# Patient Record
Sex: FEMALE | Race: WHITE | Employment: OTHER | URBAN - METROPOLITAN AREA
[De-identification: names, ages, dates, MRNs, and addresses within clinical notes are randomized per-mention and may not be internally consistent; named-entity substitution may affect disease eponyms.]

---

## 2017-11-01 ENCOUNTER — HOSPITAL ENCOUNTER (OUTPATIENT)
Age: 72
Discharge: HOME | End: 2017-11-01
Payer: MEDICARE

## 2017-11-01 ENCOUNTER — HOSPITAL ENCOUNTER (OUTPATIENT)
Dept: GENERAL RADIOLOGY | Age: 72
Discharge: HOME | End: 2017-11-01
Payer: MEDICARE
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
## 2017-11-01 DIAGNOSIS — R52 PAIN: ICD-10-CM

## 2017-11-01 PROCEDURE — 71020 XR CHEST STANDARD TWO VW: CPT

## 2018-01-15 ENCOUNTER — OFFICE VISIT (OUTPATIENT)
Dept: BEHAVIORAL/MENTAL HEALTH CLINIC | Age: 73
End: 2018-01-15
Payer: COMMERCIAL

## 2018-01-15 VITALS
HEIGHT: 61 IN | DIASTOLIC BLOOD PRESSURE: 86 MMHG | WEIGHT: 139 LBS | BODY MASS INDEX: 26.24 KG/M2 | HEART RATE: 102 BPM | SYSTOLIC BLOOD PRESSURE: 140 MMHG | RESPIRATION RATE: 16 BRPM

## 2018-01-15 DIAGNOSIS — F10.10 ALCOHOL USE DISORDER, MILD, ABUSE: ICD-10-CM

## 2018-01-15 DIAGNOSIS — F34.1 PERSISTENT DEPRESSIVE DISORDER WITH ANXIOUS DISTRESS, CURRENTLY SEVERE: Primary | ICD-10-CM

## 2018-01-15 DIAGNOSIS — R41.9 COGNITIVE COMPLAINTS: ICD-10-CM

## 2018-01-15 PROCEDURE — 1036F TOBACCO NON-USER: CPT | Performed by: PSYCHIATRY & NEUROLOGY

## 2018-01-15 PROCEDURE — 90792 PSYCH DIAG EVAL W/MED SRVCS: CPT | Performed by: PSYCHIATRY & NEUROLOGY

## 2018-01-15 RX ORDER — ALPRAZOLAM 0.5 MG/1
0.5 TABLET ORAL PRN
COMMUNITY
Start: 2013-01-21

## 2018-01-15 RX ORDER — AMLODIPINE BESYLATE 5 MG/1
5 TABLET ORAL DAILY
COMMUNITY
Start: 2015-06-01 | End: 2019-04-29 | Stop reason: SDUPTHER

## 2018-01-15 ASSESSMENT — ENCOUNTER SYMPTOMS
GASTROINTESTINAL NEGATIVE: 1
EYES NEGATIVE: 1
BACK PAIN: 1
RESPIRATORY NEGATIVE: 1
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
## 2018-05-07 ENCOUNTER — HOSPITAL (OUTPATIENT)
Dept: OTHER | Age: 73
End: 2018-05-07
Attending: EMERGENCY MEDICINE

## 2018-07-12 ENCOUNTER — HOSPITAL ENCOUNTER (OUTPATIENT)
Dept: WOMENS IMAGING | Age: 73
Discharge: HOME | End: 2018-07-14
Payer: MEDICARE

## 2018-07-12 ENCOUNTER — HOSPITAL ENCOUNTER (OUTPATIENT)
Dept: WOMENS IMAGING | Age: 73
End: 2018-07-12
Payer: MEDICARE

## 2018-07-12 DIAGNOSIS — M81.0 OSTEOPOROSIS, POST-MENOPAUSAL: ICD-10-CM

## 2018-07-12 DIAGNOSIS — Z12.39 SCREENING BREAST EXAMINATION: ICD-10-CM

## 2018-07-12 PROCEDURE — 77080 DXA BONE DENSITY AXIAL: CPT

## 2018-07-12 PROCEDURE — 77063 BREAST TOMOSYNTHESIS BI: CPT

## 2018-08-03 ENCOUNTER — APPOINTMENT (OUTPATIENT)
Dept: CT IMAGING | Age: 73
End: 2018-08-03
Payer: MEDICARE

## 2018-08-03 ENCOUNTER — HOSPITAL ENCOUNTER (EMERGENCY)
Age: 73
Discharge: HOME | End: 2018-08-03
Attending: EMERGENCY MEDICINE
Payer: MEDICARE

## 2018-08-03 VITALS
BODY MASS INDEX: 26.11 KG/M2 | WEIGHT: 133 LBS | DIASTOLIC BLOOD PRESSURE: 77 MMHG | HEART RATE: 87 BPM | HEIGHT: 60 IN | SYSTOLIC BLOOD PRESSURE: 161 MMHG | TEMPERATURE: 98.8 F | RESPIRATION RATE: 18 BRPM | OXYGEN SATURATION: 97 %

## 2018-08-03 DIAGNOSIS — N13.2 HYDRONEPHROSIS WITH URINARY OBSTRUCTION DUE TO URETERAL CALCULUS: Primary | ICD-10-CM

## 2018-08-03 DIAGNOSIS — N30.01 ACUTE CYSTITIS WITH HEMATURIA: ICD-10-CM

## 2018-08-03 LAB
-: ABNORMAL
ABSOLUTE EOS #: 0.1 K/UL (ref 0–0.4)
ABSOLUTE IMMATURE GRANULOCYTE: ABNORMAL K/UL (ref 0–0.3)
ABSOLUTE LYMPH #: 1.5 K/UL (ref 1–4.8)
ABSOLUTE MONO #: 0.9 K/UL (ref 0.1–1.2)
ALBUMIN SERPL-MCNC: 4.2 G/DL (ref 3.5–5.2)
ALBUMIN/GLOBULIN RATIO: 1.4 (ref 1–2.5)
ALP BLD-CCNC: 124 U/L (ref 35–104)
ALT SERPL-CCNC: 14 U/L (ref 5–33)
AMORPHOUS: ABNORMAL
ANION GAP SERPL CALCULATED.3IONS-SCNC: 13 MMOL/L (ref 9–17)
AST SERPL-CCNC: 15 U/L
BACTERIA: ABNORMAL
BASOPHILS # BLD: 1 % (ref 0–2)
BASOPHILS ABSOLUTE: 0 K/UL (ref 0–0.2)
BILIRUB SERPL-MCNC: 0.31 MG/DL (ref 0.3–1.2)
BILIRUBIN DIRECT: 0.09 MG/DL
BILIRUBIN URINE: NEGATIVE
BILIRUBIN, INDIRECT: 0.22 MG/DL (ref 0–1)
BUN BLDV-MCNC: 20 MG/DL (ref 8–23)
BUN/CREAT BLD: ABNORMAL (ref 9–20)
CALCIUM SERPL-MCNC: 10.2 MG/DL (ref 8.6–10.4)
CASTS UA: ABNORMAL /LPF
CHLORIDE BLD-SCNC: 99 MMOL/L (ref 98–107)
CO2: 25 MMOL/L (ref 20–31)
COLOR: YELLOW
COMMENT UA: ABNORMAL
CREAT SERPL-MCNC: 1.49 MG/DL (ref 0.5–0.9)
CRYSTALS, UA: ABNORMAL /HPF
DIFFERENTIAL TYPE: ABNORMAL
EOSINOPHILS RELATIVE PERCENT: 2 % (ref 1–4)
EPITHELIAL CELLS UA: ABNORMAL /HPF (ref 0–5)
GFR AFRICAN AMERICAN: 42 ML/MIN
GFR NON-AFRICAN AMERICAN: 34 ML/MIN
GFR SERPL CREATININE-BSD FRML MDRD: ABNORMAL ML/MIN/{1.73_M2}
GFR SERPL CREATININE-BSD FRML MDRD: ABNORMAL ML/MIN/{1.73_M2}
GLOBULIN: ABNORMAL G/DL (ref 1.5–3.8)
GLUCOSE BLD-MCNC: 108 MG/DL (ref 70–99)
GLUCOSE URINE: NEGATIVE
HCT VFR BLD CALC: 39.8 % (ref 36–46)
HEMOGLOBIN: 13.4 G/DL (ref 12–16)
IMMATURE GRANULOCYTES: ABNORMAL %
KETONES, URINE: ABNORMAL
LACTIC ACID: 1.1 MMOL/L (ref 0.5–2.2)
LEUKOCYTE ESTERASE, URINE: ABNORMAL
LIPASE: 36 U/L (ref 13–60)
LYMPHOCYTES # BLD: 22 % (ref 24–44)
MCH RBC QN AUTO: 31 PG (ref 26–34)
MCHC RBC AUTO-ENTMCNC: 33.7 G/DL (ref 31–37)
MCV RBC AUTO: 91.9 FL (ref 80–100)
MONOCYTES # BLD: 13 % (ref 2–11)
MUCUS: ABNORMAL
NITRITE, URINE: NEGATIVE
NRBC AUTOMATED: ABNORMAL PER 100 WBC
OTHER OBSERVATIONS UA: ABNORMAL
PDW BLD-RTO: 15.2 % (ref 12.5–15.4)
PH UA: 5.5 (ref 5–8)
PLATELET # BLD: 241 K/UL (ref 140–450)
PLATELET ESTIMATE: ABNORMAL
PMV BLD AUTO: 8 FL (ref 6–12)
POTASSIUM SERPL-SCNC: 3.6 MMOL/L (ref 3.7–5.3)
PROTEIN UA: NEGATIVE
RBC # BLD: 4.33 M/UL (ref 4–5.2)
RBC # BLD: ABNORMAL 10*6/UL
RBC UA: ABNORMAL /HPF (ref 0–2)
RENAL EPITHELIAL, UA: ABNORMAL /HPF
SEG NEUTROPHILS: 62 % (ref 36–66)
SEGMENTED NEUTROPHILS ABSOLUTE COUNT: 4.1 K/UL (ref 1.8–7.7)
SODIUM BLD-SCNC: 137 MMOL/L (ref 135–144)
SPECIFIC GRAVITY UA: 1.02 (ref 1–1.03)
TOTAL PROTEIN: 7.1 G/DL (ref 6.4–8.3)
TRICHOMONAS: ABNORMAL
TURBIDITY: ABNORMAL
URINE HGB: ABNORMAL
UROBILINOGEN, URINE: NORMAL
WBC # BLD: 6.6 K/UL (ref 3.5–11)
WBC # BLD: ABNORMAL 10*3/UL
WBC UA: ABNORMAL /HPF (ref 0–5)
YEAST: ABNORMAL

## 2018-08-03 PROCEDURE — 2580000003 HC RX 258: Performed by: EMERGENCY MEDICINE

## 2018-08-03 PROCEDURE — 36415 COLL VENOUS BLD VENIPUNCTURE: CPT

## 2018-08-03 PROCEDURE — 80048 BASIC METABOLIC PNL TOTAL CA: CPT

## 2018-08-03 PROCEDURE — 99284 EMERGENCY DEPT VISIT MOD MDM: CPT

## 2018-08-03 PROCEDURE — 85025 COMPLETE CBC W/AUTO DIFF WBC: CPT

## 2018-08-03 PROCEDURE — 83605 ASSAY OF LACTIC ACID: CPT

## 2018-08-03 PROCEDURE — 87086 URINE CULTURE/COLONY COUNT: CPT

## 2018-08-03 PROCEDURE — 83690 ASSAY OF LIPASE: CPT

## 2018-08-03 PROCEDURE — 6360000002 HC RX W HCPCS: Performed by: EMERGENCY MEDICINE

## 2018-08-03 PROCEDURE — 81001 URINALYSIS AUTO W/SCOPE: CPT

## 2018-08-03 PROCEDURE — 74176 CT ABD & PELVIS W/O CONTRAST: CPT

## 2018-08-03 PROCEDURE — 80076 HEPATIC FUNCTION PANEL: CPT

## 2018-08-03 RX ORDER — TAMSULOSIN HYDROCHLORIDE 0.4 MG/1
0.4 CAPSULE ORAL DAILY
Qty: 30 CAPSULE | Refills: 0 | Status: SHIPPED | OUTPATIENT
Start: 2018-08-03 | End: 2018-11-27

## 2018-08-03 RX ORDER — ONDANSETRON 4 MG/1
4 TABLET, FILM COATED ORAL ONCE
Status: COMPLETED | OUTPATIENT
Start: 2018-08-03 | End: 2018-08-03

## 2018-08-03 RX ORDER — 0.9 % SODIUM CHLORIDE 0.9 %
80 INTRAVENOUS SOLUTION INTRAVENOUS ONCE
Status: COMPLETED | OUTPATIENT
Start: 2018-08-03 | End: 2018-08-03

## 2018-08-03 RX ORDER — ONDANSETRON 4 MG/1
4 TABLET, ORALLY DISINTEGRATING ORAL EVERY 8 HOURS PRN
Qty: 12 TABLET | Refills: 0 | Status: SHIPPED | OUTPATIENT
Start: 2018-08-03 | End: 2019-08-20 | Stop reason: SDUPTHER

## 2018-08-03 RX ORDER — CEPHALEXIN 500 MG/1
500 CAPSULE ORAL 3 TIMES DAILY
Qty: 30 CAPSULE | Refills: 0 | Status: SHIPPED | OUTPATIENT
Start: 2018-08-03 | End: 2018-08-13

## 2018-08-03 RX ORDER — 0.9 % SODIUM CHLORIDE 0.9 %
80 INTRAVENOUS SOLUTION INTRAVENOUS ONCE
Status: DISCONTINUED | OUTPATIENT
Start: 2018-08-03 | End: 2018-08-04 | Stop reason: HOSPADM

## 2018-08-03 RX ORDER — SODIUM CHLORIDE 0.9 % (FLUSH) 0.9 %
10 SYRINGE (ML) INJECTION PRN
Status: DISCONTINUED | OUTPATIENT
Start: 2018-08-03 | End: 2018-08-04 | Stop reason: HOSPADM

## 2018-08-03 RX ORDER — HYDROCODONE BITARTRATE AND ACETAMINOPHEN 5; 325 MG/1; MG/1
1 TABLET ORAL EVERY 6 HOURS PRN
Qty: 12 TABLET | Refills: 0 | Status: SHIPPED | OUTPATIENT
Start: 2018-08-03 | End: 2018-08-06

## 2018-08-03 RX ADMIN — SODIUM CHLORIDE 80 ML: 9 INJECTION, SOLUTION INTRAVENOUS at 21:11

## 2018-08-03 RX ADMIN — ONDANSETRON HYDROCHLORIDE 4 MG: 4 TABLET, FILM COATED ORAL at 21:06

## 2018-08-03 ASSESSMENT — PAIN SCALES - GENERAL: PAINLEVEL_OUTOF10: 5

## 2018-08-03 ASSESSMENT — ENCOUNTER SYMPTOMS
ABDOMINAL PAIN: 1
EYE REDNESS: 0
VOMITING: 0
BACK PAIN: 0
COUGH: 0
NAUSEA: 1
SHORTNESS OF BREATH: 0
EYE DISCHARGE: 0
SORE THROAT: 0
DIARRHEA: 0

## 2018-08-03 ASSESSMENT — PAIN DESCRIPTION - LOCATION: LOCATION: ABDOMEN

## 2018-08-04 NOTE — ED PROVIDER NOTES
[facility]  [address] [facility] [address]  Phone: [phone]  Fax: [phone]      Pt Name: Mamadou Owens  MRN: [id number]  Silvano 1945  Date of evaluation: 8/3/2018      CHIEF COMPLAINT       Chief Complaint   Patient presents with    Abdominal Pain     onset a couple days ago and went to dr and was put on bactrim for uti. pain continues    Nausea       HISTORY OF PRESENT ILLNESS   (Location, Quality, Severity, Duration, Timing, Context, Modifying Factors, Associated Signs and Symptoms)     Mamadou Owens is a 68 y.o. female who presents to the ER for evaluation of abdominal pain. Patient states over the past 2-3 days she has had left lower quadrant abdominal pain. She states that at times the pain is sharp and spasm-like. A few times and felt as if the pain radiated to her back. Patient was seen and evaluated by her primary care physician and diagnosed with urinary tract infection. Patient was placed on Bactrim DS. She has been taking the medication over the past 2 days with no relief of her symptoms. She states that she continues to have pain. She states her pain was severe last night preventing her from sleeping. She has had associated nausea, but no vomiting. She denies diarrhea. No blood or mucus in the stool. She has had no fevers or chills. She does report urinary frequency. Patient does not recall a history of diverticulitis. She has not been taking any medication for discomfort. Her primary care physician did phone in a new antibiotic (Macrobid), but the patient did not  the medicine. She did solicit the advise of her pharmacist concerning her symptoms and it was recommended that she come to the ER for evaluation. Patient rates her acute pain at 5/10. Pain is worse with lying down. No change in pain associated with eating or drinking. Nursing Notes were reviewed.     REVIEW OF SYSTEMS     (2-9 systems for level 4, 10 or more for level 5)    Review of Systems   Constitutional: Negative for chills and fever. HENT: Negative for congestion, ear pain and sore throat. Eyes: Negative for discharge and redness. Respiratory: Negative for cough and shortness of breath. Cardiovascular: Negative for chest pain. Gastrointestinal: Positive for abdominal pain and nausea. Negative for diarrhea and vomiting. Genitourinary: Positive for dysuria and frequency. Musculoskeletal: Negative for back pain and neck pain. Skin: Negative for rash. Neurological: Negative for seizures and syncope. All other systems reviewed and are negative. PAST MEDICAL HISTORY    has a past medical history of Anxiety; Depression; Hyperlipidemia; Hypertension; and MS (multiple sclerosis) ([facility] [address] SURGICAL HISTORY      has a past surgical history that includes eye surgery; knee surgery; shoulder surgery; and hernia repair (05/2017). CURRENT MEDICATIONS       Discharge Medication List as of 8/3/2018 10:50 PM      CONTINUE these medications which have NOT CHANGED    Details   SIMVASTATIN PO Take by mouthHistorical Med      vitamin D (CHOLECALCIFEROL) 1000 UNIT TABS tablet Take 1,000 Units by mouth dailyHistorical Med      Multiple Vitamins-Minerals (OCUVITE ADULT FORMULA PO) Take by mouthHistorical Med      ALPRAZolam (XANAX) 0.5 MG tablet Take 0.5 mg by mouth 3 times daily prn. Historical Med      amLODIPine (NORVASC) 5 MG tablet Take 5 mg by mouth dailyHistorical Med      Multiple Vitamin (MULTI-DAY PO) Take by mouth dailyHistorical Med      CALCIUM PO one tab dailyHistorical Med      sertraline (ZOLOFT) 50 MG tablet Take 50 mg by mouth dailyHistorical Med           ALLERGIES     is allergic to ciprofloxacin; codeine; and lisinopril. FAMILY HISTORY     is adopted. family history is not on file. She was adopted. SOCIAL HISTORY      reports that she has never smoked. She has never used smokeless tobacco. She reports that she drinks alcohol. She reports that she does not use drugs. PHYSICAL EXAM     (7 for level 4, 8 or more for level 5)    ED Triage Vitals [08/03/18 2026]   BP Temp Temp Source Pulse Resp SpO2 Height Weight   (!) 161/77 98.8 °F (37.1 °C) Oral 87 -- 97 % 5' (1.524 m) 133 lb (60.3 kg)     Physical Exam   Constitutional: She appears well-developed and well-nourished. No distress. Nontoxic. HENT:   Head: Normocephalic and atraumatic. Eyes: No scleral icterus. Cardiovascular: Normal rate, regular rhythm and normal heart sounds. Pulmonary/Chest: Effort normal and breath sounds normal. No respiratory distress. Abdominal: Soft. Bowel sounds are normal. There is tenderness in the left lower quadrant. There is no rebound, no guarding and no CVA tenderness. Musculoskeletal:   Normal ambulation. Neurological: She is alert. Grossly intact. Skin: Skin is warm and dry. No rash noted. Psychiatric: She has a normal mood and affect. Her behavior is normal.   Vitals reviewed. DIAGNOSTIC RESULTS     RADIOLOGY:   Radiology images were visualized by myself. The Radiologist interpretations were reviewed and are as follows:     CT ABDOMEN PELVIS WO CONTRAST Additional Contrast? None (Final result)   Result time 08/03/18 22:33:48   Procedure changed from CT ABDOMEN PELVIS W IV CONTRAST Additional Contrast? None   Final result by Cecelia Sanchez MD (08/03/18 22:33:48)                Impression:    1. There is a 3 mm obstructing calculus at the left uterovesical junction  causing some thickening felt to be edema of the bladder base around the UVJ  as well as mild left hydronephrosis and hydroureter. 2. Bilateral nonobstructing sub 5 mm calculi left greater than right.   3. A 4.8 cm right renal cyst.            Narrative:    EXAMINATION:  CT OF THE ABDOMEN AND PELVIS WITHOUT CONTRAST 8/3/2018 10:19 pm    TECHNIQUE:  CT of the abdomen and pelvis was performed without the administration of  intravenous contrast. Multiplanar reformatted images are provided for review. Dose modulation, iterative reconstruction, and/or weight based adjustment of  the mA/kV was utilized to reduce the radiation dose to as low as reasonably  achievable. COMPARISON:  None. HISTORY:  ORDERING SYSTEM PROVIDED HISTORY: LLQ pain x 3 days  TECHNOLOGIST PROVIDED HISTORY:  Additional Contrast?->None  Ordering Physician Provided Reason for Exam: lower abdominal pain with  nausesa x 4 days. Acuity: Acute  Type of Exam: Subsequent/Follow-up  Additional signs and symptoms: States recent dx of UTI but reports bactrim is  not helping and now complains of bowel pain and gas. Relevant Medical/Surgical History: Hx of hiatal hernia, denies other  abdominal surgeries. FINDINGS:  Lower Chest: The visualized heart and lungs show no acute abnormalities. Organs: The liver, spleen, pancreas and adrenal glands show no significant  abnormality.  Gallbladder is also grossly normal showing no evidence for  gallstones. There is 4.8 cm simple cyst lower pole right kidney.  Punctate calculi right  kidney.  No right ureteric calculus. A few nonobstructing intrarenal left renal calculi are noted the largest 3 mm  in size.  There is mild hydronephrosis and hydroureter. Jeneal Chandu is a 3 mm  calculus at the left uterovesical junction. GI/Bowel: There is limited evaluation due to absence of oral contrast.    The stomach shows no focal lesions.  Small bowel loops normal in caliber  showing no focal abnormalities. Normal appendix. Evaluation of the colon shows no acute process. Pelvis: Uterus grossly normal.  Some thickening of the bladder base around  the left uterovesical junction where there is an obstructing calculus felt to  be due to edema from the obstruction.  .    Peritoneum/Retroperitoneum: No free intraperitoneal fluid or significant  lymphadenopathy.  Minor atherosclerotic disease.     Bones/Soft Tissues: No acute abnormality of the bones.  The superficial Granulocyte NOT REPORTED 0.00 - 0.30 k/uL    WBC Morphology NOT REPORTED     RBC Morphology NOT REPORTED     Platelet Estimate NOT REPORTED    Lipase   Result Value Ref Range    Lipase 36 13 - 60 U/L   Hepatic Function Panel   Result Value Ref Range    Alb 4.2 3.5 - 5.2 g/dL    Alkaline Phosphatase 124 (H) 35 - 104 U/L    ALT 14 5 - 33 U/L    AST 15 <32 U/L    Total Bilirubin 0.31 0.3 - 1.2 mg/dL    Bilirubin, Direct 0.09 <0.31 mg/dL    Bilirubin, Indirect 0.22 0.00 - 1.00 mg/dL    Total Protein 7.1 6.4 - 8.3 g/dL    Globulin NOT REPORTED 1.5 - 3.8 g/dL    Albumin/Globulin Ratio 1.4 1.0 - 2.5   Lactic Acid   Result Value Ref Range    Lactic Acid 1.1 0.5 - 2.2 mmol/L   Microscopic Urinalysis   Result Value Ref Range    -          WBC, UA 2 TO 5 0 - 5 /HPF    RBC, UA 2 TO 5 0 - 2 /HPF    Casts UA NOT REPORTED /LPF    Crystals UA FEW (A) NONE /HPF    Epithelial Cells UA 2 TO 5 0 - 5 /HPF    Renal Epithelial, Urine NOT REPORTED 0 /HPF    Bacteria, UA NOT REPORTED NONE    Mucus, UA NOT REPORTED NONE    Trichomonas, UA NOT REPORTED NONE    Amorphous, UA NOT REPORTED NONE    Other Observations UA Culture ordered based on defined criteria. (A) NREQ    Yeast, UA NOT REPORTED NONE     MDM:   Patient presents to the ER for evaluation of left lower quadrant abdominal pain. Patient states that she has had pain over the past 2-3 days. She states the pain was severe last night preventing her from sleeping. She states that it varies from a sharp to spasm-like pain. Patient was evaluated by the primary care physician and diagnosed with urinary tract infection. She was placed on Bactrim DS and has been taking the medication with no relief. A new prescription for Macrobid was called to the pharmacy, but the patient states that she did not pick it up. After speaking with her pharmacist she felt it would be best that she come to the ER for repeat evaluation. On exam, the patient appears well-hydrated.   She is not tachycardic or

## 2018-08-04 NOTE — ED PROVIDER NOTES
[facility]  [address] [address]  Phone: [phone]  Fax: [phone]      Attending Physician Attestation    I performed a history and physical examination of the patient and discussed management with the mid level provider. I reviewed the mid level provider's note and agree with the documented findings and plan of care. Any areas of disagreement are noted on the chart. I was personally present for the key portions of any procedures. I have documented in the chart those procedures where I was not present during the key portions. I have reviewed the emergency nurses triage note. I agree with the chief complaint, past medical history, past surgical history, allergies, medications, social and family history as documented unless otherwise noted below. Documentation of the HPI, Physical Exam and Medical Decision Making performed by mid level providers is based on my personal performance of the HPI, PE and MDM. For Physician Assistant/ Nurse Practitioner cases/documentation I have personally evaluated this patient and have completed at least one if not all key elements of the E/M (history, physical exam, and MDM). Additional findings are as noted. CHIEF COMPLAINT       Chief Complaint   Patient presents with    Abdominal Pain     onset a couple days ago and went to dr and was put on bactrim for uti. pain continues    Nausea         HISTORY OF PRESENT ILLNESS    Noe Cortés is a 68 y.o. female who presents Complaining of lower abdominal pain. She was recently diagnosed with urinary tract infection and despite being on antibiotics for 2 days, she still has severe pain. Pain on skills are tends 5 does not radiate anywhere. No nausea vomiting. No fevers or chills. She's never had pain or problems like this in the past.       PAST MEDICAL HISTORY    has a past medical history of Anxiety; Depression; Hyperlipidemia;  Hypertension; and MS (multiple sclerosis) Hematocrit 39.8 36 - 46 %    MCV 91.9 80 - 100 fL    MCH 31.0 26 - 34 pg    MCHC 33.7 31 - 37 g/dL    RDW 15.2 12.5 - 15.4 %    Platelets 981 425 - 936 k/uL    MPV 8.0 6.0 - 12.0 fL    NRBC Automated NOT REPORTED per 100 WBC    Differential Type NOT REPORTED     Seg Neutrophils 62 36 - 66 %    Lymphocytes 22 (L) 24 - 44 %    Monocytes 13 (H) 2 - 11 %    Eosinophils % 2 1 - 4 %    Basophils 1 0 - 2 %    Immature Granulocytes NOT REPORTED 0 %    Segs Absolute 4.10 1.8 - 7.7 k/uL    Absolute Lymph # 1.50 1.0 - 4.8 k/uL    Absolute Mono # 0.90 0.1 - 1.2 k/uL    Absolute Eos # 0.10 0.0 - 0.4 k/uL    Basophils # 0.00 0.0 - 0.2 k/uL    Absolute Immature Granulocyte NOT REPORTED 0.00 - 0.30 k/uL    WBC Morphology NOT REPORTED     RBC Morphology NOT REPORTED     Platelet Estimate NOT REPORTED    Lipase   Result Value Ref Range    Lipase 36 13 - 60 U/L   Hepatic Function Panel   Result Value Ref Range    Alb 4.2 3.5 - 5.2 g/dL    Alkaline Phosphatase 124 (H) 35 - 104 U/L    ALT 14 5 - 33 U/L    AST 15 <32 U/L    Total Bilirubin 0.31 0.3 - 1.2 mg/dL    Bilirubin, Direct 0.09 <0.31 mg/dL    Bilirubin, Indirect 0.22 0.00 - 1.00 mg/dL    Total Protein 7.1 6.4 - 8.3 g/dL    Globulin NOT REPORTED 1.5 - 3.8 g/dL    Albumin/Globulin Ratio 1.4 1.0 - 2.5   Lactic Acid   Result Value Ref Range    Lactic Acid 1.1 0.5 - 2.2 mmol/L   Microscopic Urinalysis   Result Value Ref Range    -          WBC, UA 2 TO 5 0 - 5 /HPF    RBC, UA 2 TO 5 0 - 2 /HPF    Casts UA NOT REPORTED /LPF    Crystals UA FEW (A) NONE /HPF    Epithelial Cells UA 2 TO 5 0 - 5 /HPF    Renal Epithelial, Urine NOT REPORTED 0 /HPF    Bacteria, UA NOT REPORTED NONE    Mucus, UA NOT REPORTED NONE    Trichomonas, UA NOT REPORTED NONE    Amorphous, UA NOT REPORTED NONE    Other Observations UA Culture ordered based on defined criteria.  (A) NREQ    Yeast, UA NOT REPORTED NONE           EMERGENCY DEPARTMENT COURSE:   Vitals:    Vitals:    08/03/18 2026   BP: (!) 161/77   Pulse: 87   Temp: 98.8 °F (37.1 °C)   TempSrc: Oral   SpO2: 97%   Weight: 60.3 kg (133 lb)   Height: 5' (1.524 m)     -------------------------  BP: (!) 161/77, Temp: 98.8 °F (37.1 °C), Pulse: 87,        PERTINENT ATTENDING PHYSICIAN COMMENTS:    Workup demonstrates ureterolithiasis with, urinary tract infection. She reports that she's not tolerating her current antibiotic therefore we'll change her to Keflex. I will write for [employer] as well as [employer] because [address] does make her nauseated. She has been given a narcotic warning. I will refer her on to urology. Impression:  1. Hydronephrosis with urinary obstruction due to ureteral calculus    2. Acute cystitis with hematuria       Condition: Good      (Please note that portions of this note were completed with a voice recognition program.  Efforts were made to edit the dictations but occasionally words are mis-transcribed.)    Goff MD, F.A.C.E.P.   Attending Emergency Medicine Physician        Renee Mcgowan MD  08/03/18 6085

## 2018-08-05 LAB
CULTURE: NORMAL
Lab: NORMAL
SPECIMEN DESCRIPTION: NORMAL
STATUS: NORMAL

## 2018-08-17 ENCOUNTER — HOSPITAL ENCOUNTER (OUTPATIENT)
Age: 73
Discharge: HOME | End: 2018-08-17
Payer: MEDICARE

## 2018-08-17 ENCOUNTER — HOSPITAL ENCOUNTER (OUTPATIENT)
Age: 73
Discharge: HOME | End: 2018-08-19
Payer: MEDICARE

## 2018-08-17 ENCOUNTER — HOSPITAL ENCOUNTER (OUTPATIENT)
Dept: GENERAL RADIOLOGY | Age: 73
Discharge: HOME | End: 2018-08-19
Payer: MEDICARE

## 2018-08-17 DIAGNOSIS — N20.2 CALCULUS OF KIDNEY AND URETER: ICD-10-CM

## 2018-08-17 LAB
-: ABNORMAL
AMORPHOUS: ABNORMAL
ANION GAP SERPL CALCULATED.3IONS-SCNC: 12 MMOL/L (ref 9–17)
BACTERIA: ABNORMAL
BILIRUBIN URINE: NEGATIVE
BUN BLDV-MCNC: 17 MG/DL (ref 8–23)
BUN/CREAT BLD: ABNORMAL (ref 9–20)
CALCIUM SERPL-MCNC: 9.5 MG/DL (ref 8.6–10.4)
CASTS UA: ABNORMAL /LPF (ref 0–2)
CHLORIDE BLD-SCNC: 103 MMOL/L (ref 98–107)
CO2: 25 MMOL/L (ref 20–31)
COLOR: YELLOW
COMMENT UA: ABNORMAL
CREAT SERPL-MCNC: 0.75 MG/DL (ref 0.5–0.9)
CRYSTALS, UA: ABNORMAL /HPF
EPITHELIAL CELLS UA: ABNORMAL /HPF (ref 0–5)
GFR AFRICAN AMERICAN: >60 ML/MIN
GFR NON-AFRICAN AMERICAN: >60 ML/MIN
GFR SERPL CREATININE-BSD FRML MDRD: ABNORMAL ML/MIN/{1.73_M2}
GFR SERPL CREATININE-BSD FRML MDRD: ABNORMAL ML/MIN/{1.73_M2}
GLUCOSE BLD-MCNC: 101 MG/DL (ref 70–99)
GLUCOSE URINE: NEGATIVE
KETONES, URINE: ABNORMAL
LEUKOCYTE ESTERASE, URINE: ABNORMAL
MUCUS: ABNORMAL
NITRITE, URINE: NEGATIVE
OTHER OBSERVATIONS UA: ABNORMAL
PH UA: 6 (ref 5–8)
POTASSIUM SERPL-SCNC: 3.5 MMOL/L (ref 3.7–5.3)
PROTEIN UA: NEGATIVE
RBC UA: ABNORMAL /HPF (ref 0–2)
RENAL EPITHELIAL, UA: ABNORMAL /HPF
SODIUM BLD-SCNC: 140 MMOL/L (ref 135–144)
SPECIFIC GRAVITY UA: 1.02 (ref 1–1.03)
TRICHOMONAS: ABNORMAL
TURBIDITY: ABNORMAL
URINE HGB: NEGATIVE
UROBILINOGEN, URINE: NORMAL
WBC UA: ABNORMAL /HPF (ref 0–5)
YEAST: ABNORMAL

## 2018-08-17 PROCEDURE — 74018 RADEX ABDOMEN 1 VIEW: CPT

## 2018-08-17 PROCEDURE — 81001 URINALYSIS AUTO W/SCOPE: CPT

## 2018-08-17 PROCEDURE — 87086 URINE CULTURE/COLONY COUNT: CPT

## 2018-08-17 PROCEDURE — 36415 COLL VENOUS BLD VENIPUNCTURE: CPT

## 2018-08-17 PROCEDURE — 82365 CALCULUS SPECTROSCOPY: CPT

## 2018-08-17 PROCEDURE — 80048 BASIC METABOLIC PNL TOTAL CA: CPT

## 2018-08-17 PROCEDURE — 86403 PARTICLE AGGLUT ANTBDY SCRN: CPT

## 2018-08-18 LAB
CULTURE: ABNORMAL
Lab: ABNORMAL
SPECIMEN DESCRIPTION: ABNORMAL
STATUS: ABNORMAL

## 2018-08-22 LAB
STONE COMPOSITION: NORMAL
STONE COMPOSITION: NORMAL
STONE DESCRIPTION: NORMAL
STONE DESCRIPTION: NORMAL
STONE MASS: 5 MG
STONE MASS: NORMAL
STONE NUMBER: 1
STONE NUMBER: 1
STONE SIZE: NORMAL
STONE SIZE: NORMAL MM

## 2018-08-23 ENCOUNTER — HOSPITAL ENCOUNTER (OUTPATIENT)
Dept: ULTRASOUND IMAGING | Age: 73
Discharge: HOME | End: 2018-08-25
Payer: MEDICARE

## 2018-08-23 DIAGNOSIS — N20.2 CALCULUS OF KIDNEY WITH CALCULUS OF URETER: ICD-10-CM

## 2018-08-23 PROCEDURE — 76770 US EXAM ABDO BACK WALL COMP: CPT

## 2018-09-20 ENCOUNTER — HOSPITAL ENCOUNTER (OUTPATIENT)
Dept: GENERAL RADIOLOGY | Age: 73
Discharge: HOME | End: 2018-09-22
Payer: MEDICARE

## 2018-09-20 ENCOUNTER — HOSPITAL ENCOUNTER (OUTPATIENT)
Age: 73
Discharge: HOME | End: 2018-09-22
Payer: MEDICARE

## 2018-09-20 DIAGNOSIS — M19.90 ARTHRITIS: ICD-10-CM

## 2018-09-20 PROCEDURE — 73110 X-RAY EXAM OF WRIST: CPT

## 2018-11-27 ENCOUNTER — HOSPITAL ENCOUNTER (EMERGENCY)
Age: 73
Discharge: HOME | End: 2018-11-27
Attending: EMERGENCY MEDICINE
Payer: MEDICARE

## 2018-11-27 ENCOUNTER — APPOINTMENT (OUTPATIENT)
Dept: GENERAL RADIOLOGY | Age: 73
End: 2018-11-27
Payer: MEDICARE

## 2018-11-27 VITALS
SYSTOLIC BLOOD PRESSURE: 150 MMHG | WEIGHT: 135 LBS | TEMPERATURE: 98.1 F | DIASTOLIC BLOOD PRESSURE: 75 MMHG | RESPIRATION RATE: 14 BRPM | BODY MASS INDEX: 27.21 KG/M2 | HEART RATE: 76 BPM | OXYGEN SATURATION: 97 % | HEIGHT: 59 IN

## 2018-11-27 DIAGNOSIS — S46.912A ELBOW STRAIN, LEFT, INITIAL ENCOUNTER: Primary | ICD-10-CM

## 2018-11-27 PROCEDURE — 99283 EMERGENCY DEPT VISIT LOW MDM: CPT

## 2018-11-27 PROCEDURE — 73080 X-RAY EXAM OF ELBOW: CPT

## 2018-11-27 ASSESSMENT — PAIN SCALES - GENERAL: PAINLEVEL_OUTOF10: 3

## 2018-11-27 ASSESSMENT — PAIN DESCRIPTION - ORIENTATION: ORIENTATION: LEFT

## 2018-11-27 ASSESSMENT — PAIN DESCRIPTION - LOCATION: LOCATION: ARM

## 2018-11-27 ASSESSMENT — PAIN DESCRIPTION - PAIN TYPE: TYPE: ACUTE PAIN

## 2018-11-27 NOTE — ED PROVIDER NOTES
She was adopted. SOCIAL HISTORY      reports that she has never smoked. She has never used smokeless tobacco. She reports that she drinks alcohol. She reports that she does not use drugs. PHYSICAL EXAM       ED Triage Vitals [11/27/18 1529]   BP Temp Temp Source Pulse Resp SpO2 Height Weight   (!) 150/75 98.1 °F (36.7 °C) Oral 76 14 97 % 4' 11\" (1.499 m) 135 lb (61.2 kg)     Constitutional: Alert, oriented x3, nontoxic, answering questions appropriately, acting properly for age, in no acute distress   HEENT: Extraocular muscles intact, mucous membranes moist   Neck: Trachea midline    Musculoskeletal:  Left upper extremity no pain at the shoulder or wrist.  Radial and ulnar arteries intact. Capillary refill less than 2 seconds. There is tenderness over the radial head without deformity no swelling no ecchymosis. Neurologic: Left upper extremity motor and sensory intact. Skin: Warm and dry         Physical Exam  DIFFERENTIAL DIAGNOSIS/ MDM:     Left elbow pain. Rule out Fracture    DIAGNOSTIC RESULTS     EKG: All [facility]'s [facility] who either signs or Co-signs this chart in the absence of a cardiologist.        Not indicated unless otherwise documented above    LABS:  No results found for this visit on 11/27/18. Not indicated unless otherwise documented above    RADIOLOGY:   I reviewedthe radiologist interpretations:    XR ELBOW LEFT (MIN 3 VIEWS)   Preliminary Result   No acute radiographic findings. Not indicated unless otherwise documented above    EMERGENCY DEPARTMENT COURSE:     The patient was given the following medications:  No orders of the defined types were placed in this encounter. Vitals:   -------------------------  BP (!) 150/75   Pulse 76   Temp 98.1 °F (36.7 °C) (Oral)   Resp 14   Ht 4' 11\" (1.499 m)   Wt 61.2 kg (135 lb)   SpO2 97%   BMI 27.27 kg/m²     4:05 PM x-ray unremarkable for fracture. Discharged to follow-up. Injury 6-8 weeks ago. Suspect strain. I have reviewed the disposition diagnosis with the patient and or their family/guardian. I have answered their questions and given discharge instructions. They voiced understanding of these instructions and did not have any furtherquestions or complaints.     CRITICAL CARE:    None    CONSULTS:    None    PROCEDURES:    None      OARRS Report if indicated             FINAL IMPRESSION      1. Elbow strain, left, initial encounter          DISPOSITION/PLAN   DISPOSITION Decision To Discharge 11/27/2018 04:05:57 PM        PATIENT REFERRED TO:  Ida Cooper MD  [address] [place] [address] [address] [place] [address]    Schedule an appointment as soon as possible for a visit in 1 week        DISCHARGE MEDICATIONS:  New Prescriptions    No medications on file       (Please note that portions of thisnote were completed with a voice recognition program.  Efforts were made to edit the dictations but occasionally words are mis-transcribed.)    Michel DO  Attending Emergency Physician        Becka Worley DO  11/27/18 1570

## 2019-03-12 ENCOUNTER — APPOINTMENT (OUTPATIENT)
Dept: GENERAL RADIOLOGY | Age: 74
End: 2019-03-12
Payer: MEDICARE

## 2019-03-12 ENCOUNTER — HOSPITAL ENCOUNTER (EMERGENCY)
Age: 74
Discharge: HOME | End: 2019-03-12
Attending: EMERGENCY MEDICINE
Payer: MEDICARE

## 2019-03-12 VITALS
DIASTOLIC BLOOD PRESSURE: 89 MMHG | OXYGEN SATURATION: 99 % | TEMPERATURE: 98.1 F | SYSTOLIC BLOOD PRESSURE: 155 MMHG | HEIGHT: 59 IN | WEIGHT: 148 LBS | HEART RATE: 77 BPM | BODY MASS INDEX: 29.84 KG/M2 | RESPIRATION RATE: 14 BRPM

## 2019-03-12 DIAGNOSIS — S39.012A STRAIN OF LUMBAR REGION, INITIAL ENCOUNTER: Primary | ICD-10-CM

## 2019-03-12 PROCEDURE — 72100 X-RAY EXAM L-S SPINE 2/3 VWS: CPT

## 2019-03-12 PROCEDURE — 99283 EMERGENCY DEPT VISIT LOW MDM: CPT

## 2019-03-12 ASSESSMENT — PAIN DESCRIPTION - PAIN TYPE: TYPE: ACUTE PAIN

## 2019-03-12 ASSESSMENT — PAIN SCALES - GENERAL: PAINLEVEL_OUTOF10: 4

## 2019-03-12 ASSESSMENT — PAIN DESCRIPTION - ORIENTATION: ORIENTATION: MID;LOWER

## 2019-03-12 ASSESSMENT — PAIN DESCRIPTION - LOCATION: LOCATION: BACK

## 2019-04-23 ENCOUNTER — OFFICE VISIT (OUTPATIENT)
Dept: PRIMARY CARE CLINIC | Age: 74
End: 2019-04-23
Payer: MEDICARE

## 2019-04-23 VITALS
OXYGEN SATURATION: 96 % | BODY MASS INDEX: 29.8 KG/M2 | SYSTOLIC BLOOD PRESSURE: 130 MMHG | WEIGHT: 147.8 LBS | DIASTOLIC BLOOD PRESSURE: 82 MMHG | HEART RATE: 92 BPM | HEIGHT: 59 IN

## 2019-04-23 DIAGNOSIS — R63.1 POLYDIPSIA: ICD-10-CM

## 2019-04-23 DIAGNOSIS — G62.9 NEUROPATHY: ICD-10-CM

## 2019-04-23 DIAGNOSIS — G35 MS (MULTIPLE SCLEROSIS) (HCC): ICD-10-CM

## 2019-04-23 DIAGNOSIS — E66.3 OVERWEIGHT (BMI 25.0-29.9): ICD-10-CM

## 2019-04-23 DIAGNOSIS — Z86.39 PERSONAL HISTORY OF OTHER ENDOCRINE, NUTRITIONAL AND METABOLIC DISEASE: ICD-10-CM

## 2019-04-23 DIAGNOSIS — M81.0 AGE RELATED OSTEOPOROSIS, UNSPECIFIED PATHOLOGICAL FRACTURE PRESENCE: ICD-10-CM

## 2019-04-23 DIAGNOSIS — E78.2 MIXED HYPERLIPIDEMIA: ICD-10-CM

## 2019-04-23 DIAGNOSIS — Z76.89 ENCOUNTER TO ESTABLISH CARE WITH NEW DOCTOR: Primary | ICD-10-CM

## 2019-04-23 DIAGNOSIS — I10 ESSENTIAL HYPERTENSION: ICD-10-CM

## 2019-04-23 PROCEDURE — 1090F PRES/ABSN URINE INCON ASSESS: CPT | Performed by: INTERNAL MEDICINE

## 2019-04-23 PROCEDURE — 1123F ACP DISCUSS/DSCN MKR DOCD: CPT | Performed by: INTERNAL MEDICINE

## 2019-04-23 PROCEDURE — 3017F COLORECTAL CA SCREEN DOC REV: CPT | Performed by: INTERNAL MEDICINE

## 2019-04-23 PROCEDURE — G0444 DEPRESSION SCREEN ANNUAL: HCPCS | Performed by: INTERNAL MEDICINE

## 2019-04-23 PROCEDURE — G8427 DOCREV CUR MEDS BY ELIG CLIN: HCPCS | Performed by: INTERNAL MEDICINE

## 2019-04-23 PROCEDURE — G8419 CALC BMI OUT NRM PARAM NOF/U: HCPCS | Performed by: INTERNAL MEDICINE

## 2019-04-23 PROCEDURE — G8399 PT W/DXA RESULTS DOCUMENT: HCPCS | Performed by: INTERNAL MEDICINE

## 2019-04-23 PROCEDURE — 99204 OFFICE O/P NEW MOD 45 MIN: CPT | Performed by: INTERNAL MEDICINE

## 2019-04-23 PROCEDURE — 4040F PNEUMOC VAC/ADMIN/RCVD: CPT | Performed by: INTERNAL MEDICINE

## 2019-04-23 PROCEDURE — 1036F TOBACCO NON-USER: CPT | Performed by: INTERNAL MEDICINE

## 2019-04-23 SDOH — HEALTH STABILITY: MENTAL HEALTH: HOW MANY STANDARD DRINKS CONTAINING ALCOHOL DO YOU HAVE ON A TYPICAL DAY?: 1 OR 2

## 2019-04-23 SDOH — HEALTH STABILITY: MENTAL HEALTH: HOW OFTEN DO YOU HAVE A DRINK CONTAINING ALCOHOL?: 2-3 TIMES A WEEK

## 2019-04-23 ASSESSMENT — PATIENT HEALTH QUESTIONNAIRE - PHQ9
7. TROUBLE CONCENTRATING ON THINGS, SUCH AS READING THE NEWSPAPER OR WATCHING TELEVISION: 1
5. POOR APPETITE OR OVEREATING: 0
2. FEELING DOWN, DEPRESSED OR HOPELESS: 3
SUM OF ALL RESPONSES TO PHQ QUESTIONS 1-9: 11
3. TROUBLE FALLING OR STAYING ASLEEP: 1
8. MOVING OR SPEAKING SO SLOWLY THAT OTHER PEOPLE COULD HAVE NOTICED. OR THE OPPOSITE, BEING SO FIGETY OR RESTLESS THAT YOU HAVE BEEN MOVING AROUND A LOT MORE THAN USUAL: 1
10. IF YOU CHECKED OFF ANY PROBLEMS, HOW DIFFICULT HAVE THESE PROBLEMS MADE IT FOR YOU TO DO YOUR WORK, TAKE CARE OF THINGS AT HOME, OR GET ALONG WITH OTHER PEOPLE: 1
6. FEELING BAD ABOUT YOURSELF - OR THAT YOU ARE A FAILURE OR HAVE LET YOURSELF OR YOUR FAMILY DOWN: 2
9. THOUGHTS THAT YOU WOULD BE BETTER OFF DEAD, OR OF HURTING YOURSELF: 0
4. FEELING TIRED OR HAVING LITTLE ENERGY: 2
SUM OF ALL RESPONSES TO PHQ9 QUESTIONS 1 & 2: 4
SUM OF ALL RESPONSES TO PHQ QUESTIONS 1-9: 11
1. LITTLE INTEREST OR PLEASURE IN DOING THINGS: 1

## 2019-04-24 ENCOUNTER — HOSPITAL ENCOUNTER (OUTPATIENT)
Age: 74
Discharge: HOME | End: 2019-04-24
Payer: MEDICARE

## 2019-04-24 DIAGNOSIS — Z86.39 PERSONAL HISTORY OF OTHER ENDOCRINE, NUTRITIONAL AND METABOLIC DISEASE: ICD-10-CM

## 2019-04-24 DIAGNOSIS — E78.2 MIXED HYPERLIPIDEMIA: ICD-10-CM

## 2019-04-24 DIAGNOSIS — R63.1 POLYDIPSIA: ICD-10-CM

## 2019-04-24 DIAGNOSIS — G62.9 NEUROPATHY: ICD-10-CM

## 2019-04-24 LAB
CHOLESTEROL/HDL RATIO: 2.6
CHOLESTEROL: 181 MG/DL
FOLATE: >20 NG/ML
HDLC SERPL-MCNC: 69 MG/DL
LDL CHOLESTEROL: 86 MG/DL (ref 0–130)
TRIGL SERPL-MCNC: 130 MG/DL
VITAMIN B-12: >2000 PG/ML (ref 232–1245)
VLDLC SERPL CALC-MCNC: NORMAL MG/DL (ref 1–30)

## 2019-04-24 PROCEDURE — 83036 HEMOGLOBIN GLYCOSYLATED A1C: CPT

## 2019-04-24 PROCEDURE — 82746 ASSAY OF FOLIC ACID SERUM: CPT

## 2019-04-24 PROCEDURE — 82607 VITAMIN B-12: CPT

## 2019-04-24 PROCEDURE — 80061 LIPID PANEL: CPT

## 2019-04-24 PROCEDURE — 36415 COLL VENOUS BLD VENIPUNCTURE: CPT

## 2019-04-25 ENCOUNTER — TELEPHONE (OUTPATIENT)
Dept: PRIMARY CARE CLINIC | Age: 74
End: 2019-04-25

## 2019-04-25 LAB
ESTIMATED AVERAGE GLUCOSE: 117 MG/DL
HBA1C MFR BLD: 5.7 % (ref 4–6)

## 2019-04-28 ASSESSMENT — ENCOUNTER SYMPTOMS
NAUSEA: 0
COUGH: 0
CONSTIPATION: 0
SINUS PAIN: 0
WHEEZING: 0
VOMITING: 0
DIARRHEA: 0
SHORTNESS OF BREATH: 0
ABDOMINAL PAIN: 0
BACK PAIN: 0
SINUS PRESSURE: 0
ABDOMINAL DISTENTION: 0

## 2019-04-29 ENCOUNTER — TELEPHONE (OUTPATIENT)
Dept: PRIMARY CARE CLINIC | Age: 74
End: 2019-04-29

## 2019-04-29 RX ORDER — AMLODIPINE BESYLATE 5 MG/1
5 TABLET ORAL DAILY
Qty: 30 TABLET | Refills: 5 | Status: SHIPPED | OUTPATIENT
Start: 2019-04-29

## 2019-04-29 NOTE — TELEPHONE ENCOUNTER
Medication: norvasc 5mg  Last visit: 04/23/19  Next visit: 6/24/2019  Last refill: filled by previous pcp  Pharmacy: [facility],  [facility]

## 2019-04-30 RX ORDER — NITROFURANTOIN 25; 75 MG/1; MG/1
100 CAPSULE ORAL 2 TIMES DAILY
Qty: 10 CAPSULE | Refills: 0 | Status: SHIPPED | OUTPATIENT
Start: 2019-04-30 | End: 2019-05-05

## 2019-05-14 ENCOUNTER — TELEPHONE (OUTPATIENT)
Dept: PRIMARY CARE CLINIC | Age: 74
End: 2019-05-14

## 2019-05-14 DIAGNOSIS — K21.9 GASTROESOPHAGEAL REFLUX DISEASE, ESOPHAGITIS PRESENCE NOT SPECIFIED: Primary | ICD-10-CM

## 2019-05-14 RX ORDER — PANTOPRAZOLE SODIUM 20 MG/1
20 TABLET, DELAYED RELEASE ORAL
Qty: 60 TABLET | Refills: 1 | Status: SHIPPED | OUTPATIENT
Start: 2019-05-14 | End: 2019-06-11

## 2019-05-14 NOTE — TELEPHONE ENCOUNTER
Patient wanted me to let you know that she takes a lot of calcium and prolia for her bones and is afraid to take the protonix and have it hurt her bones. Therefore she has decided to take tums and to change her diet. She wants to thank you.

## 2019-05-14 NOTE — TELEPHONE ENCOUNTER
Patient called stating she is in [facility] until end of month and she is feeling a pain between her breast, on the lower part of her sternum. She states she had a hiatal hernia surgery 3 yrs ago and hasnt had any issues with heartburn since then but states this feels a little different than heart burn. She is concerned she is getting an ulcer. She does mention that shes been drinking pineapple/abner juice which is not something she usually consumes. She is wondering if there are foods she should stay away from or foods she should eat in order to relieve this. Or if you could call something in. She isnt sure if this is related to food she is eating or the stress she is under. If you do call something in, she would like it to go to [facility] in [facility] which is listed in her chart.

## 2019-05-14 NOTE — TELEPHONE ENCOUNTER
Kindly let the patient know to start taking the Protonix twice a day in empty stomach and wait for half hour to eat or drink anything else to prevent ulcers and also to relieve discomfort. Advise the patient to avoid foods which cause her discomfort and also spicy and oily foods.

## 2019-06-04 ENCOUNTER — TELEPHONE (OUTPATIENT)
Dept: PRIMARY CARE CLINIC | Age: 74
End: 2019-06-04

## 2019-06-04 DIAGNOSIS — F41.9 ANXIETY DISORDER, UNSPECIFIED TYPE: ICD-10-CM

## 2019-06-04 DIAGNOSIS — G62.9 NEUROPATHY: Primary | ICD-10-CM

## 2019-06-11 ENCOUNTER — OFFICE VISIT (OUTPATIENT)
Dept: PRIMARY CARE CLINIC | Age: 74
End: 2019-06-11
Payer: MEDICARE

## 2019-06-11 VITALS
WEIGHT: 141.4 LBS | OXYGEN SATURATION: 98 % | SYSTOLIC BLOOD PRESSURE: 136 MMHG | HEART RATE: 69 BPM | BODY MASS INDEX: 28.56 KG/M2 | DIASTOLIC BLOOD PRESSURE: 72 MMHG | RESPIRATION RATE: 16 BRPM

## 2019-06-11 DIAGNOSIS — F41.9 ANXIETY AND DEPRESSION: ICD-10-CM

## 2019-06-11 DIAGNOSIS — R53.82 CHRONIC FATIGUE: ICD-10-CM

## 2019-06-11 DIAGNOSIS — I10 ESSENTIAL HYPERTENSION: Primary | ICD-10-CM

## 2019-06-11 DIAGNOSIS — F32.A ANXIETY AND DEPRESSION: ICD-10-CM

## 2019-06-11 LAB
T4 FREE: 0.78 NG/DL (ref 0.61–1.6)
TSH SERPL DL<=0.05 MIU/L-ACNC: 0.88 UIU/ML (ref 0.49–4.67)

## 2019-06-11 PROCEDURE — 1036F TOBACCO NON-USER: CPT | Performed by: INTERNAL MEDICINE

## 2019-06-11 PROCEDURE — 4040F PNEUMOC VAC/ADMIN/RCVD: CPT | Performed by: INTERNAL MEDICINE

## 2019-06-11 PROCEDURE — G8399 PT W/DXA RESULTS DOCUMENT: HCPCS | Performed by: INTERNAL MEDICINE

## 2019-06-11 PROCEDURE — 3017F COLORECTAL CA SCREEN DOC REV: CPT | Performed by: INTERNAL MEDICINE

## 2019-06-11 PROCEDURE — 99214 OFFICE O/P EST MOD 30 MIN: CPT | Performed by: INTERNAL MEDICINE

## 2019-06-11 PROCEDURE — 1090F PRES/ABSN URINE INCON ASSESS: CPT | Performed by: INTERNAL MEDICINE

## 2019-06-11 PROCEDURE — 1123F ACP DISCUSS/DSCN MKR DOCD: CPT | Performed by: INTERNAL MEDICINE

## 2019-06-11 PROCEDURE — G8427 DOCREV CUR MEDS BY ELIG CLIN: HCPCS | Performed by: INTERNAL MEDICINE

## 2019-06-11 PROCEDURE — G8419 CALC BMI OUT NRM PARAM NOF/U: HCPCS | Performed by: INTERNAL MEDICINE

## 2019-06-11 ASSESSMENT — ENCOUNTER SYMPTOMS
ABDOMINAL DISTENTION: 0
CONSTIPATION: 0
DIARRHEA: 0
NAUSEA: 0
SHORTNESS OF BREATH: 0
ABDOMINAL PAIN: 0
VOMITING: 0
SINUS PRESSURE: 0
COUGH: 0
WHEEZING: 0
BACK PAIN: 0
SINUS PAIN: 0

## 2019-06-11 NOTE — PROGRESS NOTES
Visit Information    Have you changed or started any medications since your last visit including any over-the-counter medicines, vitamins, or herbal medicines? no   Are you having any side effects from any of your medications? -  no  Have you stopped taking any of your medications? Is so, why? -  yes - pantoprazole    Have you seen any other physician or provider since your last visit? No  Have you had any other diagnostic tests since your last visit? No  Have you been seen in the emergency room and/or had an admission to a hospital since we last saw you? No  Have you had your routine dental cleaning in the past 6 months? yes -     Have you activated your [employer] account? If not, what are your barriers?  No     Patient Care Team:  Bruce Hernandez MD as PCP - General (Internal Medicine)  Bruce Hernandez MD as PCP - [facility]    Medical History Review  Past Medical, Family, and Social History reviewed and does not contribute to the patient presenting condition    Health Maintenance   Topic Date Due    Hepatitis C screen  1945    DTaP/Tdap/Td vaccine (1 - Tdap) 01/20/1964    Shingles Vaccine (1 of 2) 01/20/1995    Colon cancer screen colonoscopy  01/20/1995    Pneumococcal 65+ years Vaccine (1 of 2 - PCV13) 01/20/2010    Potassium monitoring  08/17/2019    Creatinine monitoring  08/17/2019    Flu vaccine (Season Ended) 09/01/2019    A1C test (Diabetic or Prediabetic)  04/24/2020    Breast cancer screen  07/12/2020    Lipid screen  04/24/2024    DEXA (modify frequency per FRAX score)  Completed

## 2019-06-11 NOTE — PROGRESS NOTES
[facility]  [address] [place] [address] Dr  [address]  [place] [address]  Dept: [phone]  Dept Fax: [phone]    London Xiong is a 76 y.o. female who presents today for her medical conditions/complaints as noted below. Chief Complaint   Patient presents with    Other     left sided pain, bp concerns, and depression       HPI:     This is a 79-year-old female who is here for regular follow-up. Her blood pressure is normal at 136/72 but she reports that her blood pressure was high at the dentist office. She is on 5 mg of Norvasc at this time. She has not had any headaches or any other complaints    She complains of left-sided abdominal discomfort on and off without any diarrhea. She complains of constipation as well. She reports that she is depressed and was recently started on nortriptyline by her psychiatric. No other complaints. She goes to therapist regularly.       Hemoglobin A1C (%)   Date Value   04/24/2019 5.7             ( goal A1C is < 7)   No results found for: LABMICR  LDL Cholesterol (mg/dL)   Date Value   04/24/2019 86       (goal LDL is <100)   AST (U/L)   Date Value   08/03/2018 15     ALT (U/L)   Date Value   08/03/2018 14     BUN (mg/dL)   Date Value   08/17/2018 17     BP Readings from Last 3 Encounters:   06/11/19 136/72   04/23/19 130/82   03/12/19 (!) 155/89          (goal 120/80)    Past Medical History:   Diagnosis Date    Anxiety     Depression     Hyperlipidemia     Hypertension     MS (multiple sclerosis) ([facility]     Osteoarthritis     Osteoporosis       Past Surgical History:   Procedure Laterality Date    EYE SURGERY      x3    HERNIA REPAIR  05/2017    ingial    KNEE SURGERY      menicus right 2007    SHOULDER SURGERY      right rotator cuff 2016       Family History   Adopted: Yes       Social History     Tobacco Use    Smoking status: Never Smoker    Smokeless tobacco: Never Used   Substance Use Topics    congestion, ear pain, hearing loss, nosebleeds, sinus pressure, sinus pain and sneezing. Eyes: Negative for visual disturbance. Respiratory: Negative for cough, shortness of breath and wheezing. Cardiovascular: Negative for chest pain and palpitations. Gastrointestinal: Negative for abdominal distention, abdominal pain, constipation, diarrhea, nausea and vomiting. Endocrine: Negative for cold intolerance, heat intolerance, polydipsia, polyphagia and polyuria. Genitourinary: Negative for difficulty urinating, menstrual problem, vaginal bleeding and vaginal discharge. Musculoskeletal: Negative for back pain and joint swelling. Skin: Negative for rash. Neurological: Negative for numbness and headaches. Psychiatric/Behavioral: Negative for sleep disturbance. The patient is not nervous/anxious. All other systems reviewed and are negative. Objective:     Physical Exam   Constitutional: She is oriented to person, place, and time. Vital signs are normal. She appears well-developed and well-nourished. She is active. No distress. HENT:   Head: Normocephalic and atraumatic. Right Ear: Hearing normal.   Left Ear: Hearing normal.   Mouth/Throat: Uvula is midline, oropharynx is clear and moist and mucous membranes are normal.   Eyes: Pupils are equal, round, and reactive to light. Conjunctivae are normal. No scleral icterus. Neck: Normal range of motion, full passive range of motion without pain and phonation normal. Neck supple. No JVD present. No thyroid mass and no thyromegaly present. Cardiovascular: Normal rate, regular rhythm, normal heart sounds and intact distal pulses. Exam reveals no decreased pulses. No murmur heard. Pulses:       Carotid pulses are 2+ on the right side, and 2+ on the left side. Radial pulses are 2+ on the right side, and 2+ on the left side. Pulmonary/Chest: Effort normal and breath sounds normal. No accessory muscle usage. No apnea.  No respiratory this chart was generated using voice recognition Dragon dictation software. Although every effort was made to ensure the accuracy of this automatedtranscription, some errors in transcription may have occurred.

## 2019-07-15 ENCOUNTER — OFFICE VISIT (OUTPATIENT)
Dept: PRIMARY CARE CLINIC | Age: 74
End: 2019-07-15
Payer: MEDICARE

## 2019-07-15 VITALS
OXYGEN SATURATION: 91 % | HEIGHT: 59 IN | WEIGHT: 140 LBS | HEART RATE: 79 BPM | BODY MASS INDEX: 28.22 KG/M2 | DIASTOLIC BLOOD PRESSURE: 76 MMHG | SYSTOLIC BLOOD PRESSURE: 116 MMHG

## 2019-07-15 DIAGNOSIS — G35 MS (MULTIPLE SCLEROSIS) (HCC): ICD-10-CM

## 2019-07-15 DIAGNOSIS — F32.0 CURRENT MILD EPISODE OF MAJOR DEPRESSIVE DISORDER, UNSPECIFIED WHETHER RECURRENT (HCC): ICD-10-CM

## 2019-07-15 DIAGNOSIS — I10 ESSENTIAL HYPERTENSION: Primary | ICD-10-CM

## 2019-07-15 DIAGNOSIS — E66.3 OVERWEIGHT (BMI 25.0-29.9): ICD-10-CM

## 2019-07-15 PROCEDURE — 1036F TOBACCO NON-USER: CPT | Performed by: INTERNAL MEDICINE

## 2019-07-15 PROCEDURE — G8427 DOCREV CUR MEDS BY ELIG CLIN: HCPCS | Performed by: INTERNAL MEDICINE

## 2019-07-15 PROCEDURE — G8419 CALC BMI OUT NRM PARAM NOF/U: HCPCS | Performed by: INTERNAL MEDICINE

## 2019-07-15 PROCEDURE — 99214 OFFICE O/P EST MOD 30 MIN: CPT | Performed by: INTERNAL MEDICINE

## 2019-07-15 PROCEDURE — 1123F ACP DISCUSS/DSCN MKR DOCD: CPT | Performed by: INTERNAL MEDICINE

## 2019-07-15 PROCEDURE — 3017F COLORECTAL CA SCREEN DOC REV: CPT | Performed by: INTERNAL MEDICINE

## 2019-07-15 PROCEDURE — 4040F PNEUMOC VAC/ADMIN/RCVD: CPT | Performed by: INTERNAL MEDICINE

## 2019-07-15 PROCEDURE — G8399 PT W/DXA RESULTS DOCUMENT: HCPCS | Performed by: INTERNAL MEDICINE

## 2019-07-15 PROCEDURE — 1090F PRES/ABSN URINE INCON ASSESS: CPT | Performed by: INTERNAL MEDICINE

## 2019-07-15 RX ORDER — BIMATOPROST 3 UG/ML
1 SOLUTION TOPICAL NIGHTLY
Refills: 10 | COMMUNITY
Start: 2019-06-14 | End: 2019-08-20 | Stop reason: ALTCHOICE

## 2019-07-15 RX ORDER — NORTRIPTYLINE HYDROCHLORIDE 10 MG/1
10 CAPSULE ORAL DAILY
Refills: 2 | COMMUNITY
Start: 2019-06-06 | End: 2019-11-12 | Stop reason: CLARIF

## 2019-07-16 PROBLEM — F32.0 CURRENT MILD EPISODE OF MAJOR DEPRESSIVE DISORDER (HCC): Status: ACTIVE | Noted: 2019-07-16

## 2019-07-16 NOTE — PROGRESS NOTES
Physical Exam  /76   Pulse 79   Ht 4' 11\" (1.499 m)   Wt 140 lb (63.5 kg)   SpO2 91%   BMI 28.28 kg/m²     Assessment:          1. Essential hypertension  Well-controlled on Norvasc 5 mg daily    2. MS (multiple sclerosis) (HCC)  stable    3. Overweight (BMI 25.0-29. 9)  Exercise    4. Current mild episode of major depressive disorder, unspecified whether recurrent ([facility]  started on nortriptyline            Diagnosis Orders   1. Essential hypertension     2. MS (multiple sclerosis) ([facility]     3. Overweight (BMI 25.0-29.9)     4. Current mild episode of major depressive disorder, unspecified whether recurrent (HCC)       Quality & Risk Score Accuracy    Visit Dx:  G35 - MS (multiple sclerosis) ([facility]  Assessment and plan:  Stable based upon symptoms and exam. Continue current treatment plan and follow up at least yearly. Last edited 07/15/19 08:43 EDT by Vance Acosta MD           Plan:      No follow-ups on file. No orders of the defined types were placed in this encounter. No orders of the defined types were placed in this encounter. Patient given educational materials - see patient instructions. Discussed use, benefit, and side effects of prescribedmedications. All patient questions answered. Pt voiced understanding. Reviewed health maintenance. Instructed to continue current medications, diet and exercise. Patient agreed with treatment plan. Follow up as directed. Of the given duration appointment visit, Dr. Vance Acosta MD  spent at least 50% of the face-to-face time in counseling, explanation of diagnosis, planning of further management, and answering all questions. Electronically signed by Vance Acosta MD on 7/16/2019 at 10:29 AM      Please note that this chart was generated using voice recognition Dragon dictation software.   Although every effort was made to ensure the accuracy of this automatedtranscription, some errors in transcription may have

## 2019-07-18 ENCOUNTER — TELEPHONE (OUTPATIENT)
Dept: PRIMARY CARE CLINIC | Age: 74
End: 2019-07-18

## 2019-08-12 ENCOUNTER — TELEPHONE (OUTPATIENT)
Dept: PRIMARY CARE CLINIC | Age: 74
End: 2019-08-12

## 2019-08-20 ENCOUNTER — OFFICE VISIT (OUTPATIENT)
Dept: PRIMARY CARE CLINIC | Age: 74
End: 2019-08-20
Payer: MEDICARE

## 2019-08-20 VITALS
WEIGHT: 139.4 LBS | RESPIRATION RATE: 16 BRPM | BODY MASS INDEX: 28.16 KG/M2 | DIASTOLIC BLOOD PRESSURE: 70 MMHG | HEART RATE: 50 BPM | SYSTOLIC BLOOD PRESSURE: 122 MMHG | OXYGEN SATURATION: 93 %

## 2019-08-20 DIAGNOSIS — R59.9 REACTIVE LYMPHADENOPATHY: ICD-10-CM

## 2019-08-20 DIAGNOSIS — F41.1 GENERALIZED ANXIETY DISORDER: Primary | ICD-10-CM

## 2019-08-20 PROCEDURE — 1090F PRES/ABSN URINE INCON ASSESS: CPT | Performed by: INTERNAL MEDICINE

## 2019-08-20 PROCEDURE — 99213 OFFICE O/P EST LOW 20 MIN: CPT | Performed by: INTERNAL MEDICINE

## 2019-08-20 PROCEDURE — 1123F ACP DISCUSS/DSCN MKR DOCD: CPT | Performed by: INTERNAL MEDICINE

## 2019-08-20 PROCEDURE — 1036F TOBACCO NON-USER: CPT | Performed by: INTERNAL MEDICINE

## 2019-08-20 PROCEDURE — 3017F COLORECTAL CA SCREEN DOC REV: CPT | Performed by: INTERNAL MEDICINE

## 2019-08-20 PROCEDURE — G8427 DOCREV CUR MEDS BY ELIG CLIN: HCPCS | Performed by: INTERNAL MEDICINE

## 2019-08-20 PROCEDURE — G8419 CALC BMI OUT NRM PARAM NOF/U: HCPCS | Performed by: INTERNAL MEDICINE

## 2019-08-20 PROCEDURE — 4040F PNEUMOC VAC/ADMIN/RCVD: CPT | Performed by: INTERNAL MEDICINE

## 2019-08-20 PROCEDURE — G8399 PT W/DXA RESULTS DOCUMENT: HCPCS | Performed by: INTERNAL MEDICINE

## 2019-08-20 RX ORDER — ONDANSETRON 4 MG/1
4 TABLET, ORALLY DISINTEGRATING ORAL EVERY 8 HOURS PRN
Qty: 12 TABLET | Refills: 0 | Status: SHIPPED | OUTPATIENT
Start: 2019-08-20 | End: 2020-07-06 | Stop reason: SDUPTHER

## 2019-08-21 ENCOUNTER — TELEPHONE (OUTPATIENT)
Dept: SURGERY | Age: 74
End: 2019-08-21

## 2019-08-21 NOTE — TELEPHONE ENCOUNTER
Attempted to reach patient to schedule screening colonoscopy visit with provider. Advised to contact the office to schedule at [phone]. Attempt 1.

## 2019-08-22 ASSESSMENT — ENCOUNTER SYMPTOMS
SINUS PAIN: 0
WHEEZING: 0
BACK PAIN: 0
SINUS PRESSURE: 0
COUGH: 0
ABDOMINAL DISTENTION: 0
ABDOMINAL PAIN: 0
CONSTIPATION: 0
NAUSEA: 0
VOMITING: 0
SHORTNESS OF BREATH: 0
DIARRHEA: 0

## 2019-08-23 NOTE — PROGRESS NOTES
note that this chart was generated using voice recognition Dragon dictation software. Although every effort was made to ensure the accuracy of this automatedtranscription, some errors in transcription may have occurred.

## 2019-09-25 ENCOUNTER — OFFICE VISIT (OUTPATIENT)
Dept: PRIMARY CARE CLINIC | Age: 74
End: 2019-09-25
Payer: MEDICARE

## 2019-09-25 VITALS
RESPIRATION RATE: 16 BRPM | BODY MASS INDEX: 27.95 KG/M2 | HEART RATE: 101 BPM | SYSTOLIC BLOOD PRESSURE: 118 MMHG | OXYGEN SATURATION: 97 % | WEIGHT: 138.4 LBS | DIASTOLIC BLOOD PRESSURE: 80 MMHG

## 2019-09-25 DIAGNOSIS — I10 ESSENTIAL HYPERTENSION: ICD-10-CM

## 2019-09-25 DIAGNOSIS — B02.9 HERPES ZOSTER WITHOUT COMPLICATION: Primary | ICD-10-CM

## 2019-09-25 DIAGNOSIS — Z23 NEED FOR INFLUENZA VACCINATION: ICD-10-CM

## 2019-09-25 DIAGNOSIS — F03.90 DEMENTIA WITHOUT BEHAVIORAL DISTURBANCE, UNSPECIFIED DEMENTIA TYPE: ICD-10-CM

## 2019-09-25 PROCEDURE — 4040F PNEUMOC VAC/ADMIN/RCVD: CPT | Performed by: INTERNAL MEDICINE

## 2019-09-25 PROCEDURE — 1036F TOBACCO NON-USER: CPT | Performed by: INTERNAL MEDICINE

## 2019-09-25 PROCEDURE — 3017F COLORECTAL CA SCREEN DOC REV: CPT | Performed by: INTERNAL MEDICINE

## 2019-09-25 PROCEDURE — 99214 OFFICE O/P EST MOD 30 MIN: CPT | Performed by: INTERNAL MEDICINE

## 2019-09-25 PROCEDURE — 1090F PRES/ABSN URINE INCON ASSESS: CPT | Performed by: INTERNAL MEDICINE

## 2019-09-25 PROCEDURE — G8419 CALC BMI OUT NRM PARAM NOF/U: HCPCS | Performed by: INTERNAL MEDICINE

## 2019-09-25 PROCEDURE — 1123F ACP DISCUSS/DSCN MKR DOCD: CPT | Performed by: INTERNAL MEDICINE

## 2019-09-25 PROCEDURE — G8427 DOCREV CUR MEDS BY ELIG CLIN: HCPCS | Performed by: INTERNAL MEDICINE

## 2019-09-25 PROCEDURE — G8399 PT W/DXA RESULTS DOCUMENT: HCPCS | Performed by: INTERNAL MEDICINE

## 2019-09-25 ASSESSMENT — ENCOUNTER SYMPTOMS
SINUS PAIN: 0
BACK PAIN: 0
NAUSEA: 0
CONSTIPATION: 0
VOMITING: 0
ABDOMINAL PAIN: 0
SHORTNESS OF BREATH: 0
ABDOMINAL DISTENTION: 0
DIARRHEA: 0
WHEEZING: 0
COUGH: 0
SINUS PRESSURE: 0

## 2019-09-25 NOTE — PROGRESS NOTES
[facility]  [facility] [address]  [address] [address]  Dept: [phone]  Dept Fax: [phone]    Irene Shannon is a 76 y.o. female who presents today for her medical conditions/complaints as noted below. Chief Complaint   Patient presents with    Other     pt c/o neck pain, fatigue, rash on back of neck. pt concerned about shingles       HPI:     This is a 29-year-old female who is here for complaints of rash on her back of her neck which is been going on for past 3 to 4 days and she has been having fatigue and neck pain as well. Next line she is having some tingling and numbness in that area also. No fever or chills. Her blood pressure today is at 118/80 and pulse at 101. She has had shingles in the past and she feels the same way as she felt during that time. She is also complaining of dementia and would like to see a neurologist for the same. It has been getting worse since few years and would like to get checked out. No other complaints or concerns at this time. Hemoglobin A1C (%)   Date Value   04/24/2019 5.7             ( goal A1C is < 7)   No results found for: LABMICR  LDL Cholesterol (mg/dL)   Date Value   04/24/2019 86       (goal LDL is <100)   AST (U/L)   Date Value   08/03/2018 15     ALT (U/L)   Date Value   08/03/2018 14     BUN (mg/dL)   Date Value   08/17/2018 17     BP Readings from Last 3 Encounters:   09/25/19 118/80   08/20/19 122/70   07/15/19 116/76          (goal 120/80)    Past Medical History:   Diagnosis Date    Anxiety     Depression     Hyperlipidemia     Hypertension     MS (multiple sclerosis) ([facility]     Osteoarthritis     Osteoporosis       Past Surgical History:   Procedure Laterality Date    EYE SURGERY      x3    HERNIA REPAIR  05/2017    ingial    KNEE SURGERY      menicus right 2007    SHOULDER SURGERY      right rotator cuff 2016       Family History   Adopted:  Yes Lipid screen  04/24/2020    Breast cancer screen  07/12/2020    DEXA (modify frequency per FRAX score)  Completed       Subjective:      Review of Systems   Constitutional: Negative for activity change, appetite change, chills, fatigue and fever. HENT: Negative for congestion, ear pain, hearing loss, nosebleeds, sinus pressure, sinus pain and sneezing. Eyes: Negative for visual disturbance. Respiratory: Negative for cough, shortness of breath and wheezing. Cardiovascular: Negative for chest pain and palpitations. Gastrointestinal: Negative for abdominal distention, abdominal pain, constipation, diarrhea, nausea and vomiting. Endocrine: Negative for cold intolerance, heat intolerance, polydipsia, polyphagia and polyuria. Genitourinary: Negative for difficulty urinating, menstrual problem, vaginal bleeding and vaginal discharge. Musculoskeletal: Negative for back pain and joint swelling. Skin: Positive for rash (Right-sided back of her neck). Neurological: Negative for numbness and headaches. Psychiatric/Behavioral: Negative for sleep disturbance. The patient is not nervous/anxious. Objective:     Physical Exam   Constitutional: She is oriented to person, place, and time. Vital signs are normal. She appears well-developed and well-nourished. She is active. No distress. HENT:   Head: Normocephalic and atraumatic. Right Ear: Hearing normal.   Left Ear: Hearing normal.   Mouth/Throat: Uvula is midline, oropharynx is clear and moist and mucous membranes are normal.   Eyes: Pupils are equal, round, and reactive to light. Conjunctivae are normal. No scleral icterus. Neck: Normal range of motion, full passive range of motion without pain and phonation normal. Neck supple. No JVD present. No thyroid mass and no thyromegaly present. Cardiovascular: Normal rate, regular rhythm, normal heart sounds and intact distal pulses. Exam reveals no decreased pulses. No murmur heard.   Pulses: 12/25/2019) for Routine follow-up. Orders Placed This Encounter   Procedures    INFLUENZA, TRIV, INACTIVATED, SUBUNIT, ADJUVANTED, 65 YRS AND OLDER, IM, PREFILL SYR, 0.5ML (FLUAD TRIV)    Basic Metabolic Panel     Standing Status:   Future     Standing Expiration Date:   9/25/2020   Kelly Mcbride MD, Neurology, [facility]     Referral Priority:   Routine     Referral Type:   Eval and Treat     Referral Reason:   Specialty Services Required     Referred to Provider:   Alveda Halsted, MD     Requested Specialty:   Neurology     Number of Visits Requested:   1     No orders of the defined types were placed in this encounter. Patient given educational materials - see patient instructions. Discussed use, benefit, and side effects of prescribedmedications. All patient questions answered. Pt voiced understanding. Reviewed health maintenance. Instructed to continue current medications, diet and exercise. Patient agreed with treatment plan. Follow up as directed. I spent a total of 25 minutes face to face with this patient. Over 50% of that time was spent on counseling and care coordination. Please see assessment and plan for details. Electronically signed by Cat Mason MD on 9/25/2019 at 2:26 PM      Please note that this chart was generated using voice recognition Dragon dictation software. Although every effort was made to ensure the accuracy of this automatedtranscription, some errors in transcription may have occurred.

## 2019-10-01 LAB
ANION GAP SERPL CALCULATED.3IONS-SCNC: 9 MMOL/L (ref 4–12)
BUN BLDV-MCNC: 16 MG/DL (ref 5–27)
CALCIUM SERPL-MCNC: 10.5 MG/DL (ref 8.5–10.5)
CHLORIDE BLD-SCNC: 103 MMOL/L (ref 98–109)
CO2: 29 MMOL/L (ref 22–32)
CREAT SERPL-MCNC: 1 MG/DL (ref 0.4–1)
EGFR AFRICAN AMERICAN: >60 ML/MIN/1.73SQ.M
EGFR IF NONAFRICAN AMERICAN: 54 ML/MIN/1.73SQ.M
GLUCOSE: 129 MG/DL (ref 65–99)
POTASSIUM SERPL-SCNC: 4.3 MMOL/L (ref 3.5–5)
SODIUM BLD-SCNC: 141 MMOL/L (ref 134–146)

## 2019-10-02 ENCOUNTER — HOSPITAL ENCOUNTER (OUTPATIENT)
Age: 74
Setting detail: SPECIMEN
Discharge: HOME | End: 2019-10-02
Payer: MEDICARE

## 2019-10-02 ENCOUNTER — TELEPHONE (OUTPATIENT)
Dept: PRIMARY CARE CLINIC | Age: 74
End: 2019-10-02

## 2019-10-02 ENCOUNTER — OFFICE VISIT (OUTPATIENT)
Dept: FAMILY MEDICINE CLINIC | Age: 74
End: 2019-10-02
Payer: MEDICARE

## 2019-10-02 VITALS
WEIGHT: 140.2 LBS | HEIGHT: 59 IN | TEMPERATURE: 98.7 F | DIASTOLIC BLOOD PRESSURE: 78 MMHG | BODY MASS INDEX: 28.26 KG/M2 | SYSTOLIC BLOOD PRESSURE: 136 MMHG | RESPIRATION RATE: 18 BRPM | HEART RATE: 100 BPM

## 2019-10-02 DIAGNOSIS — R35.0 URINARY FREQUENCY: ICD-10-CM

## 2019-10-02 DIAGNOSIS — N39.0 URINARY TRACT INFECTION WITHOUT HEMATURIA, SITE UNSPECIFIED: Primary | ICD-10-CM

## 2019-10-02 DIAGNOSIS — R10.33 PERIUMBILICAL ABDOMINAL PAIN: ICD-10-CM

## 2019-10-02 LAB
APPEARANCE FLUID: CLEAR
BILIRUBIN, POC: NORMAL
BLOOD URINE, POC: NORMAL
CLARITY, POC: CLEAR
COLOR, POC: YELLOW
GLUCOSE URINE, POC: NORMAL
KETONES, POC: NORMAL
LEUKOCYTE EST, POC: NORMAL
NITRITE, POC: NORMAL
PH, POC: 6
PROTEIN, POC: NORMAL
SPECIFIC GRAVITY, POC: 1.01
UROBILINOGEN, POC: 0.2

## 2019-10-02 PROCEDURE — G8484 FLU IMMUNIZE NO ADMIN: HCPCS | Performed by: PHYSICIAN ASSISTANT

## 2019-10-02 PROCEDURE — 4040F PNEUMOC VAC/ADMIN/RCVD: CPT | Performed by: PHYSICIAN ASSISTANT

## 2019-10-02 PROCEDURE — 1036F TOBACCO NON-USER: CPT | Performed by: PHYSICIAN ASSISTANT

## 2019-10-02 PROCEDURE — 1123F ACP DISCUSS/DSCN MKR DOCD: CPT | Performed by: PHYSICIAN ASSISTANT

## 2019-10-02 PROCEDURE — 99213 OFFICE O/P EST LOW 20 MIN: CPT | Performed by: PHYSICIAN ASSISTANT

## 2019-10-02 PROCEDURE — 81002 URINALYSIS NONAUTO W/O SCOPE: CPT | Performed by: PHYSICIAN ASSISTANT

## 2019-10-02 PROCEDURE — G8427 DOCREV CUR MEDS BY ELIG CLIN: HCPCS | Performed by: PHYSICIAN ASSISTANT

## 2019-10-02 PROCEDURE — G8399 PT W/DXA RESULTS DOCUMENT: HCPCS | Performed by: PHYSICIAN ASSISTANT

## 2019-10-02 PROCEDURE — G8419 CALC BMI OUT NRM PARAM NOF/U: HCPCS | Performed by: PHYSICIAN ASSISTANT

## 2019-10-02 PROCEDURE — 3017F COLORECTAL CA SCREEN DOC REV: CPT | Performed by: PHYSICIAN ASSISTANT

## 2019-10-02 PROCEDURE — 1090F PRES/ABSN URINE INCON ASSESS: CPT | Performed by: PHYSICIAN ASSISTANT

## 2019-10-02 RX ORDER — PHENAZOPYRIDINE HYDROCHLORIDE 200 MG/1
200 TABLET, FILM COATED ORAL 3 TIMES DAILY PRN
Qty: 6 TABLET | Refills: 0 | Status: SHIPPED | OUTPATIENT
Start: 2019-10-02 | End: 2019-10-04

## 2019-10-02 RX ORDER — NITROFURANTOIN 25; 75 MG/1; MG/1
100 CAPSULE ORAL 2 TIMES DAILY
Qty: 14 CAPSULE | Refills: 0 | Status: SHIPPED | OUTPATIENT
Start: 2019-10-02 | End: 2019-10-09

## 2019-10-03 DIAGNOSIS — I10 ESSENTIAL HYPERTENSION: Primary | ICD-10-CM

## 2019-10-03 LAB
CULTURE: ABNORMAL
Lab: ABNORMAL
SPECIMEN DESCRIPTION: ABNORMAL

## 2019-10-08 ENCOUNTER — TELEPHONE (OUTPATIENT)
Dept: PRIMARY CARE CLINIC | Age: 74
End: 2019-10-08

## 2019-10-08 ASSESSMENT — ENCOUNTER SYMPTOMS
EYES NEGATIVE: 1
BLOOD IN STOOL: 0
CONSTIPATION: 0
CHEST TIGHTNESS: 0
COUGH: 0
ANAL BLEEDING: 0
DIARRHEA: 0
VOMITING: 0
SHORTNESS OF BREATH: 0
ABDOMINAL DISTENTION: 0
NAUSEA: 0
RECTAL PAIN: 0
WHEEZING: 0

## 2019-10-11 ENCOUNTER — TELEPHONE (OUTPATIENT)
Dept: PRIMARY CARE CLINIC | Age: 74
End: 2019-10-11

## 2019-10-16 ENCOUNTER — TELEPHONE (OUTPATIENT)
Dept: PRIMARY CARE CLINIC | Age: 74
End: 2019-10-16

## 2019-11-12 ENCOUNTER — OFFICE VISIT (OUTPATIENT)
Dept: NEUROLOGY | Age: 74
End: 2019-11-12
Payer: MEDICARE

## 2019-11-12 VITALS
WEIGHT: 141.2 LBS | DIASTOLIC BLOOD PRESSURE: 85 MMHG | BODY MASS INDEX: 28.47 KG/M2 | HEIGHT: 59 IN | SYSTOLIC BLOOD PRESSURE: 124 MMHG | HEART RATE: 79 BPM

## 2019-11-12 DIAGNOSIS — R41.3 MEMORY LOSS: Primary | ICD-10-CM

## 2019-11-12 DIAGNOSIS — R26.81 GAIT INSTABILITY: ICD-10-CM

## 2019-11-12 DIAGNOSIS — G35 MULTIPLE SCLEROSIS (HCC): ICD-10-CM

## 2019-11-12 PROCEDURE — G8417 CALC BMI ABV UP PARAM F/U: HCPCS | Performed by: PSYCHIATRY & NEUROLOGY

## 2019-11-12 PROCEDURE — G8427 DOCREV CUR MEDS BY ELIG CLIN: HCPCS | Performed by: PSYCHIATRY & NEUROLOGY

## 2019-11-12 PROCEDURE — 1090F PRES/ABSN URINE INCON ASSESS: CPT | Performed by: PSYCHIATRY & NEUROLOGY

## 2019-11-12 PROCEDURE — 4040F PNEUMOC VAC/ADMIN/RCVD: CPT | Performed by: PSYCHIATRY & NEUROLOGY

## 2019-11-12 PROCEDURE — 1123F ACP DISCUSS/DSCN MKR DOCD: CPT | Performed by: PSYCHIATRY & NEUROLOGY

## 2019-11-12 PROCEDURE — G8484 FLU IMMUNIZE NO ADMIN: HCPCS | Performed by: PSYCHIATRY & NEUROLOGY

## 2019-11-12 PROCEDURE — 3017F COLORECTAL CA SCREEN DOC REV: CPT | Performed by: PSYCHIATRY & NEUROLOGY

## 2019-11-12 PROCEDURE — 99204 OFFICE O/P NEW MOD 45 MIN: CPT | Performed by: PSYCHIATRY & NEUROLOGY

## 2019-11-12 PROCEDURE — 1036F TOBACCO NON-USER: CPT | Performed by: PSYCHIATRY & NEUROLOGY

## 2019-11-12 PROCEDURE — G8399 PT W/DXA RESULTS DOCUMENT: HCPCS | Performed by: PSYCHIATRY & NEUROLOGY

## 2019-11-12 RX ORDER — ATORVASTATIN CALCIUM 10 MG/1
10 TABLET, FILM COATED ORAL DAILY
COMMUNITY
End: 2021-03-26

## 2019-11-12 RX ORDER — CITALOPRAM 20 MG/1
20 TABLET ORAL DAILY
Qty: 30 TABLET | Refills: 3 | Status: SHIPPED | OUTPATIENT
Start: 2019-11-12 | End: 2020-06-04 | Stop reason: SDUPTHER

## 2019-11-12 ASSESSMENT — ENCOUNTER SYMPTOMS
RESPIRATORY NEGATIVE: 1
BACK PAIN: 1
GASTROINTESTINAL NEGATIVE: 1
EYES NEGATIVE: 1
ALLERGIC/IMMUNOLOGIC NEGATIVE: 1

## 2019-12-05 ENCOUNTER — HOSPITAL ENCOUNTER (OUTPATIENT)
Dept: MRI IMAGING | Age: 74
Discharge: HOME | End: 2019-12-07
Payer: MEDICARE

## 2019-12-05 ENCOUNTER — HOSPITAL ENCOUNTER (OUTPATIENT)
Age: 74
Discharge: HOME | End: 2019-12-05
Payer: MEDICARE

## 2019-12-05 DIAGNOSIS — I10 ESSENTIAL HYPERTENSION: ICD-10-CM

## 2019-12-05 DIAGNOSIS — R41.3 MEMORY LOSS: ICD-10-CM

## 2019-12-05 DIAGNOSIS — G35 MULTIPLE SCLEROSIS (HCC): ICD-10-CM

## 2019-12-05 LAB
ANION GAP SERPL CALCULATED.3IONS-SCNC: 11 MMOL/L (ref 9–17)
BUN BLDV-MCNC: 16 MG/DL (ref 8–23)
BUN/CREAT BLD: ABNORMAL (ref 9–20)
CALCIUM SERPL-MCNC: 9.7 MG/DL (ref 8.6–10.4)
CHLORIDE BLD-SCNC: 102 MMOL/L (ref 98–107)
CO2: 28 MMOL/L (ref 20–31)
CREAT SERPL-MCNC: 0.77 MG/DL (ref 0.5–0.9)
FOLATE: 16.8 NG/ML
GFR AFRICAN AMERICAN: >60 ML/MIN
GFR NON-AFRICAN AMERICAN: >60 ML/MIN
GFR SERPL CREATININE-BSD FRML MDRD: ABNORMAL ML/MIN/{1.73_M2}
GFR SERPL CREATININE-BSD FRML MDRD: ABNORMAL ML/MIN/{1.73_M2}
GLUCOSE BLD-MCNC: 97 MG/DL (ref 70–99)
POTASSIUM SERPL-SCNC: 3.6 MMOL/L (ref 3.7–5.3)
SEDIMENTATION RATE, ERYTHROCYTE: 15 MM (ref 0–20)
SODIUM BLD-SCNC: 141 MMOL/L (ref 135–144)
T. PALLIDUM, IGG: NONREACTIVE
TSH SERPL DL<=0.05 MIU/L-ACNC: 1.12 MIU/L (ref 0.3–5)
VITAMIN B-12: 432 PG/ML (ref 232–1245)

## 2019-12-05 PROCEDURE — 36415 COLL VENOUS BLD VENIPUNCTURE: CPT

## 2019-12-05 PROCEDURE — 85651 RBC SED RATE NONAUTOMATED: CPT

## 2019-12-05 PROCEDURE — 6360000004 HC RX CONTRAST MEDICATION: Performed by: PSYCHIATRY & NEUROLOGY

## 2019-12-05 PROCEDURE — 86038 ANTINUCLEAR ANTIBODIES: CPT

## 2019-12-05 PROCEDURE — 72156 MRI NECK SPINE W/O & W/DYE: CPT

## 2019-12-05 PROCEDURE — 82746 ASSAY OF FOLIC ACID SERUM: CPT

## 2019-12-05 PROCEDURE — 82607 VITAMIN B-12: CPT

## 2019-12-05 PROCEDURE — A9579 GAD-BASE MR CONTRAST NOS,1ML: HCPCS | Performed by: PSYCHIATRY & NEUROLOGY

## 2019-12-05 PROCEDURE — 86780 TREPONEMA PALLIDUM: CPT

## 2019-12-05 PROCEDURE — 80048 BASIC METABOLIC PNL TOTAL CA: CPT

## 2019-12-05 PROCEDURE — 84443 ASSAY THYROID STIM HORMONE: CPT

## 2019-12-05 PROCEDURE — 2580000003 HC RX 258: Performed by: PSYCHIATRY & NEUROLOGY

## 2019-12-05 PROCEDURE — 70553 MRI BRAIN STEM W/O & W/DYE: CPT

## 2019-12-05 RX ORDER — SODIUM CHLORIDE 0.9 % (FLUSH) 0.9 %
10 SYRINGE (ML) INJECTION ONCE
Status: COMPLETED | OUTPATIENT
Start: 2019-12-05 | End: 2019-12-05

## 2019-12-05 RX ADMIN — Medication 10 ML: at 12:58

## 2019-12-05 RX ADMIN — GADOTERIDOL 12 ML: 279.3 INJECTION, SOLUTION INTRAVENOUS at 12:58

## 2019-12-06 ENCOUNTER — TELEPHONE (OUTPATIENT)
Dept: NEUROLOGY | Age: 74
End: 2019-12-06

## 2019-12-06 DIAGNOSIS — G95.89 CERVICAL CORD MYELOMALACIA (HCC): ICD-10-CM

## 2019-12-06 DIAGNOSIS — M48.02 CERVICAL STENOSIS OF SPINAL CANAL: Primary | ICD-10-CM

## 2019-12-06 LAB — ANTI-NUCLEAR ANTIBODY (ANA): NEGATIVE

## 2019-12-09 ENCOUNTER — HOSPITAL ENCOUNTER (OUTPATIENT)
Dept: NEUROLOGY | Age: 74
Discharge: HOME | End: 2019-12-09
Payer: MEDICARE

## 2019-12-09 DIAGNOSIS — G35 MULTIPLE SCLEROSIS (HCC): ICD-10-CM

## 2019-12-09 DIAGNOSIS — R41.3 MEMORY LOSS: ICD-10-CM

## 2019-12-09 PROCEDURE — 95819 EEG AWAKE AND ASLEEP: CPT

## 2019-12-09 PROCEDURE — 95819 EEG AWAKE AND ASLEEP: CPT | Performed by: PSYCHIATRY & NEUROLOGY

## 2019-12-09 RX ORDER — POTASSIUM CHLORIDE 750 MG/1
10 TABLET, EXTENDED RELEASE ORAL DAILY
Qty: 30 TABLET | Refills: 0 | Status: SHIPPED | OUTPATIENT
Start: 2019-12-09 | End: 2020-01-03

## 2019-12-10 ENCOUNTER — TELEPHONE (OUTPATIENT)
Dept: PRIMARY CARE CLINIC | Age: 74
End: 2019-12-10

## 2019-12-13 ENCOUNTER — OFFICE VISIT (OUTPATIENT)
Dept: NEUROLOGY | Age: 74
End: 2019-12-13
Payer: MEDICARE

## 2019-12-13 VITALS
HEART RATE: 82 BPM | BODY MASS INDEX: 28.47 KG/M2 | DIASTOLIC BLOOD PRESSURE: 76 MMHG | HEIGHT: 59 IN | WEIGHT: 141.2 LBS | SYSTOLIC BLOOD PRESSURE: 113 MMHG

## 2019-12-13 DIAGNOSIS — G35 MULTIPLE SCLEROSIS (HCC): ICD-10-CM

## 2019-12-13 DIAGNOSIS — R41.3 MEMORY LOSS: ICD-10-CM

## 2019-12-13 DIAGNOSIS — G95.9 CERVICAL MYELOPATHY (HCC): Primary | ICD-10-CM

## 2019-12-13 DIAGNOSIS — R42 DIZZINESS: ICD-10-CM

## 2019-12-13 PROCEDURE — G8399 PT W/DXA RESULTS DOCUMENT: HCPCS | Performed by: PSYCHIATRY & NEUROLOGY

## 2019-12-13 PROCEDURE — 1036F TOBACCO NON-USER: CPT | Performed by: PSYCHIATRY & NEUROLOGY

## 2019-12-13 PROCEDURE — 99214 OFFICE O/P EST MOD 30 MIN: CPT | Performed by: PSYCHIATRY & NEUROLOGY

## 2019-12-13 PROCEDURE — 4040F PNEUMOC VAC/ADMIN/RCVD: CPT | Performed by: PSYCHIATRY & NEUROLOGY

## 2019-12-13 PROCEDURE — G8484 FLU IMMUNIZE NO ADMIN: HCPCS | Performed by: PSYCHIATRY & NEUROLOGY

## 2019-12-13 PROCEDURE — 3017F COLORECTAL CA SCREEN DOC REV: CPT | Performed by: PSYCHIATRY & NEUROLOGY

## 2019-12-13 PROCEDURE — 1123F ACP DISCUSS/DSCN MKR DOCD: CPT | Performed by: PSYCHIATRY & NEUROLOGY

## 2019-12-13 PROCEDURE — G8417 CALC BMI ABV UP PARAM F/U: HCPCS | Performed by: PSYCHIATRY & NEUROLOGY

## 2019-12-13 PROCEDURE — G8427 DOCREV CUR MEDS BY ELIG CLIN: HCPCS | Performed by: PSYCHIATRY & NEUROLOGY

## 2019-12-13 PROCEDURE — 1090F PRES/ABSN URINE INCON ASSESS: CPT | Performed by: PSYCHIATRY & NEUROLOGY

## 2019-12-13 RX ORDER — MECLIZINE HYDROCHLORIDE 25 MG/1
TABLET ORAL
Qty: 60 TABLET | Refills: 1 | Status: SHIPPED | OUTPATIENT
Start: 2019-12-13 | End: 2020-06-04 | Stop reason: ALTCHOICE

## 2019-12-13 ASSESSMENT — ENCOUNTER SYMPTOMS
ALLERGIC/IMMUNOLOGIC NEGATIVE: 1
BACK PAIN: 1
EYES NEGATIVE: 1
GASTROINTESTINAL NEGATIVE: 1
RESPIRATORY NEGATIVE: 1

## 2019-12-16 ENCOUNTER — TELEPHONE (OUTPATIENT)
Dept: NEUROLOGY | Age: 74
End: 2019-12-16

## 2019-12-23 ENCOUNTER — OFFICE VISIT (OUTPATIENT)
Dept: NEUROSURGERY | Age: 74
End: 2019-12-23
Payer: MEDICARE

## 2019-12-23 VITALS
OXYGEN SATURATION: 98 % | HEIGHT: 59 IN | BODY MASS INDEX: 28.87 KG/M2 | HEART RATE: 67 BPM | DIASTOLIC BLOOD PRESSURE: 78 MMHG | WEIGHT: 143.2 LBS | SYSTOLIC BLOOD PRESSURE: 131 MMHG

## 2019-12-23 DIAGNOSIS — M48.02 STENOSIS OF CERVICAL SPINE WITH MYELOPATHY (HCC): Primary | ICD-10-CM

## 2019-12-23 DIAGNOSIS — G99.2 STENOSIS OF CERVICAL SPINE WITH MYELOPATHY (HCC): Primary | ICD-10-CM

## 2019-12-23 PROCEDURE — 4040F PNEUMOC VAC/ADMIN/RCVD: CPT | Performed by: NEUROLOGICAL SURGERY

## 2019-12-23 PROCEDURE — G8417 CALC BMI ABV UP PARAM F/U: HCPCS | Performed by: NEUROLOGICAL SURGERY

## 2019-12-23 PROCEDURE — 1036F TOBACCO NON-USER: CPT | Performed by: NEUROLOGICAL SURGERY

## 2019-12-23 PROCEDURE — 1090F PRES/ABSN URINE INCON ASSESS: CPT | Performed by: NEUROLOGICAL SURGERY

## 2019-12-23 PROCEDURE — G8484 FLU IMMUNIZE NO ADMIN: HCPCS | Performed by: NEUROLOGICAL SURGERY

## 2019-12-23 PROCEDURE — G8427 DOCREV CUR MEDS BY ELIG CLIN: HCPCS | Performed by: NEUROLOGICAL SURGERY

## 2019-12-23 PROCEDURE — 3017F COLORECTAL CA SCREEN DOC REV: CPT | Performed by: NEUROLOGICAL SURGERY

## 2019-12-23 PROCEDURE — 1123F ACP DISCUSS/DSCN MKR DOCD: CPT | Performed by: NEUROLOGICAL SURGERY

## 2019-12-23 PROCEDURE — 99203 OFFICE O/P NEW LOW 30 MIN: CPT | Performed by: NEUROLOGICAL SURGERY

## 2019-12-23 PROCEDURE — G8399 PT W/DXA RESULTS DOCUMENT: HCPCS | Performed by: NEUROLOGICAL SURGERY

## 2020-01-03 RX ORDER — POTASSIUM CHLORIDE 750 MG/1
TABLET, EXTENDED RELEASE ORAL
Qty: 30 TABLET | Refills: 0 | Status: SHIPPED | OUTPATIENT
Start: 2020-01-03 | End: 2020-06-04 | Stop reason: ALTCHOICE

## 2020-01-08 ENCOUNTER — OFFICE VISIT (OUTPATIENT)
Dept: PRIMARY CARE CLINIC | Age: 75
End: 2020-01-08
Payer: MEDICARE

## 2020-01-08 VITALS
HEIGHT: 57 IN | RESPIRATION RATE: 18 BRPM | WEIGHT: 140 LBS | OXYGEN SATURATION: 90 % | BODY MASS INDEX: 30.2 KG/M2 | DIASTOLIC BLOOD PRESSURE: 86 MMHG | SYSTOLIC BLOOD PRESSURE: 124 MMHG | HEART RATE: 58 BPM

## 2020-01-08 PROCEDURE — G8417 CALC BMI ABV UP PARAM F/U: HCPCS | Performed by: INTERNAL MEDICINE

## 2020-01-08 PROCEDURE — 1123F ACP DISCUSS/DSCN MKR DOCD: CPT | Performed by: INTERNAL MEDICINE

## 2020-01-08 PROCEDURE — G8484 FLU IMMUNIZE NO ADMIN: HCPCS | Performed by: INTERNAL MEDICINE

## 2020-01-08 PROCEDURE — 1036F TOBACCO NON-USER: CPT | Performed by: INTERNAL MEDICINE

## 2020-01-08 PROCEDURE — 1090F PRES/ABSN URINE INCON ASSESS: CPT | Performed by: INTERNAL MEDICINE

## 2020-01-08 PROCEDURE — 99214 OFFICE O/P EST MOD 30 MIN: CPT | Performed by: INTERNAL MEDICINE

## 2020-01-08 PROCEDURE — G8427 DOCREV CUR MEDS BY ELIG CLIN: HCPCS | Performed by: INTERNAL MEDICINE

## 2020-01-08 PROCEDURE — 3017F COLORECTAL CA SCREEN DOC REV: CPT | Performed by: INTERNAL MEDICINE

## 2020-01-08 PROCEDURE — G8399 PT W/DXA RESULTS DOCUMENT: HCPCS | Performed by: INTERNAL MEDICINE

## 2020-01-08 PROCEDURE — 4040F PNEUMOC VAC/ADMIN/RCVD: CPT | Performed by: INTERNAL MEDICINE

## 2020-01-13 NOTE — PROGRESS NOTES
[facility]  [address] [place] [address]   [address] [facility] [address]  [place] [address]  Dept: [phone]  Dept Fax: [phone]    Anna Nicholas is a 76 y.o. female who presents today for her medical conditions/complaints as noted below. Chief Complaint   Patient presents with    Establish Care       HPI:     This is a 70-year-old female who is here to [facility]. She has past medical history of essential hypertension, multiple sclerosis, osteoporosis, hyperlipidemia. She has been complaining of poorly diphtheria and she is strong family history of diabetes. Reviewed medications for her essential hypertension, osteoporosis and hyperlipidemia. She takes Xanax 0.5 mg 3 times a day as needed for anxiety. Her blood pressures at 130/82 and pulse at 92. No other complaints. Hemoglobin A1C (%)   Date Value   04/24/2019 5.7             ( goal A1C is < 7)   No results found for: LABMICR  LDL Cholesterol (mg/dL)   Date Value   04/24/2019 86       (goal LDL is <100)   AST (U/L)   Date Value   08/03/2018 15     ALT (U/L)   Date Value   08/03/2018 14     BUN (mg/dL)   Date Value   08/17/2018 17     BP Readings from Last 3 Encounters:   04/23/19 130/82   03/12/19 (!) 155/89   11/27/18 (!) 150/75          (goal 120/80)    Past Medical History:   Diagnosis Date    Anxiety     Depression     Hyperlipidemia     Hypertension     MS (multiple sclerosis) ([facility] [address]     Osteoarthritis     Osteoporosis       Past Surgical History:   Procedure Laterality Date    EYE SURGERY      x3    HERNIA REPAIR  05/2017    ingial    KNEE SURGERY      menicus right 2007    SHOULDER SURGERY      right rotator cuff 2016       Family History   Adopted: Yes       Social History     Tobacco Use    Smoking status: Never Smoker    Smokeless tobacco: Never Used   Substance Use Topics    Alcohol use:  Yes     Alcohol/week: 0.6 oz     Types: 1 Shots of liquor per week     Frequency: 2-3 times a week     Drinks per session: 1 or 2     Comment: Started Alc 24 y.o; pt is very vague; in her 45s and 46s, partied more often than now-getting drunk over the wkends--Vodka; since 89, getting drunk 2 x mo; past 2 years-1-1-1/2 shot of whiskey-mixed drink every day. DAVID--1/14/2018; No DUI or DT. Current Outpatient Medications   Medication Sig Dispense Refill    Multiple Vitamins-Minerals (OCUVITE EXTRA PO) Take by mouth      Denosumab (PROLIA SC) Inject into the skin      SIMVASTATIN PO Take by mouth      vitamin D (CHOLECALCIFEROL) 1000 UNIT TABS tablet Take 1,000 Units by mouth daily      ALPRAZolam (XANAX) 0.5 MG tablet Take 0.5 mg by mouth 3 times daily prn.  amLODIPine (NORVASC) 5 MG tablet Take 5 mg by mouth daily      CALCIUM PO one tab daily      ondansetron (ZOFRAN ODT) 4 MG disintegrating tablet Take 1 tablet by mouth every 8 hours as needed for Nausea 12 tablet 0     No current facility-administered medications for this visit. Allergies   Allergen Reactions    Ciprofloxacin Hives and Itching    Codeine Other (See Comments)    Lisinopril Other (See Comments)       Health Maintenance   Topic Date Due    Hepatitis C screen  1945    DTaP/Tdap/Td vaccine (1 - Tdap) 01/20/1964    Shingles Vaccine (1 of 2) 01/20/1995    Colon cancer screen colonoscopy  01/20/1995    Pneumococcal 65+ years Vaccine (1 of 2 - PCV13) 01/20/2010    Potassium monitoring  08/17/2019    Creatinine monitoring  08/17/2019    Flu vaccine (Season Ended) 09/01/2019    A1C test (Diabetic or Prediabetic)  04/24/2020    Breast cancer screen  07/12/2020    Lipid screen  04/24/2024    DEXA (modify frequency per FRAX score)  Completed       Subjective:      Review of Systems   Constitutional: Negative for activity change, appetite change, chills, fatigue and fever. HENT: Negative for congestion, ear pain, hearing loss, nosebleeds, sinus pressure, sinus pain and sneezing.     Eyes: Negative for visual disturbance. Respiratory: Negative for cough, shortness of breath and wheezing. Cardiovascular: Negative for chest pain and palpitations. Gastrointestinal: Negative for abdominal distention, abdominal pain, constipation, diarrhea, nausea and vomiting. Endocrine: Positive for polydipsia and polyuria. Negative for cold intolerance, heat intolerance and polyphagia. Genitourinary: Negative for difficulty urinating, menstrual problem, vaginal bleeding and vaginal discharge. Musculoskeletal: Negative for back pain and joint swelling. Skin: Negative for rash. Neurological: Positive for numbness. Negative for headaches. Psychiatric/Behavioral: Negative for sleep disturbance. The patient is not nervous/anxious. All other systems reviewed and are negative. Objective:     Physical Exam   Constitutional: She is oriented to person, place, and time. Vital signs are normal. She appears well-developed and well-nourished. She is active. No distress. HENT:   Head: Normocephalic and atraumatic. Right Ear: Hearing normal.   Left Ear: Hearing normal.   Mouth/Throat: Uvula is midline, oropharynx is clear and moist and mucous membranes are normal.   Eyes: Pupils are equal, round, and reactive to light. Conjunctivae are normal. No scleral icterus. Neck: Normal range of motion, full passive range of motion without pain and phonation normal. Neck supple. No JVD present. No thyroid mass and no thyromegaly present. Cardiovascular: Normal rate, regular rhythm, normal heart sounds and intact distal pulses. Exam reveals no decreased pulses. No murmur heard. Pulses:       Carotid pulses are 2+ on the right side, and 2+ on the left side. Radial pulses are 2+ on the right side, and 2+ on the left side. Pulmonary/Chest: Effort normal and breath sounds normal. No accessory muscle usage. No apnea. No respiratory distress. She has no wheezes. She has no rales. Abdominal: Soft.  Bowel sounds are normal. She exhibits no distension. There is no tenderness. Musculoskeletal: Normal range of motion. She exhibits no edema or deformity. Lymphadenopathy:     She has no cervical adenopathy. Neurological: She is alert and oriented to person, place, and time. She displays normal reflexes. Skin: Skin is warm and intact. Capillary refill takes less than 2 seconds. No rash noted. She is not diaphoretic. Psychiatric: She has a normal mood and affect. Her behavior is normal. Cognition and memory are normal.   Nursing note and vitals reviewed. /82   Pulse 92   Ht 4' 11\" (1.499 m)   Wt 147 lb 12.8 oz (67 kg)   SpO2 96%   BMI 29.85 kg/m²     Assessment:          1. Encounter to establish care with new doctor      2. Essential hypertension  Controlled    3. Neuropathy  - Hemoglobin A1C; Future  - Vitamin B12 & Folate; Future    4. MS (multiple sclerosis) ([facility]      5. Age related osteoporosis, unspecified pathological fracture presence      6. Overweight (BMI 25.0-29.9)      7. Mixed hyperlipidemia    - Lipid Panel; Future    8. Polydipsia    - Hemoglobin A1C; Future    9. Personal history of other endocrine, nutritional and metabolic disease     - Hemoglobin A1C; Future            Diagnosis Orders   1. Encounter to establish care with new doctor     2. Essential hypertension     3. Neuropathy  Hemoglobin A1C    Vitamin B12 & Folate   4. MS (multiple sclerosis) ([facility]     5. Age related osteoporosis, unspecified pathological fracture presence     6. Overweight (BMI 25.0-29.9)     7. Mixed hyperlipidemia  Lipid Panel   8. Polydipsia  Hemoglobin A1C   9. Personal history of other endocrine, nutritional and metabolic disease   Hemoglobin A1C           Plan:      Return in about 2 months (around 6/23/2019) for Routine follow-up.     Orders Placed This Encounter   Procedures    Hemoglobin A1C     Standing Status:   Future     Number of Occurrences:   1     Standing Expiration Date:   4/23/2020    Vitamin B12 & Folate Standing Status:   Future     Number of Occurrences:   1     Standing Expiration Date:   4/23/2020    Lipid Panel     Standing Status:   Future     Number of Occurrences:   1     Standing Expiration Date:   7/23/2019     Order Specific Question:   Is Patient Fasting?/# of Hours     Answer: Fast 8-10 hours     No orders of the defined types were placed in this encounter. Patient given educational materials - see patient instructions. Discussed use, benefit, and side effects of prescribedmedications. All patient questions answered. Pt voiced understanding. Reviewed health maintenance. Instructed to continue current medications, diet and exercise. Patient agreed with treatment plan. Follow up as directed. Of the given duration appointment visit, Dr. Arie Marroquin MD  spent at least 50% of the face-to-face time in counseling, explanation of diagnosis, planning of further management, and answering all questions. Electronically signed by Arie Marroquin MD on 4/28/2019 at 10:56 PM      Please note that this chart was generated using voice recognition Dragon dictation software. Although every effort was made to ensure the accuracy of this automatedtranscription, some errors in transcription may have occurred. no

## 2020-01-14 PROBLEM — K44.9 PARAESOPHAGEAL HERNIA: Status: ACTIVE | Noted: 2017-02-10

## 2020-01-14 PROBLEM — M75.00 DUPLAY'S PERIARTHRITIS SYNDROME: Status: ACTIVE | Noted: 2018-10-01

## 2020-01-14 PROBLEM — M81.0 AGE-RELATED OSTEOPOROSIS WITHOUT CURRENT PATHOLOGICAL FRACTURE: Status: ACTIVE | Noted: 2018-10-08

## 2020-01-14 PROBLEM — K21.9 GASTROESOPHAGEAL REFLUX DISEASE: Status: ACTIVE | Noted: 2018-10-01

## 2020-01-14 ASSESSMENT — ENCOUNTER SYMPTOMS
ABDOMINAL DISTENTION: 0
COUGH: 0
VOMITING: 0
NAUSEA: 0
SINUS PRESSURE: 0
SHORTNESS OF BREATH: 0
WHEEZING: 0
SINUS PAIN: 0
BACK PAIN: 0
ABDOMINAL PAIN: 0
DIARRHEA: 0
CONSTIPATION: 0

## 2020-01-15 NOTE — PROGRESS NOTES
12/05/2020    Creatinine monitoring  12/05/2020    Colon cancer screen colonoscopy  09/09/2026    DEXA (modify frequency per FRAX score)  Completed       Subjective:      Review of Systems   Constitutional: Negative for activity change, appetite change, chills, fatigue and fever. HENT: Negative for congestion, ear pain, hearing loss, nosebleeds, sinus pressure, sinus pain and sneezing. Eyes: Negative for visual disturbance. Respiratory: Negative for cough, shortness of breath and wheezing. Cardiovascular: Negative for chest pain and palpitations. Gastrointestinal: Negative for abdominal distention, abdominal pain, constipation, diarrhea, nausea and vomiting. Endocrine: Negative for cold intolerance, heat intolerance, polydipsia, polyphagia and polyuria. Genitourinary: Negative for difficulty urinating, menstrual problem, vaginal bleeding and vaginal discharge. Musculoskeletal: Positive for neck pain and neck stiffness. Negative for back pain and joint swelling. Skin: Negative for rash. Neurological: Negative for numbness and headaches. Psychiatric/Behavioral: Negative for sleep disturbance. The patient is not nervous/anxious. All other systems reviewed and are negative. Objective:     Physical Exam  Vitals signs and nursing note reviewed. Constitutional:       General: She is not in acute distress. Appearance: She is well-developed. She is not diaphoretic. HENT:      Head: Normocephalic and atraumatic. Right Ear: Hearing normal.      Left Ear: Hearing normal.      Mouth/Throat:      Pharynx: Uvula midline. Eyes:      General: No scleral icterus. Conjunctiva/sclera: Conjunctivae normal.      Pupils: Pupils are equal, round, and reactive to light. Neck:      Musculoskeletal: Full passive range of motion without pain, normal range of motion and neck supple. Thyroid: No thyroid mass or thyromegaly. Vascular: No JVD.       Trachea: Phonation normal. Cardiovascular:      Rate and Rhythm: Normal rate and regular rhythm. Pulses: No decreased pulses. Carotid pulses are 2+ on the right side and 2+ on the left side. Radial pulses are 2+ on the right side and 2+ on the left side. Heart sounds: Normal heart sounds. No murmur. Pulmonary:      Effort: Pulmonary effort is normal. No accessory muscle usage or respiratory distress. Breath sounds: Normal breath sounds. No wheezing or rales. Abdominal:      General: Bowel sounds are normal. There is no distension. Palpations: Abdomen is soft. Tenderness: There is no tenderness. Musculoskeletal: Normal range of motion. General: No deformity. Lymphadenopathy:      Cervical: No cervical adenopathy. Skin:     General: Skin is warm. Capillary Refill: Capillary refill takes less than 2 seconds. Findings: No rash. Neurological:      Mental Status: She is alert and oriented to person, place, and time. Deep Tendon Reflexes: Reflexes normal.   Psychiatric:         Behavior: Behavior normal.       /86 (Site: Left Upper Arm, Position: Sitting, Cuff Size: Medium Adult)   Pulse 58   Resp 18   Ht 4' 9\" (1.448 m)   Wt 140 lb (63.5 kg)   SpO2 90%   Breastfeeding? No   BMI 30.30 kg/m²     Assessment:          1. Essential hypertension  Well controlled    2. Cervical myelopathy (HCC)  F/u with NS; discussed about surgery and other conservative ways     3. Current mild episode of major depressive disorder, unspecified whether recurrent ([address]  controlled    4. Gastroesophageal reflux disease, esophagitis presence not specified  Well cntrolled            Diagnosis Orders   1. Essential hypertension     2. Cervical myelopathy (HCC)     3. Current mild episode of major depressive disorder, unspecified whether recurrent ([address]     4.  Gastroesophageal reflux disease, esophagitis presence not specified             Plan:      Return in about 3 months (around 4/8/2020) for wellness subseq. No orders of the defined types were placed in this encounter. No orders of the defined types were placed in this encounter. Patient given educational materials - see patient instructions. Discussed use, benefit, and side effects of prescribedmedications. All patient questions answered. Pt voiced understanding. Reviewed health maintenance. Instructed to continue current medications, diet and exercise. Patient agreed with treatment plan. Follow up as directed. I spent a total of 25 minutes face to face with this patient. Over 50% of that time was spent on counseling and care coordination. Please see assessment and plan for details. Electronically signed by Hanna Waters MD on 1/14/2020 at 7:09 PM      Please note that this chart was generated using voice recognition Dragon dictation software. Although every effort was made to ensure the accuracy of this automatedtranscription, some errors in transcription may have occurred.

## 2020-01-20 ENCOUNTER — OFFICE VISIT (OUTPATIENT)
Dept: PRIMARY CARE CLINIC | Age: 75
End: 2020-01-20
Payer: MEDICARE

## 2020-01-20 VITALS
HEART RATE: 88 BPM | OXYGEN SATURATION: 97 % | HEIGHT: 59 IN | SYSTOLIC BLOOD PRESSURE: 130 MMHG | RESPIRATION RATE: 15 BRPM | WEIGHT: 140 LBS | DIASTOLIC BLOOD PRESSURE: 88 MMHG | BODY MASS INDEX: 28.22 KG/M2

## 2020-01-20 PROCEDURE — 1036F TOBACCO NON-USER: CPT | Performed by: NURSE PRACTITIONER

## 2020-01-20 PROCEDURE — 3017F COLORECTAL CA SCREEN DOC REV: CPT | Performed by: NURSE PRACTITIONER

## 2020-01-20 PROCEDURE — G8417 CALC BMI ABV UP PARAM F/U: HCPCS | Performed by: NURSE PRACTITIONER

## 2020-01-20 PROCEDURE — G8427 DOCREV CUR MEDS BY ELIG CLIN: HCPCS | Performed by: NURSE PRACTITIONER

## 2020-01-20 PROCEDURE — 1090F PRES/ABSN URINE INCON ASSESS: CPT | Performed by: NURSE PRACTITIONER

## 2020-01-20 PROCEDURE — G8399 PT W/DXA RESULTS DOCUMENT: HCPCS | Performed by: NURSE PRACTITIONER

## 2020-01-20 PROCEDURE — 1123F ACP DISCUSS/DSCN MKR DOCD: CPT | Performed by: NURSE PRACTITIONER

## 2020-01-20 PROCEDURE — 4040F PNEUMOC VAC/ADMIN/RCVD: CPT | Performed by: NURSE PRACTITIONER

## 2020-01-20 PROCEDURE — 99214 OFFICE O/P EST MOD 30 MIN: CPT | Performed by: NURSE PRACTITIONER

## 2020-01-20 PROCEDURE — G8484 FLU IMMUNIZE NO ADMIN: HCPCS | Performed by: NURSE PRACTITIONER

## 2020-01-20 RX ORDER — MELOXICAM 15 MG/1
15 TABLET ORAL DAILY
Qty: 30 TABLET | Refills: 0 | Status: SHIPPED | OUTPATIENT
Start: 2020-01-20 | End: 2020-06-04 | Stop reason: ALTCHOICE

## 2020-01-20 RX ORDER — POLYETHYLENE GLYCOL 3350 17 G/17G
17 POWDER, FOR SOLUTION ORAL 2 TIMES DAILY
Qty: 1020 G | Refills: 0 | Status: SHIPPED | OUTPATIENT
Start: 2020-01-20 | End: 2020-02-19

## 2020-01-20 ASSESSMENT — ENCOUNTER SYMPTOMS
SORE THROAT: 0
ABDOMINAL PAIN: 0
NAUSEA: 0
CONSTIPATION: 1
RHINORRHEA: 0
CHEST TIGHTNESS: 0
SHORTNESS OF BREATH: 0
VOICE CHANGE: 0
BLOOD IN STOOL: 0
COLOR CHANGE: 0
DIARRHEA: 0
VOMITING: 0
ABDOMINAL DISTENTION: 0

## 2020-01-20 NOTE — PROGRESS NOTES
[address] PRIMARY CARE  [address] [facility] [place] [facility] [address]  Dept: [phone]  Dept Fax: [phone]    Jose Ariza is a 76 y.o. female who presentstoday for her medical conditions/complaints as noted below. Jose Ariza is c/o of  Chief Complaint   Patient presents with    Arm Pain     lt arm with weakness and tingling down to fingers; no meds used          HPI:     Here with complaint of left arm pain x 1 week with some intermittent numbness and tingling, has cervical spine stenosis with myelopathy, is concerned for fracture to left shoulder because she has fallen multiple times, last fall 3 months ago  Has not tried any medications for pain  Negative for speech disturbance or weakness     Follows with neurologist at [facility], was directed to stop medication for MS, symptoms have been stable  Would like second neurosurgeon opinion to decide between conservative medical management or surgical intervention  Has complaint of dizziness x 1 month, has used antivert on occasion with some relief, has hx vertigo but feels this is worse than that    Has been incontinent of stool in last 2 weeks approximately 10 times, reports that small grape size stool comes out involuntarily, she does admit to being constipated lately and having to strain for stool, no incontinence of urine    Shoulder Pain    The pain is present in the left arm and left shoulder. This is a new problem. The current episode started in the past 7 days. There has been no history of extremity trauma. The problem occurs daily. The problem has been unchanged. The pain is at a severity of 6/10. The pain is moderate. Associated symptoms include numbness (Left arm numbness and tingling) and tingling. Pertinent negatives include no fever, joint swelling or limited range of motion. She has tried nothing for the symptoms. Her past medical history is significant for osteoarthritis.  There is no history of diabetes. Hemoglobin A1C (%)   Date Value   04/24/2019 5.7             ( goal A1C is < 7)   No results found for: LABMICR  LDL Cholesterol (mg/dL)   Date Value   04/24/2019 86       (goal LDL is <100)   AST (U/L)   Date Value   08/03/2018 15     ALT (U/L)   Date Value   08/03/2018 14     BUN (mg/dL)   Date Value   12/05/2019 16     BP Readings from Last 3 Encounters:   01/20/20 130/88   01/08/20 124/86   12/23/19 131/78          ([id number])    Past Medical History:   Diagnosis Date    Anxiety     Arthritis     Cataracts, bilateral     Depression     Diabetes mellitus ([facility]     pre diabetic    Hearing loss     Hyperlipidemia     Hypertension     MS (multiple sclerosis) ([facility]     Osteoarthritis     Osteoporosis       Past Surgical History:   Procedure Laterality Date    EYE SURGERY      x3    HERNIA REPAIR  05/2017    ingial    KNEE SURGERY      menicus right 2007    SHOULDER SURGERY      right rotator cuff 2016       Family History   Adopted: Yes       Social History     Tobacco Use    Smoking status: Never Smoker    Smokeless tobacco: Never Used   Substance Use Topics    Alcohol use: Yes     Alcohol/week: 1.0 standard drinks     Types: 1 Shots of liquor per week     Frequency: 2-3 times a week     Drinks per session: 1 or 2     Comment: Started Alc 24 y.o; pt is very vague; in her 45s and 46s, partied more often than now-getting drunk over the wkends--Vodka; since 89, getting drunk 2 x mo; past 2 years-1-1-1/2 shot of whiskey-mixed drink every day. DAVID--1/14/2018; No DUI or DT.       Current Outpatient Medications   Medication Sig Dispense Refill    polyethylene glycol (GLYCOLAX) powder Take 17 g by mouth 2 times daily 1020 g 0    meloxicam (MOBIC) 15 MG tablet Take 1 tablet by mouth daily 30 tablet 0    KLOR-CON M10 10 MEQ extended release tablet TAKE 1 TABLET BY MOUTH EVERY DAY 30 tablet 0    meclizine (ANTIVERT) 25 MG tablet Take 1 po tid PRN 60 tablet 1    atorvastatin Genitourinary: Negative for difficulty urinating. Musculoskeletal: Positive for neck pain. Negative for arthralgias and myalgias. Skin: Negative for color change. Neurological: Positive for dizziness, tingling and numbness (Left arm numbness and tingling). Negative for weakness and headaches. Psychiatric/Behavioral: Negative for behavioral problems. The patient is not nervous/anxious. Objective:   /88   Pulse 88   Resp 15   Ht 4' 11\" (1.499 m)   Wt 140 lb (63.5 kg)   SpO2 97%   Breastfeeding No   BMI 28.28 kg/m²   Physical Exam  Vitals signs reviewed. Constitutional:       General: She is not in acute distress. Appearance: Normal appearance. HENT:      Head: Normocephalic. Eyes:      Pupils: Pupils are equal, round, and reactive to light. Neck:      Musculoskeletal: Neck supple. Cardiovascular:      Rate and Rhythm: Normal rate and regular rhythm. Heart sounds: Normal heart sounds. Pulmonary:      Effort: Pulmonary effort is normal.      Breath sounds: Normal breath sounds. Abdominal:      General: Bowel sounds are normal.   Musculoskeletal:         General: Tenderness (left deltoid TTP) present. Left shoulder: She exhibits tenderness. She exhibits normal range of motion, no swelling, no effusion, no crepitus and normal strength. Comments: No decreased strength to BLE and BUE   Skin:     General: Skin is warm and dry. Neurological:      General: No focal deficit present. Mental Status: She is alert and oriented to person, place, and time. Psychiatric:         Mood and Affect: Mood normal.         Behavior: Behavior normal.           :       Diagnosis Orders   1. Left arm pain  XR SHOULDER LEFT (MIN 2 VIEWS)   2. Cervical myelopathy (HCC)  External Referral To Neurosurgery    meloxicam (MOBIC) 15 MG tablet   3. Constipation, unspecified constipation type  polyethylene glycol (GLYCOLAX) powder             :          1.  Left arm pain  Worsening

## 2020-01-22 ENCOUNTER — HOSPITAL ENCOUNTER (OUTPATIENT)
Dept: GENERAL RADIOLOGY | Age: 75
Discharge: HOME | End: 2020-01-24
Payer: MEDICARE

## 2020-01-22 ENCOUNTER — HOSPITAL ENCOUNTER (OUTPATIENT)
Age: 75
Discharge: HOME | End: 2020-01-24
Payer: MEDICARE

## 2020-01-22 PROCEDURE — 73030 X-RAY EXAM OF SHOULDER: CPT

## 2020-01-23 ENCOUNTER — OFFICE VISIT (OUTPATIENT)
Dept: NEUROLOGY | Age: 75
End: 2020-01-23
Payer: MEDICARE

## 2020-01-23 ENCOUNTER — TELEPHONE (OUTPATIENT)
Dept: PRIMARY CARE CLINIC | Age: 75
End: 2020-01-23

## 2020-01-23 VITALS
BODY MASS INDEX: 28.43 KG/M2 | HEART RATE: 78 BPM | HEIGHT: 59 IN | DIASTOLIC BLOOD PRESSURE: 82 MMHG | WEIGHT: 141 LBS | SYSTOLIC BLOOD PRESSURE: 127 MMHG

## 2020-01-23 DIAGNOSIS — M25.512 ACUTE PAIN OF LEFT SHOULDER: Primary | ICD-10-CM

## 2020-01-23 PROCEDURE — 4040F PNEUMOC VAC/ADMIN/RCVD: CPT | Performed by: PSYCHIATRY & NEUROLOGY

## 2020-01-23 PROCEDURE — G8427 DOCREV CUR MEDS BY ELIG CLIN: HCPCS | Performed by: PSYCHIATRY & NEUROLOGY

## 2020-01-23 PROCEDURE — 3017F COLORECTAL CA SCREEN DOC REV: CPT | Performed by: PSYCHIATRY & NEUROLOGY

## 2020-01-23 PROCEDURE — 1036F TOBACCO NON-USER: CPT | Performed by: PSYCHIATRY & NEUROLOGY

## 2020-01-23 PROCEDURE — 99214 OFFICE O/P EST MOD 30 MIN: CPT | Performed by: PSYCHIATRY & NEUROLOGY

## 2020-01-23 PROCEDURE — G8484 FLU IMMUNIZE NO ADMIN: HCPCS | Performed by: PSYCHIATRY & NEUROLOGY

## 2020-01-23 PROCEDURE — 1090F PRES/ABSN URINE INCON ASSESS: CPT | Performed by: PSYCHIATRY & NEUROLOGY

## 2020-01-23 PROCEDURE — 1123F ACP DISCUSS/DSCN MKR DOCD: CPT | Performed by: PSYCHIATRY & NEUROLOGY

## 2020-01-23 PROCEDURE — G8399 PT W/DXA RESULTS DOCUMENT: HCPCS | Performed by: PSYCHIATRY & NEUROLOGY

## 2020-01-23 PROCEDURE — G8417 CALC BMI ABV UP PARAM F/U: HCPCS | Performed by: PSYCHIATRY & NEUROLOGY

## 2020-01-23 ASSESSMENT — ENCOUNTER SYMPTOMS
BACK PAIN: 1
EYES NEGATIVE: 1
RESPIRATORY NEGATIVE: 1
ALLERGIC/IMMUNOLOGIC NEGATIVE: 1
CONSTIPATION: 1

## 2020-01-23 NOTE — PROGRESS NOTES
Subjective:      Patient ID: Lauren Marx is a 76 y.o. female. 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 There is also postural instability which is likely coming from neck . She does have multiple sclerosis that has been felt not to be active. She has memory loss with pending neuropsychological testing memory complaints that appear to be in relation to multiple sclerosis along with cojoint depression. The condition is she saw neurosurgery with Dr Nyasia Carvajal recommending decompressive cervical surgery . She has been hesitant wanting second opinion . She retains imbalance of gait having had a fall yesterday after tripping having had 2 falls sine December . There is pain in left shoulder and left upper arm with neck stiffness placed mobic . She has second opinion neurosurgical opinion pending with Dr Олег Rolon . There is baseline lightheadedness with positional vertigo on layingsupine in bed . Memory problem continues with some forgetfulness . There is no hearing loss or tinnitus . She has multiple sclerosis having been followed by [facility] used to be on avonex changed to copaxone having been off medication for 10 years with feeling that disease was not active . She reports that she was diagnosed in 2003 having at the time imbalance with abnormal MRI of Head and cervical spine having lumbar puncture which was negative  . There were exacerbations with trouble walking getting intermittent IV solumedrol 3 times the last time being over 10 years . She reports that she retains some postural instability on her feet with three falls over the last year. She reports he has trouble remembering names such as people she should know who she has worked with and forgetting where she puts things. She may have trouble recalling conversations repeating herself  .  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 She has depresion with low mood having been on pamelor in the past being tearful . She has been on zoloft and wellbutrin in the past having seizure event on the latter medication. There is urinary frequency and urgency . Testing Head CT normal , March 2015 . MRI of Head bilateral extensive  periventricular subcortical white mater disease with no enhancement . MRI cervical spine with severe spinal narrowing C5-6 with early myelomalacia. EEG normal  . Treponema pallidum nonreactive , [id number] , folic acid 54.0, TSH normal , OPAL negative      Past Medical History:   Diagnosis Date    Anxiety     Arthritis     Cataracts, bilateral     Depression     Diabetes mellitus ([facility]     pre diabetic    Hearing loss     Hyperlipidemia     Hypertension     MS (multiple sclerosis) ([facility]     Osteoarthritis     Osteoporosis        Past Surgical History:   Procedure Laterality Date    EYE SURGERY      x3    HERNIA REPAIR  05/2017    ingial    KNEE SURGERY      menicus right 2007    SHOULDER SURGERY      right rotator cuff 2016       Family History   Adopted: Yes       Social History     Socioeconomic History    Marital status:      Spouse name: None    Number of children: None    Years of education: None    Highest education level: None   Occupational History    None   Social Needs    Financial resource strain: None    Food insecurity:     Worry: None     Inability: None    Transportation needs:     Medical: None     Non-medical: None   Tobacco Use    Smoking status: Never Smoker    Smokeless tobacco: Never Used   Substance and Sexual Activity    Alcohol use:  Yes     Alcohol/week: 1.0 standard drinks     Types: 1 Shots of liquor per week     Frequency: 2-3 times a week     Drinks per session: 1 or 2     Comment: Started Alc 21 y.o; pt is very vague; in her 45s and 46s, partied more often than now-getting drunk over the wkends--Vodka; since 89, getting drunk 2 x mo; past 2 years-1-1-1/2 shot of whiskey-mixed drink every day. DAVID--1/14/2018; No DUI or GARRETT Casey Drug use: No    Sexual activity: Yes     Partners: Male   Lifestyle    Physical activity:     Days per week: None     Minutes per session: None    Stress: None   Relationships    Social connections:     Talks on phone: None     Gets together: None     Attends Yazidi service: None     Active member of club or organization: None     Attends meetings of clubs or organizations: None     Relationship status: None    Intimate partner violence:     Fear of current or ex partner: None     Emotionally abused: None     Physically abused: None     Forced sexual activity: None   Other Topics Concern    None   Social History Narrative    None       Current Outpatient Medications   Medication Sig Dispense Refill    polyethylene glycol (GLYCOLAX) powder Take 17 g by mouth 2 times daily 1020 g 0    meloxicam (MOBIC) 15 MG tablet Take 1 tablet by mouth daily 30 tablet 0    meclizine (ANTIVERT) 25 MG tablet Take 1 po tid PRN (Patient taking differently: daily ) 60 tablet 1    atorvastatin (LIPITOR) 10 MG tablet Take 10 mg by mouth daily      citalopram (CELEXA) 20 MG tablet Take 1 tablet by mouth daily (Patient taking differently: Take 10 mg by mouth daily ) 30 tablet 3    ondansetron (ZOFRAN ODT) 4 MG disintegrating tablet Take 1 tablet by mouth every 8 hours as needed for Nausea 12 tablet 0    amLODIPine (NORVASC) 5 MG tablet Take 1 tablet by mouth daily 30 tablet 5    Denosumab (PROLIA SC) Inject into the skin      ALPRAZolam (XANAX) 0.5 MG tablet Take 0.5 mg by mouth 3 times daily prn.       CALCIUM PO one tab daily      KLOR-CON M10 10 MEQ extended release tablet TAKE 1 TABLET BY MOUTH EVERY DAY (Patient not taking: Reported on 1/23/2020) 30 tablet 0    SIMVASTATIN PO Take by mouth      vitamin D (CHOLECALCIFEROL) 1000 UNIT TABS tablet Take 1,000 Units by mouth daily       No current facility-administered medications for this visit. Allergies   Allergen Reactions    Ciprofloxacin Hives and Itching    Lisinopril Other (See Comments)    Codeine Other (See Comments)       Review of Systems   Constitutional: Positive for fatigue. HENT: Negative. Eyes: Negative. Respiratory: Negative. Cardiovascular: Negative. Gastrointestinal: Positive for constipation. Endocrine: Negative. Genitourinary: Positive for frequency. Musculoskeletal: Positive for arthralgias, back pain, gait problem, myalgias and neck pain. Skin: Negative. Allergic/Immunologic: Negative. Neurological: Positive for dizziness and weakness. Hematological: Negative. Psychiatric/Behavioral: Positive for dysphoric mood. The patient is nervous/anxious. Objective:   Physical Exam    Vitals:    01/23/20 0837   BP: 127/82   Pulse: 78     weight: 141 lb (64 kg)    Neurological Examination  Constitutional .General exam well groomed   Head/Ears /Nose/Throat: external ear . Normal exam  Neck and thyroid . Normal size. No bruits  Respiratory . Breathsounds clear bilaterally  Cardiovascular: Auscultation of heart with regular rate and rhythm  Musculoskeletal. Muscle bulk and tone normal                                                           Muscle strength iliopsoas 4+/5 otherwise 5/5 strength throughout                                                                                No dysmetria or dysdiadokinesis  No tremor   Normal fine motor  Gait imbalance   Orientation Alert and oriented x 3 . WORLD able to spell forwards not backwards . Serial 7 to 86   Attention and concentration normal  Short term memory 3 words out of 3 in one minute   Language process and speech normal . No aphasia   Cranial nerve 2 normal acuety and visual fields  Cranial nerve 3, 4 and 6 . Extraocular muscles are intact .  Pupils are equal and reactive   Cranial nerve 5 . Normal strength of masseter and temporalis . Intact corneal reflex. Normal facial sensation  Cranial nerve 7 normal exam   Cranial nerve 8. Grossly intact hearing   Cranial nerve 9 and 10. Symmetric palate elevation   Cranial nerve 11 , 5 out of 5 strength   Cranial Nerve 12 midline tongue . No atrophy  Sensation . Normal proprioception . Intact Vibration . Normal pinprick and light touch   Deep Tendon Reflexes normal  Plantar response flexor bilaterally    Assessment:       Diagnosis Orders   1. Cervical myelopathy ([address]     2. Multiple sclerosis ([address]     3. Dizziness     4. Gait instability     Patient has second neurosurgical opinion pending with Dr Osuna Plan recommending surgical decompression . She needs to use cane with baseline imbalance . There does appear to be some peripheral vestibular component to dizziness  .  She does have multiple sclerosis that has been felt not to be active        Plan:      As above         Ayo Esqueda MD

## 2020-01-24 NOTE — COMMUNICATION BODY
Subjective:      Patient ID: Minnie Richmond is a 76 y.o. female. 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 There is also postural instability which is likely coming from neck . She does have multiple sclerosis that has been felt not to be active. She has memory loss with pending neuropsychological testing memory complaints that appear to be in relation to multiple sclerosis along with cojoint depression. The condition is she saw neurosurgery with Dr Matti Hernandez recommending decompressive cervical surgery . She has been hesitant wanting second opinion . She retains imbalance of gait having had a fall yesterday after tripping having had 2 falls sine December . There is pain in left shoulder and left upper arm with neck stiffness placed mobic . She has second opinion neurosurgical opinion pending with Dr Erasmo Khan . There is baseline lightheadedness with positional vertigo on layingsupine in bed . Memory problem continues with some forgetfulness . There is no hearing loss or tinnitus . She has multiple sclerosis having been followed by [facility] used to be on avonex changed to copaxone having been off medication for 10 years with feeling that disease was not active . She reports that she was diagnosed in 2003 having at the time imbalance with abnormal MRI of Head and cervical spine having lumbar puncture which was negative  . There were exacerbations with trouble walking getting intermittent IV solumedrol 3 times the last time being over 10 years . She reports that she retains some postural instability on her feet with three falls over the last year. She reports he has trouble remembering names such as people she should know who she has worked with and forgetting where she puts things. She may have trouble recalling conversations repeating herself  .  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 She has depresion with low mood having been on pamelor in the past being tearful . She has been on zoloft and wellbutrin in the past having seizure event on the latter medication. There is urinary frequency and urgency . Testing Head CT normal , March 2015 . MRI of Head bilateral extensive  periventricular subcortical white mater disease with no enhancement . MRI cervical spine with severe spinal narrowing C5-6 with early myelomalacia. EEG normal  . Treponema pallidum nonreactive , Q98 016 , folic acid 47.5, TSH normal , OPAL negative      Past Medical History:   Diagnosis Date    Anxiety     Arthritis     Cataracts, bilateral     Depression     Diabetes [facility]     pre diabetic    Hearing loss     Hyperlipidemia     Hypertension     MS (multiple sclerosis) ([facility]     Osteoarthritis     Osteoporosis        Past Surgical History:   Procedure Laterality Date    EYE SURGERY      x3    HERNIA REPAIR  05/2017    ingial    KNEE SURGERY      menicus right 2007    SHOULDER SURGERY      right rotator cuff 2016       Family History   Adopted: Yes       Social History     Socioeconomic History    Marital status:      Spouse name: None    Number of children: None    Years of education: None    Highest education level: None   Occupational History    None   Social Needs    Financial resource strain: None    Food insecurity:     Worry: None     Inability: None    Transportation needs:     Medical: None     Non-medical: None   Tobacco Use    Smoking status: Never Smoker    Smokeless tobacco: Never Used   Substance and Sexual Activity    Alcohol use:  Yes     Alcohol/week: 1.0 standard drinks     Types: 1 Shots of liquor per week     Frequency: 2-3 times a week     Drinks per session: 1 or 2     Comment: Started Alc 21 y.o; pt is very vague; in her 45s and 46s, partied more often than now-getting drunk over the wkends--Vodka; since 89, getting drunk 2 x mo; past 2 years-1-1-1/2 shot of whiskey-mixed drink every day. DAVID--1/14/2018; No DUI or DT.   [facility] Drug use: No    Sexual activity: Yes     Partners: Male   Lifestyle    Physical activity:     Days per week: None     Minutes per session: None    Stress: None   Relationships    Social connections:     Talks on phone: None     Gets together: None     Attends [employer] service: None     Active member of club or organization: None     Attends meetings of clubs or organizations: None     Relationship status: None    Intimate partner violence:     Fear of current or ex partner: None     Emotionally abused: None     Physically abused: None     Forced sexual activity: None   Other Topics Concern    None   Social History Narrative    None       Current Outpatient Medications   Medication Sig Dispense Refill    polyethylene glycol (GLYCOLAX) powder Take 17 g by mouth 2 times daily 1020 g 0    meloxicam (MOBIC) 15 MG tablet Take 1 tablet by mouth daily 30 tablet 0    meclizine (ANTIVERT) 25 MG tablet Take 1 po tid PRN (Patient taking differently: daily ) 60 tablet 1    atorvastatin (LIPITOR) 10 MG tablet Take 10 mg by mouth daily      citalopram (CELEXA) 20 MG tablet Take 1 tablet by mouth daily (Patient taking differently: Take 10 mg by mouth daily ) 30 tablet 3    ondansetron (ZOFRAN ODT) 4 MG disintegrating tablet Take 1 tablet by mouth every 8 hours as needed for Nausea 12 tablet 0    amLODIPine (NORVASC) 5 MG tablet Take 1 tablet by mouth daily 30 tablet 5    Denosumab (PROLIA SC) Inject into the skin      ALPRAZolam (XANAX) 0.5 MG tablet Take 0.5 mg by mouth 3 times daily prn.       CALCIUM PO one tab daily      KLOR-CON M10 10 MEQ extended release tablet TAKE 1 TABLET BY MOUTH EVERY DAY (Patient not taking: Reported on 1/23/2020) 30 tablet 0    SIMVASTATIN PO Take by mouth      vitamin D and reactive   Cranial nerve 5 . Normal strength of masseter and temporalis . Intact corneal reflex. Normal facial sensation  Cranial nerve 7 normal exam   Cranial nerve 8. Grossly intact hearing   Cranial nerve 9 and 10. Symmetric palate elevation   Cranial nerve 11 , 5 out of 5 strength   Cranial Nerve 12 midline tongue . No atrophy  Sensation . Normal proprioception . Intact Vibration . Normal pinprick and light touch   Deep Tendon Reflexes normal  Plantar response flexor bilaterally    Assessment:       Diagnosis Orders   1. Cervical myelopathy ([address]     2. Multiple sclerosis ([address]     3. Dizziness     4. Gait instability     Patient has second neurosurgical opinion pending with Dr Michelle Campos recommending surgical decompression . She needs to use cane with baseline imbalance . There does appear to be some peripheral vestibular component to dizziness  .  She does have multiple sclerosis that has been felt not to be active        Plan:      As above         Suzanne Mello MD

## 2020-02-04 ENCOUNTER — OFFICE VISIT (OUTPATIENT)
Dept: ORTHOPEDIC SURGERY | Age: 75
End: 2020-02-04
Payer: MEDICARE

## 2020-02-04 VITALS — BODY MASS INDEX: 28.63 KG/M2 | WEIGHT: 142 LBS | HEIGHT: 59 IN

## 2020-02-04 PROCEDURE — 3017F COLORECTAL CA SCREEN DOC REV: CPT | Performed by: ORTHOPAEDIC SURGERY

## 2020-02-04 PROCEDURE — 1036F TOBACCO NON-USER: CPT | Performed by: ORTHOPAEDIC SURGERY

## 2020-02-04 PROCEDURE — 1090F PRES/ABSN URINE INCON ASSESS: CPT | Performed by: ORTHOPAEDIC SURGERY

## 2020-02-04 PROCEDURE — 4040F PNEUMOC VAC/ADMIN/RCVD: CPT | Performed by: ORTHOPAEDIC SURGERY

## 2020-02-04 PROCEDURE — G8417 CALC BMI ABV UP PARAM F/U: HCPCS | Performed by: ORTHOPAEDIC SURGERY

## 2020-02-04 PROCEDURE — G8484 FLU IMMUNIZE NO ADMIN: HCPCS | Performed by: ORTHOPAEDIC SURGERY

## 2020-02-04 PROCEDURE — G8427 DOCREV CUR MEDS BY ELIG CLIN: HCPCS | Performed by: ORTHOPAEDIC SURGERY

## 2020-02-04 PROCEDURE — 99203 OFFICE O/P NEW LOW 30 MIN: CPT | Performed by: ORTHOPAEDIC SURGERY

## 2020-02-04 PROCEDURE — G8399 PT W/DXA RESULTS DOCUMENT: HCPCS | Performed by: ORTHOPAEDIC SURGERY

## 2020-02-04 PROCEDURE — 20610 DRAIN/INJ JOINT/BURSA W/O US: CPT | Performed by: ORTHOPAEDIC SURGERY

## 2020-02-04 PROCEDURE — 1123F ACP DISCUSS/DSCN MKR DOCD: CPT | Performed by: ORTHOPAEDIC SURGERY

## 2020-02-04 RX ORDER — DICLOFENAC SODIUM 75 MG/1
75 TABLET, DELAYED RELEASE ORAL 2 TIMES DAILY WITH MEALS
Qty: 28 TABLET | Refills: 0 | Status: SHIPPED | OUTPATIENT
Start: 2020-02-04 | End: 2020-06-04 | Stop reason: ALTCHOICE

## 2020-02-04 NOTE — PROGRESS NOTES
Orthopedic Shoulder Encounter Note     Chief complaint: Left shoulder pain    HPI: Brandee Ott is a 76 y.o.  right-hand dominant female who presents for evaluation of her left shoulder that is been hurting for about a month and a half now. She denies any precipitating trauma or injury. Her pain initially started over the lateral aspect of her upper arm but now is deep in the shoulder. She essentially has pain anytime she reaches upwards or with simple daily activities. She also has intermittent nighttime pain as well. She cannot recall a specific injury but does report that she is fallen multiple times. Of note she has a history of multiple sclerosis. She has associated weakness with her pain as well as some stiffness. Previous treatment:    NSAIDs: Advil    Physical Therapy:  No    Injections: None    Surgeries: None    Review of Systems:     Constitution: no fever or chills   Pain level: 5/10  Musculoskeletal: As noted in the HPI   Neurologic: no neurologic symptoms    Past Medical History  Roland Whitney  has a past medical history of Anxiety, Arthritis, Cataracts, bilateral, Depression, Diabetes mellitus ([address] Hearing loss, Hyperlipidemia, Hypertension, MS (multiple sclerosis) ([address] Osteoarthritis, and Osteoporosis. Past Surgical History  Roland Whitney  has a past surgical history that includes knee surgery; shoulder surgery; hernia repair (05/2017); and eye surgery.     Current Medications  Current Outpatient Medications   Medication Sig Dispense Refill    diclofenac (VOLTAREN) 75 MG EC tablet Take 1 tablet by mouth 2 times daily (with meals) for 14 days 28 tablet 0    polyethylene glycol (GLYCOLAX) powder Take 17 g by mouth 2 times daily 1020 g 0    meloxicam (MOBIC) 15 MG tablet Take 1 tablet by mouth daily 30 tablet 0    KLOR-CON M10 10 MEQ extended release tablet TAKE 1 TABLET BY MOUTH EVERY DAY (Patient not taking: Reported on 1/23/2020) 30 tablet 0    meclizine (ANTIVERT) 25 MG tablet Take 1 po tid PRN (Patient taking differently: daily ) 60 tablet 1    atorvastatin (LIPITOR) 10 MG tablet Take 10 mg by mouth daily      citalopram (CELEXA) 20 MG tablet Take 1 tablet by mouth daily (Patient taking differently: Take 10 mg by mouth daily ) 30 tablet 3    ondansetron (ZOFRAN ODT) 4 MG disintegrating tablet Take 1 tablet by mouth every 8 hours as needed for Nausea 12 tablet 0    amLODIPine (NORVASC) 5 MG tablet Take 1 tablet by mouth daily 30 tablet 5    Denosumab (PROLIA SC) Inject into the skin      SIMVASTATIN PO Take by mouth      vitamin D (CHOLECALCIFEROL) 1000 UNIT TABS tablet Take 1,000 Units by mouth daily      ALPRAZolam (XANAX) 0.5 MG tablet Take 0.5 mg by mouth 3 times daily prn.  CALCIUM PO one tab daily       No current facility-administered medications for this visit. Allergies  Allergies have been reviewed. Lizzy Harp is allergic to ciprofloxacin; lisinopril; and codeine. Social History  Lizzy Harp  reports that she has never smoked. She has never used smokeless tobacco. She reports current alcohol use of about 1.0 standard drinks of alcohol per week. She reports that she does not use drugs. Family History  Mary's family history is not on file. She was adopted.      Physical Exam:     Ht 4' 11\" (1.499 m)   Wt 142 lb (64.4 kg)   BMI 28.68 kg/m²    General Appearance: alert, well appearing, and in no distress  Mental Status: alert, oriented to person, place, and time  Gait: normal    Shoulder:    Skin: warm and dry, no rash or erythema; no swelling or obvious muscular atrophy  Vasculature: 2+ radial pulses bilaterally  Neuro: Sensation grossly intact to light touch diffusely  Tenderness: Tender to palpation over the anterolateral corner of the left shoulder    ROM: (Degrees)    Right   A P   Left   A P    Elevation  135    Elevation  135 145  Abduction  155    Abduction  140  150  ER   60    ER   70 85  IR   L3    IR   L3   90 abd/ER      90 abd/ER     90 abd/IR      90 space injection  Following an appropriate discussion with the patient regarding the risks and benefits of the procedure she consented to proceed. her left shoulder was prepped using chlorhexadine solution. Using aseptic technique and through a posterior approach, her left shoulder subacromial space was injected with a 4 cc mixture of 1cc 40mg/ml kenalog and 3 cc of 1% lidocaine without epinephrine. A band aid was applied to the injection site. she tolerated the injection with no immediate adverse reactions.               NA = Not assessed  RTC = Rotator cuff  RCT = Rotator cuff tear  ER = External rotation  IR = Internal rotation  AC = Acromioclavicular  GH = Glenohumeral  n = No  y = Yes

## 2020-02-13 RX ORDER — TRIAMCINOLONE ACETONIDE 40 MG/ML
40 INJECTION, SUSPENSION INTRA-ARTICULAR; INTRAMUSCULAR ONCE
Status: COMPLETED | OUTPATIENT
Start: 2020-02-13 | End: 2020-02-13

## 2020-02-13 RX ORDER — LIDOCAINE HYDROCHLORIDE 10 MG/ML
5 INJECTION, SOLUTION INFILTRATION; PERINEURAL ONCE
Status: COMPLETED | OUTPATIENT
Start: 2020-02-13 | End: 2020-02-13

## 2020-02-13 RX ADMIN — LIDOCAINE HYDROCHLORIDE 5 ML: 10 INJECTION, SOLUTION INFILTRATION; PERINEURAL at 13:58

## 2020-02-13 RX ADMIN — TRIAMCINOLONE ACETONIDE 40 MG: 40 INJECTION, SUSPENSION INTRA-ARTICULAR; INTRAMUSCULAR at 13:58

## 2020-03-09 NOTE — TELEPHONE ENCOUNTER
Last Rx given on 11/12/19 for a 4 month supply. There is docummenation that the patient is using only 10 mg qd. She has a follow up with Dr. Clair Kelly on 3/18/20. I placed a call to Jennifer Al this morning and left a message on her voicemail asking for a return call to clarify this.

## 2020-03-10 RX ORDER — CITALOPRAM 20 MG/1
TABLET ORAL
Qty: 90 TABLET | Refills: 1 | OUTPATIENT
Start: 2020-03-10

## 2020-03-12 ENCOUNTER — OFFICE VISIT (OUTPATIENT)
Dept: NEUROLOGY | Age: 75
End: 2020-03-12
Payer: MEDICARE

## 2020-03-12 VITALS
DIASTOLIC BLOOD PRESSURE: 80 MMHG | BODY MASS INDEX: 28.63 KG/M2 | HEIGHT: 59 IN | HEART RATE: 71 BPM | SYSTOLIC BLOOD PRESSURE: 130 MMHG | WEIGHT: 142 LBS

## 2020-03-12 PROCEDURE — G8427 DOCREV CUR MEDS BY ELIG CLIN: HCPCS | Performed by: PSYCHIATRY & NEUROLOGY

## 2020-03-12 PROCEDURE — G8399 PT W/DXA RESULTS DOCUMENT: HCPCS | Performed by: PSYCHIATRY & NEUROLOGY

## 2020-03-12 PROCEDURE — 1090F PRES/ABSN URINE INCON ASSESS: CPT | Performed by: PSYCHIATRY & NEUROLOGY

## 2020-03-12 PROCEDURE — 3017F COLORECTAL CA SCREEN DOC REV: CPT | Performed by: PSYCHIATRY & NEUROLOGY

## 2020-03-12 PROCEDURE — 1123F ACP DISCUSS/DSCN MKR DOCD: CPT | Performed by: PSYCHIATRY & NEUROLOGY

## 2020-03-12 PROCEDURE — G8484 FLU IMMUNIZE NO ADMIN: HCPCS | Performed by: PSYCHIATRY & NEUROLOGY

## 2020-03-12 PROCEDURE — G8417 CALC BMI ABV UP PARAM F/U: HCPCS | Performed by: PSYCHIATRY & NEUROLOGY

## 2020-03-12 PROCEDURE — 4040F PNEUMOC VAC/ADMIN/RCVD: CPT | Performed by: PSYCHIATRY & NEUROLOGY

## 2020-03-12 PROCEDURE — 1036F TOBACCO NON-USER: CPT | Performed by: PSYCHIATRY & NEUROLOGY

## 2020-03-12 PROCEDURE — 99214 OFFICE O/P EST MOD 30 MIN: CPT | Performed by: PSYCHIATRY & NEUROLOGY

## 2020-03-12 ASSESSMENT — ENCOUNTER SYMPTOMS
EYES NEGATIVE: 1
GASTROINTESTINAL NEGATIVE: 1
ALLERGIC/IMMUNOLOGIC NEGATIVE: 1
RESPIRATORY NEGATIVE: 1

## 2020-03-12 NOTE — PROGRESS NOTES
early myelomalacia. EEG normal  . Treponema pallidum nonreactive , H02 673 , folic acid 86.0, TSH normal , OPAL negative      Past Medical History:   Diagnosis Date    Anxiety     Arthritis     Cataracts, bilateral     Depression     Diabetes mellitus ([facility]     pre diabetic    Hearing loss     Hyperlipidemia     Hypertension     MS (multiple sclerosis) ([facility]     Osteoarthritis     Osteoporosis        Past Surgical History:   Procedure Laterality Date    EYE SURGERY      x3    HERNIA REPAIR  05/2017    ingial    KNEE SURGERY      menicus right 2007    SHOULDER SURGERY      right rotator cuff 2016       Family History   Adopted: Yes       Social History     Socioeconomic History    Marital status:      Spouse name: None    Number of children: None    Years of education: None    Highest education level: None   Occupational History    None   Social Needs    Financial resource strain: None    Food insecurity     Worry: None     Inability: None    Transportation needs     Medical: None     Non-medical: None   Tobacco Use    Smoking status: Never Smoker    Smokeless tobacco: Never Used   Substance and Sexual Activity    Alcohol use: Yes     Alcohol/week: 1.0 standard drinks     Types: 1 Shots of liquor per week     Frequency: 2-3 times a week     Drinks per session: 1 or 2     Comment: Started Alc 24 y.o; pt is very vague; in her 45s and 46s, partied more often than now-getting drunk over the wkends--Vodka; since 89, getting drunk 2 x mo; past 2 years-1-1-1/2 shot of whiskey-mixed drink every day. DAVID--1/14/2018;  No DUI or [place] Drug use: No    Sexual activity: Yes     Partners: Male   Lifestyle    Physical activity     Days per week: None     Minutes per session: None    Stress: None   Relationships    Social connections     Talks on phone: None     Gets together: None     Attends Catholic service: None     Active member of club or organization: None     Attends meetings of clubs or organizations: None     Relationship status: None    Intimate partner violence     Fear of current or ex partner: None     Emotionally abused: None     Physically abused: None     Forced sexual activity: None   Other Topics Concern    None   Social History Narrative    None       Current Outpatient Medications   Medication Sig Dispense Refill    meloxicam (MOBIC) 15 MG tablet Take 1 tablet by mouth daily 30 tablet 0    atorvastatin (LIPITOR) 10 MG tablet Take 10 mg by mouth daily      citalopram (CELEXA) 20 MG tablet Take 1 tablet by mouth daily 30 tablet 3    ondansetron (ZOFRAN ODT) 4 MG disintegrating tablet Take 1 tablet by mouth every 8 hours as needed for Nausea 12 tablet 0    amLODIPine (NORVASC) 5 MG tablet Take 1 tablet by mouth daily 30 tablet 5    Denosumab (PROLIA SC) Inject into the skin      ALPRAZolam (XANAX) 0.5 MG tablet Take 0.5 mg by mouth 3 times daily prn.  CALCIUM PO one tab daily      diclofenac (VOLTAREN) 75 MG EC tablet Take 1 tablet by mouth 2 times daily (with meals) for 14 days (Patient not taking: Reported on 3/12/2020) 28 tablet 0    KLOR-CON M10 10 MEQ extended release tablet TAKE 1 TABLET BY MOUTH EVERY DAY (Patient not taking: Reported on 1/23/2020) 30 tablet 0    meclizine (ANTIVERT) 25 MG tablet Take 1 po tid PRN (Patient not taking: Reported on 3/12/2020) 60 tablet 1    SIMVASTATIN PO Take by mouth      vitamin D (CHOLECALCIFEROL) 1000 UNIT TABS tablet Take 1,000 Units by mouth daily       No current facility-administered medications for this visit. Allergies   Allergen Reactions    Ciprofloxacin Hives and Itching    Lisinopril Other (See Comments)    Codeine Other (See Comments)       Review of Systems   Constitutional: Negative. HENT: Negative. Eyes: Negative. Respiratory: Negative. Cardiovascular: Negative. Gastrointestinal: Negative. Endocrine: Negative. Genitourinary: Positive for frequency.    Musculoskeletal: Positive for instability     3. Benign paroxysmal positional vertigo, unspecified laterality  PT vestibular rehab   Recommendations are to proceed with decompressive surgery to neck wanting another neurosurgical opinion  She needs to use cane with baseline imbalance . There does appear to be some peripheral vestibular component to proceed with vestibular rehabilitation.  She does have multiple sclerosis that has been felt not to be active        Plan:      Orders Placed This Encounter   Procedures    PT vestibular rehab     Standing Status:   Future     Standing Expiration Date:   3/12/2021            Shalonda Loomis MD

## 2020-03-19 NOTE — TELEPHONE ENCOUNTER
Yasemin Altjesse called the office and left a message on my voicemail. Patient stated that she forgot to ask Dr. Camilla Ureña for a new Citalopram Rx at her follow up. Yasemin Mayorga stated that she only takes 10 mg q hs and would like this to go to the [facility] in [address] [place] [address]. This was discussed with Dr. Camilla Ureña and he was agreeable with this. Patient is due back for follow up April 9, 2020.

## 2020-03-20 RX ORDER — CITALOPRAM 10 MG/1
10 TABLET ORAL DAILY
Qty: 30 TABLET | Refills: 0 | Status: SHIPPED | OUTPATIENT
Start: 2020-03-20 | End: 2020-04-20

## 2020-04-06 ENCOUNTER — TELEPHONE (OUTPATIENT)
Dept: PRIMARY CARE CLINIC | Age: 75
End: 2020-04-06

## 2020-04-06 NOTE — TELEPHONE ENCOUNTER
Pt states that 2 weeks ago she fell down face first.  Since then she has had a constant headache, had neck pain, pain in her nose, and says her eyeballs aches. She is wondering what to do. Please advise.

## 2020-04-07 NOTE — TELEPHONE ENCOUNTER
Called and left detailed message asking patient to call and schedule a virtual visit with Dr. Paula Xiong today.

## 2020-04-13 ENCOUNTER — TELEPHONE (OUTPATIENT)
Dept: PRIMARY CARE CLINIC | Age: 75
End: 2020-04-13

## 2020-04-13 NOTE — TELEPHONE ENCOUNTER
Left message for patient to call office and be set up for a VV visit, please put in appt notes that per Dr. Radha Muñoz ok to do telemedicine visit, patient unable to figure out her mychart.

## 2020-04-13 NOTE — TELEPHONE ENCOUNTER
Called patient and tried to set her up for a VV visit, she is unable to get her mychart set up. Asking if she can just do a telephone call visit and speak to you rather than come in.  Please advise

## 2020-04-13 NOTE — TELEPHONE ENCOUNTER
Patient returned call and was able to get set up for visit tomorrow with provider, understands provider will be calling her.

## 2020-04-14 ENCOUNTER — TELEPHONE (OUTPATIENT)
Dept: PRIMARY CARE CLINIC | Age: 75
End: 2020-04-14

## 2020-04-14 ENCOUNTER — HOSPITAL ENCOUNTER (OUTPATIENT)
Dept: CT IMAGING | Age: 75
Discharge: HOME | End: 2020-04-16
Payer: MEDICARE

## 2020-04-14 PROBLEM — F41.9 ANXIETY: Status: ACTIVE | Noted: 2020-04-14

## 2020-04-14 PROBLEM — M19.90 ARTHRITIS: Status: ACTIVE | Noted: 2020-04-14

## 2020-04-14 PROBLEM — F32.A DEPRESSIVE DISORDER: Status: ACTIVE | Noted: 2020-04-14

## 2020-04-14 PROBLEM — G43.909 MIGRAINE HEADACHE: Status: ACTIVE | Noted: 2020-04-14

## 2020-04-14 PROCEDURE — 70450 CT HEAD/BRAIN W/O DYE: CPT

## 2020-04-15 ENCOUNTER — TELEPHONE (OUTPATIENT)
Dept: PRIMARY CARE CLINIC | Age: 75
End: 2020-04-15

## 2020-04-15 NOTE — TELEPHONE ENCOUNTER
Called patient and advised her of providers message within the CT results. Patient verbalized understanding.

## 2020-04-15 NOTE — TELEPHONE ENCOUNTER
Pt would like to know what the results of her CT of Head WO Contrast that she had yesterday mean. She would like the interpretation to be in laymans terms for her to understand. Pt would also like to have results faxed to [facility]. States that she has an appt with them tomorrow. Pt will be getting fax number.      Please advise

## 2020-04-20 RX ORDER — CITALOPRAM 10 MG/1
TABLET ORAL
Qty: 30 TABLET | Refills: 0 | Status: SHIPPED | OUTPATIENT
Start: 2020-04-20 | End: 2020-06-04 | Stop reason: ALTCHOICE

## 2020-05-04 ENCOUNTER — TELEPHONE (OUTPATIENT)
Dept: PRIMARY CARE CLINIC | Age: 75
End: 2020-05-04

## 2020-05-07 ENCOUNTER — TELEPHONE (OUTPATIENT)
Dept: PRIMARY CARE CLINIC | Age: 75
End: 2020-05-07

## 2020-05-07 NOTE — TELEPHONE ENCOUNTER
Pt upset that most recent head ct was not faxed to [facility]. Please fax to: Dr. Ocampo Gathers fax # [phone]. Pt would also like report mailed to her.

## 2020-05-08 NOTE — TELEPHONE ENCOUNTER
Aliyah  called the office and left a message on my VM. She would like to increase her Celexa dose back up to 20 mg. Patient states that she had dropped it down to 10 mg because she thought that perhaps it was causing her dizziness. She has had recent diskectomy and her mood has been low. This was discussed with Dr. Dilip Delarosa and he was agreeable with this. Call placed back to Aliyah Lewis this afternoon and this information was given. I told her I would put the new order in and send it to Dr. Dilip Delarosa for next week. Patient has enough current supply of the 10 mg to increase the dose. I asked that she call if there were any problems. Patient has a follow up with Dr. Dilip Delarosa on 6/19/2020.

## 2020-05-11 RX ORDER — CITALOPRAM 20 MG/1
20 TABLET ORAL DAILY
Qty: 90 TABLET | Refills: 0 | Status: SHIPPED | OUTPATIENT
Start: 2020-05-11 | End: 2020-07-02 | Stop reason: SDUPTHER

## 2020-06-04 ENCOUNTER — OFFICE VISIT (OUTPATIENT)
Dept: PRIMARY CARE CLINIC | Age: 75
End: 2020-06-04
Payer: MEDICARE

## 2020-06-04 VITALS
DIASTOLIC BLOOD PRESSURE: 78 MMHG | WEIGHT: 138.6 LBS | OXYGEN SATURATION: 98 % | SYSTOLIC BLOOD PRESSURE: 126 MMHG | RESPIRATION RATE: 16 BRPM | BODY MASS INDEX: 27.99 KG/M2 | HEART RATE: 67 BPM

## 2020-06-04 PROCEDURE — 3017F COLORECTAL CA SCREEN DOC REV: CPT | Performed by: INTERNAL MEDICINE

## 2020-06-04 PROCEDURE — 1090F PRES/ABSN URINE INCON ASSESS: CPT | Performed by: INTERNAL MEDICINE

## 2020-06-04 PROCEDURE — 99213 OFFICE O/P EST LOW 20 MIN: CPT | Performed by: INTERNAL MEDICINE

## 2020-06-04 PROCEDURE — G8417 CALC BMI ABV UP PARAM F/U: HCPCS | Performed by: INTERNAL MEDICINE

## 2020-06-04 PROCEDURE — G8427 DOCREV CUR MEDS BY ELIG CLIN: HCPCS | Performed by: INTERNAL MEDICINE

## 2020-06-04 PROCEDURE — 1036F TOBACCO NON-USER: CPT | Performed by: INTERNAL MEDICINE

## 2020-06-04 PROCEDURE — 1123F ACP DISCUSS/DSCN MKR DOCD: CPT | Performed by: INTERNAL MEDICINE

## 2020-06-04 PROCEDURE — G8399 PT W/DXA RESULTS DOCUMENT: HCPCS | Performed by: INTERNAL MEDICINE

## 2020-06-04 PROCEDURE — 4040F PNEUMOC VAC/ADMIN/RCVD: CPT | Performed by: INTERNAL MEDICINE

## 2020-06-07 ASSESSMENT — ENCOUNTER SYMPTOMS
WHEEZING: 0
DIARRHEA: 0
ABDOMINAL PAIN: 0
ABDOMINAL DISTENTION: 0
COUGH: 0
CONSTIPATION: 0
BACK PAIN: 0
SINUS PRESSURE: 0
SINUS PAIN: 0
VOMITING: 0
NAUSEA: 0
SHORTNESS OF BREATH: 0

## 2020-06-07 NOTE — PROGRESS NOTES
directed. I spent a total of 15 minutes face to face with this patient. Over 50% of that time was spent on counseling and care coordination. Please see assessment and plan for details. Electronically signed by Jessica Mendoza MD on 6/7/2020 at 3:11 PM      Please note that this chart was generated using voice recognition Dragon dictation software. Although every effort was made to ensure the accuracy of this automatedtranscription, some errors in transcription may have occurred.

## 2020-06-19 ENCOUNTER — OFFICE VISIT (OUTPATIENT)
Dept: NEUROLOGY | Age: 75
End: 2020-06-19
Payer: MEDICARE

## 2020-06-19 VITALS
DIASTOLIC BLOOD PRESSURE: 79 MMHG | HEART RATE: 79 BPM | TEMPERATURE: 98.2 F | BODY MASS INDEX: 27.82 KG/M2 | HEIGHT: 59 IN | SYSTOLIC BLOOD PRESSURE: 132 MMHG | WEIGHT: 138 LBS

## 2020-06-19 PROCEDURE — 1036F TOBACCO NON-USER: CPT | Performed by: PSYCHIATRY & NEUROLOGY

## 2020-06-19 PROCEDURE — 3017F COLORECTAL CA SCREEN DOC REV: CPT | Performed by: PSYCHIATRY & NEUROLOGY

## 2020-06-19 PROCEDURE — 1123F ACP DISCUSS/DSCN MKR DOCD: CPT | Performed by: PSYCHIATRY & NEUROLOGY

## 2020-06-19 PROCEDURE — 99214 OFFICE O/P EST MOD 30 MIN: CPT | Performed by: PSYCHIATRY & NEUROLOGY

## 2020-06-19 PROCEDURE — G8417 CALC BMI ABV UP PARAM F/U: HCPCS | Performed by: PSYCHIATRY & NEUROLOGY

## 2020-06-19 PROCEDURE — 1090F PRES/ABSN URINE INCON ASSESS: CPT | Performed by: PSYCHIATRY & NEUROLOGY

## 2020-06-19 PROCEDURE — G8427 DOCREV CUR MEDS BY ELIG CLIN: HCPCS | Performed by: PSYCHIATRY & NEUROLOGY

## 2020-06-19 PROCEDURE — 4040F PNEUMOC VAC/ADMIN/RCVD: CPT | Performed by: PSYCHIATRY & NEUROLOGY

## 2020-06-19 PROCEDURE — G8399 PT W/DXA RESULTS DOCUMENT: HCPCS | Performed by: PSYCHIATRY & NEUROLOGY

## 2020-06-19 ASSESSMENT — ENCOUNTER SYMPTOMS
RESPIRATORY NEGATIVE: 1
EYES NEGATIVE: 1
GASTROINTESTINAL NEGATIVE: 1
ALLERGIC/IMMUNOLOGIC NEGATIVE: 1
BACK PAIN: 1

## 2020-06-19 NOTE — PROGRESS NOTES
and thyroid . Normal size. No bruits  Respiratory . Breathsounds clear bilaterally  Cardiovascular: Auscultation of heart with regular rate and rhythm  Musculoskeletal. Muscle bulk and tone normal                                                           Muscle strength  5/5 strength throughout                                                                                No dysmetria or dysdiadokinesis  No tremor   Normal fine motor  Gait imbalance   Orientation Alert and oriented x 3 . WORLD able to spell forwards not backwards . Serial 7 to 86   Attention and concentration normal  Short term memory 3 words out of 3 in one minute   Language process and speech normal . No aphasia   Cranial nerve 2 normal acuety and visual fields  Cranial nerve 3, 4 and 6 . Extraocular muscles are intact . Pupils are equal and reactive   Cranial nerve 5 . Normal strength of masseter and temporalis . Intact corneal reflex. Normal facial sensation  Cranial nerve 7 normal exam   Cranial nerve 8. Grossly intact hearing   Cranial nerve 9 and 10. Symmetric palate elevation   Cranial nerve 11 , 5 out of 5 strength   Cranial Nerve 12 midline tongue . No atrophy  Sensation . Normal proprioception . Intact Vibration . Normal pinprick and light touch   Deep Tendon Reflexes normal  Plantar response flexor bilaterally    Assessment:       Diagnosis Orders   1. Gait instability  PT eval and treat   2. Cervical myelopathy (HCC)  PT eval and treat   3. Multiple sclerosis ([facility]     She has undergone cervical decompression with still some baseline imbalance to undergo course of PT for balance and gait retraining      Plan:      Orders Placed This Encounter   Procedures    PT eval and treat     Standing Status:   Future     Standing Expiration Date:   6/19/2021     Scheduling Instructions:      Dx Gait imbalance  Cervical myelopathy .  Balance and gait retaining            Marcello Infante MD

## 2020-06-30 ENCOUNTER — OFFICE VISIT (OUTPATIENT)
Dept: PRIMARY CARE CLINIC | Age: 75
End: 2020-06-30
Payer: MEDICARE

## 2020-06-30 VITALS
WEIGHT: 139.4 LBS | DIASTOLIC BLOOD PRESSURE: 80 MMHG | OXYGEN SATURATION: 98 % | BODY MASS INDEX: 28.1 KG/M2 | SYSTOLIC BLOOD PRESSURE: 120 MMHG | RESPIRATION RATE: 16 BRPM | HEART RATE: 79 BPM | HEIGHT: 59 IN | TEMPERATURE: 99.9 F

## 2020-06-30 PROBLEM — G95.9 CERVICAL MYELOPATHY (HCC): Status: ACTIVE | Noted: 2020-04-22

## 2020-06-30 PROCEDURE — 4040F PNEUMOC VAC/ADMIN/RCVD: CPT | Performed by: INTERNAL MEDICINE

## 2020-06-30 PROCEDURE — 3017F COLORECTAL CA SCREEN DOC REV: CPT | Performed by: INTERNAL MEDICINE

## 2020-06-30 PROCEDURE — G0438 PPPS, INITIAL VISIT: HCPCS | Performed by: INTERNAL MEDICINE

## 2020-06-30 PROCEDURE — 1123F ACP DISCUSS/DSCN MKR DOCD: CPT | Performed by: INTERNAL MEDICINE

## 2020-06-30 ASSESSMENT — LIFESTYLE VARIABLES
HOW MANY STANDARD DRINKS CONTAINING ALCOHOL DO YOU HAVE ON A TYPICAL DAY: 0
HOW OFTEN DO YOU HAVE SIX OR MORE DRINKS ON ONE OCCASION: 0
AUDIT-C TOTAL SCORE: 4
HOW OFTEN DO YOU HAVE A DRINK CONTAINING ALCOHOL: 4

## 2020-06-30 ASSESSMENT — PATIENT HEALTH QUESTIONNAIRE - PHQ9: SUM OF ALL RESPONSES TO PHQ QUESTIONS 1-9: 17

## 2020-06-30 NOTE — PROGRESS NOTES
 EYE SURGERY      x3    HERNIA REPAIR  05/2017    ingial    KNEE SURGERY      menicus right 2007    SHOULDER SURGERY      right rotator cuff 2016       Family History   Adopted: Yes       CareTeam (Including outside providers/suppliers regularly involved in providing care):   Patient Care Team:  Tenzin Moss MD as PCP - General (Internal Medicine)  Tenzin Moss MD as PCP [facility]    Wt Readings from Last 3 Encounters:   06/30/20 139 lb 6.4 oz (63.2 kg)   06/19/20 138 lb (62.6 kg)   06/04/20 138 lb 9.6 oz (62.9 kg)     Vitals:    06/30/20 1331   BP: 120/80   Pulse: 79   Resp: 16   Temp: 99.9 °F (37.7 °C)   TempSrc: Temporal   SpO2: 98%   Weight: 139 lb 6.4 oz (63.2 kg)   Height: 4' 11\" (1.499 m)     Body mass index is 28.16 kg/m². Based upon direct observation of the patient, evaluation of cognition reveals recent and remote memory intact.     General Appearance: alert and oriented to person, place and time, well developed and well- nourished, in no acute distress  Skin: warm and dry, no rash or erythema  Head: normocephalic and atraumatic  Eyes: pupils equal, round, and reactive to light, extraocular eye movements intact, conjunctivae normal  ENT: tympanic membrane, external ear and ear canal normal bilaterally, nose without deformity, nasal mucosa and turbinates normal without polyps  Neck: supple and non-tender without mass, no thyromegaly or thyroid nodules, no cervical lymphadenopathy  Pulmonary/Chest: clear to auscultation bilaterally- no wheezes, rales or rhonchi, normal air movement, no respiratory distress  Cardiovascular: normal rate, regular rhythm, normal S1 and S2, no murmurs, rubs, clicks, or gallops, distal pulses intact, no carotid bruits  Abdomen: soft, non-tender, non-distended, normal bowel sounds, no masses or organomegaly  Extremities: no cyanosis, clubbing or edema  Musculoskeletal: normal range of motion, no joint swelling, deformity or

## 2020-06-30 NOTE — PATIENT INSTRUCTIONS
Personalized Preventive Plan for Jamie Pantoja - 6/30/2020  Medicare offers a range of preventive health benefits. Some of the tests and screenings are paid in full while other may be subject to a deductible, co-insurance, and/or copay. Some of these benefits include a comprehensive review of your medical history including lifestyle, illnesses that may run in your family, and various assessments and screenings as appropriate. After reviewing your medical record and screening and assessments performed today your provider may have ordered immunizations, labs, imaging, and/or referrals for you. A list of these orders (if applicable) as well as your Preventive Care list are included within your After Visit Summary for your review. Other Preventive Recommendations:    · A preventive eye exam performed by an eye specialist is recommended every 1-2 years to screen for glaucoma; cataracts, macular degeneration, and other eye disorders. · A preventive dental visit is recommended every 6 months. · Try to get at least 150 minutes of exercise per week or 10,000 steps per day on a pedometer . · Order or download the FREE \"Exercise & Physical Activity: Your Everyday Guide\" from [employer]. Call 5-418.168.5518 or search [employer] online. · You need 2645-9119 mg of calcium and 4324-4283 IU of vitamin D per day. It is possible to meet your calcium requirement with diet alone, but a vitamin D supplement is usually necessary to meet this goal.  · When exposed to the sun, use a sunscreen that protects against both UVA and UVB radiation with an SPF of 30 or greater. Reapply every 2 to 3 hours or after sweating, drying off with a towel, or swimming. · Always wear a seat belt when traveling in a car. Always wear a helmet when riding a bicycle or motorcycle.

## 2020-07-02 ENCOUNTER — TELEPHONE (OUTPATIENT)
Dept: PRIMARY CARE CLINIC | Age: 75
End: 2020-07-02

## 2020-07-02 ENCOUNTER — OFFICE VISIT (OUTPATIENT)
Dept: PRIMARY CARE CLINIC | Age: 75
End: 2020-07-02
Payer: MEDICARE

## 2020-07-02 VITALS
WEIGHT: 139 LBS | RESPIRATION RATE: 16 BRPM | HEART RATE: 82 BPM | BODY MASS INDEX: 28.02 KG/M2 | SYSTOLIC BLOOD PRESSURE: 120 MMHG | OXYGEN SATURATION: 94 % | TEMPERATURE: 98.1 F | HEIGHT: 59 IN | DIASTOLIC BLOOD PRESSURE: 70 MMHG

## 2020-07-02 PROBLEM — F03.90 DEMENTIA WITHOUT BEHAVIORAL DISTURBANCE (HCC): Status: ACTIVE | Noted: 2020-07-02

## 2020-07-02 LAB
ABSOLUTE BASO #: 0 X10E9/L (ref 0–0.9)
ABSOLUTE EOS #: 0.1 X10E9/L (ref 0–0.4)
ABSOLUTE LYMPH #: 1.3 X10E9/L (ref 1–3.5)
ABSOLUTE MONO #: 0.3 X10E9/L (ref 0–0.9)
ABSOLUTE NEUT #: 1.8 X10E9/L (ref 1.5–6.6)
ALBUMIN SERPL-MCNC: 4.5 G/DL (ref 3.2–5.3)
ALK PHOSPHATASE: 97 U/L (ref 39–130)
ALT SERPL-CCNC: 15 U/L (ref 0–31)
ANION GAP SERPL CALCULATED.3IONS-SCNC: 9 MMOL/L (ref 5–15)
AST SERPL-CCNC: 13 U/L (ref 0–41)
AVERAGE GLUCOSE: 111 MG/DL
BASOPHILS RELATIVE PERCENT: 0.8 %
BILIRUB SERPL-MCNC: 0.6 MG/DL (ref 0.3–1.2)
BUN BLDV-MCNC: 14 MG/DL (ref 5–27)
CALCIUM SERPL-MCNC: 9 MG/DL (ref 8.5–10.5)
CHLORIDE BLD-SCNC: 106 MMOL/L (ref 98–109)
CHOLESTEROL/HDL RATIO: 2.5 (ref 1–5)
CHOLESTEROL: 160 MG/DL (ref 150–200)
CO2: 27 MMOL/L (ref 22–32)
CREAT SERPL-MCNC: 0.81 MG/DL (ref 0.4–1)
EGFR AFRICAN AMERICAN: >60 ML/MIN/1.73SQ.M
EGFR IF NONAFRICAN AMERICAN: >60 ML/MIN/1.73SQ.M
EOSINOPHILS RELATIVE PERCENT: 3.6 %
FOLATE: 23.3 NG/ML
GLUCOSE: 109 MG/DL (ref 65–99)
HBA1C MFR BLD: 5.5 % (ref 4.4–6.4)
HCT VFR BLD CALC: 43.3 % (ref 34–48)
HDLC SERPL-MCNC: 63 MG/DL
HEMOGLOBIN: 14.4 G/DL (ref 11.7–16.1)
LDL CHOLESTEROL CALCULATED: 82 MG/DL
LDL/HDL RATIO: 1.3
LYMPHOCYTE %: 35.4 %
MCH RBC QN AUTO: 31.6 PG (ref 27–35)
MCHC RBC AUTO-ENTMCNC: 33.2 G/DL (ref 31–36)
MCV RBC AUTO: 95 FL (ref 81–101)
MONOCYTES # BLD: 9.7 %
NEUTROPHILS RELATIVE PERCENT: 50.5 %
PDW BLD-RTO: 15.4 % (ref 11.5–14.7)
PLATELETS: 312 X10E9/L (ref 150–450)
PMV BLD AUTO: 8.2 FL (ref 7–12)
POTASSIUM SERPL-SCNC: 3.8 MMOL/L (ref 3.5–5)
RBC: 4.55 X10E12/L (ref 3.3–5.5)
SODIUM BLD-SCNC: 142 MMOL/L (ref 134–146)
TOTAL PROTEIN: 7.6 G/DL (ref 6–8)
TRIGL SERPL-MCNC: 76 MG/DL (ref 27–150)
TSH SERPL DL<=0.05 MIU/L-ACNC: 1.36 UIU/ML (ref 0.49–4.67)
VITAMIN B-12: 234 PG/ML (ref 180–914)
VITAMIN D 25-HYDROXY: 34.7 NG/ML (ref 30–100)
VLDLC SERPL CALC-MCNC: 15 MG/DL (ref 0–30)
WBC: 3.6 X10E9/L (ref 4–11.8)

## 2020-07-02 PROCEDURE — 99213 OFFICE O/P EST LOW 20 MIN: CPT | Performed by: INTERNAL MEDICINE

## 2020-07-02 PROCEDURE — G8417 CALC BMI ABV UP PARAM F/U: HCPCS | Performed by: INTERNAL MEDICINE

## 2020-07-02 PROCEDURE — 1090F PRES/ABSN URINE INCON ASSESS: CPT | Performed by: INTERNAL MEDICINE

## 2020-07-02 PROCEDURE — 1036F TOBACCO NON-USER: CPT | Performed by: INTERNAL MEDICINE

## 2020-07-02 PROCEDURE — G8399 PT W/DXA RESULTS DOCUMENT: HCPCS | Performed by: INTERNAL MEDICINE

## 2020-07-02 PROCEDURE — 1123F ACP DISCUSS/DSCN MKR DOCD: CPT | Performed by: INTERNAL MEDICINE

## 2020-07-02 PROCEDURE — G8427 DOCREV CUR MEDS BY ELIG CLIN: HCPCS | Performed by: INTERNAL MEDICINE

## 2020-07-02 PROCEDURE — 3017F COLORECTAL CA SCREEN DOC REV: CPT | Performed by: INTERNAL MEDICINE

## 2020-07-02 PROCEDURE — 4040F PNEUMOC VAC/ADMIN/RCVD: CPT | Performed by: INTERNAL MEDICINE

## 2020-07-02 RX ORDER — CITALOPRAM 40 MG/1
40 TABLET ORAL DAILY
Qty: 30 TABLET | Refills: 0 | Status: SHIPPED | OUTPATIENT
Start: 2020-07-02 | End: 2020-09-04 | Stop reason: SDUPTHER

## 2020-07-02 NOTE — TELEPHONE ENCOUNTER
Pt called and said she went to go do her bloodwork but for the A1C dx code they won't cover it saying Nodule.  They need a different code, please change, thank you      Fax: [phone]

## 2020-07-05 NOTE — PROGRESS NOTES
[address] [facility] PRIMARY CARE  [facility] [address] [facility] [address]  [address] [address]  Dept: [phone]  Dept Fax: [phone]    Vilma Cardenas is a 76 y.o. female who presents today for her medical conditions/complaints as noted below. Chief Complaint   Patient presents with    Depression    Anxiety    Insomnia    Discuss Labs     A1C lab needs different diagnosis code       HPI:     This is a 77-year-old female who is here for discussion about her anxiety depression insomnia. Blood pressure is normal.    Discussed about increased the dose of Celexa. Change the diagnosis code for hemoglobin A1c as it was not covered. No other complaints or concerns.       Hemoglobin A1C (%)   Date Value   2020 5.5   2019 5.7             ( goal A1C is < 7)   No results found for: LABMICR  LDL Cholesterol (mg/dL)   Date Value   2019 86     LDL Calculated (mg/dL)   Date Value   2020 82       (goal LDL is <100)   AST (U/L)   Date Value   2020 13     ALT (U/L)   Date Value   2020 15     BUN (mg/dL)   Date Value   2020 14     BP Readings from Last 3 Encounters:   20 120/70   20 120/80   20 132/79          (goal 120/80)    Past Medical History:   Diagnosis Date    Anxiety     Arthritis     Cataracts, bilateral     Depression     Diabetes mellitus ([address]     pre diabetic    Hearing loss     Hyperlipidemia     Hypertension     MS (multiple sclerosis) ([facility]     Osteoarthritis     Osteoporosis       Past Surgical History:   Procedure Laterality Date    CERVICAL DISCECTOMY  2020    Dr. Zandra Espinoza [place] [address]  2020    [place] [facility]    EYE SURGERY      x3   [address]  2017    ingial    KNEE SURGERY      menicus right 2007    SHOULDER SURGERY      right rotator cuff 2016       Family History   Adopted: Yes       Social History     Tobacco Use    Smoking status: Never Smoker    Smokeless tobacco: Never Used   Substance Use Topics    Alcohol use: Yes     Alcohol/week: 1.0 standard drinks     Types: 1 Shots of liquor per week     Frequency: 2-3 times a week     Drinks per session: 1 or 2     Comment: Started Alc 24 y.o; pt is very vague; in her 45s and 46s, partied more often than now-getting drunk over the wkends--Vodka; since 89, getting drunk 2 x mo; past 2 years-1-1-1/2 shot of whiskey-mixed drink every day. DAVID--1/14/2018; No DUI or DT. Current Outpatient Medications   Medication Sig Dispense Refill    citalopram (CELEXA) 40 MG tablet Take 1 tablet by mouth daily 30 tablet 0    Calcium Carb-Cholecalciferol (CALCIUM-VITAMIN D) 500-200 MG-UNIT per tablet Take 1 tablet by mouth daily       atorvastatin (LIPITOR) 10 MG tablet Take 10 mg by mouth daily      ondansetron (ZOFRAN ODT) 4 MG disintegrating tablet Take 1 tablet by mouth every 8 hours as needed for Nausea 12 tablet 0    amLODIPine (NORVASC) 5 MG tablet Take 1 tablet by mouth daily 30 tablet 5    Denosumab (PROLIA SC) Inject into the skin      ALPRAZolam (XANAX) 0.5 MG tablet Take 0.5 mg by mouth as needed. prn       No current facility-administered medications for this visit.       Allergies   Allergen Reactions    Ciprofloxacin Hives and Itching    Lisinopril Other (See Comments)    Codeine Other (See Comments)       Health Maintenance   Topic Date Due    Hepatitis C screen  1945    Shingles Vaccine (1 of 2) 01/20/1995    DTaP/Tdap/Td vaccine (1 - Tdap) 08/20/2020 (Originally 1/20/1964)    Pneumococcal 65+ years Vaccine (1 of 1 - PPSV23) 06/04/2021 (Originally 1/20/2010)    Flu vaccine (1) 09/01/2020    Annual Wellness Visit (AWV)  07/01/2021    Lipid screen  07/02/2021    Potassium monitoring  07/02/2021    Creatinine monitoring  07/02/2021    Colon cancer screen colonoscopy  09/09/2026    DEXA (modify frequency per FRAX score)  Completed    Hepatitis A vaccine  Aged Out Follow up as directed. I spent a total of 25 minutes face to face with this patient. Over 50% of that time was spent on counseling and care coordination. Please see assessment and plan for details. Electronically signed by Lopez Ramos MD on 7/5/2020 at 12:17 PM      Please note that this chart was generated using voice recognition Dragon dictation software. Although every effort was made to ensure the accuracy of this automatedtranscription, some errors in transcription may have occurred.

## 2020-07-06 RX ORDER — ONDANSETRON 4 MG/1
4 TABLET, ORALLY DISINTEGRATING ORAL EVERY 8 HOURS PRN
Qty: 12 TABLET | Refills: 0 | Status: SHIPPED | OUTPATIENT
Start: 2020-07-06

## 2020-07-06 RX ORDER — ONDANSETRON 4 MG/1
4 TABLET, ORALLY DISINTEGRATING ORAL EVERY 8 HOURS PRN
Qty: 12 TABLET | Refills: 0 | Status: CANCELLED | OUTPATIENT
Start: 2020-07-06

## 2020-07-06 NOTE — TELEPHONE ENCOUNTER
Health Maintenance   Topic Date Due    Hepatitis C screen  1945    Shingles Vaccine (1 of 2) 01/20/1995    DTaP/Tdap/Td vaccine (1 - Tdap) 08/20/2020 (Originally 1/20/1964)    Pneumococcal 65+ years Vaccine (1 of 1 - PPSV23) 06/04/2021 (Originally 1/20/2010)    Flu vaccine (1) 09/01/2020    Annual Wellness Visit (AWV)  07/01/2021    Lipid screen  07/02/2021    Potassium monitoring  07/02/2021    Creatinine monitoring  07/02/2021    Colon cancer screen colonoscopy  09/09/2026    DEXA (modify frequency per FRAX score)  Completed    Hepatitis A vaccine  Aged Out    Hepatitis B vaccine  Aged Out    Hib vaccine  Aged Out    Meningococcal (ACWY) vaccine  Aged Out             (applicable per patient's age: Cancer Screenings, Depression Screening, Fall Risk Screening, Immunizations)    Hemoglobin A1C (%)   Date Value   07/02/2020 5.5   04/24/2019 5.7     LDL Cholesterol (mg/dL)   Date Value   04/24/2019 86     LDL Calculated (mg/dL)   Date Value   07/02/2020 82     AST (U/L)   Date Value   07/02/2020 13     ALT (U/L)   Date Value   07/02/2020 15     BUN (mg/dL)   Date Value   07/02/2020 14      (goal A1C is < 7)   (goal LDL is <100) need 30-50% reduction from baseline     BP Readings from Last 3 Encounters:   07/02/20 120/70   06/30/20 120/80   06/19/20 132/79    (goal /80)      All Future Testing planned in [facility]:  Lab Frequency Next Occurrence   PT vestibular rehab Once 03/26/2020   PT eval and treat Once 07/03/2020   Lipid Panel Once 06/30/2020   TSH With Reflex Ft4 Once 06/30/2020   US THYROID Once 06/30/2020   CBC With Auto Differential Once 06/30/2020   Comprehensive Metabolic Panel Once 28/38/6484   Vitamin B12 & Folate Once 06/30/2020   Vitamin D 25 Hydroxy Once 06/30/2020   Hemoglobin A1C Once 07/02/2020       Next Visit Date:  Future Appointments   Date Time Provider Edilia Brizuela   8/13/2020  3:30 PM Cedric Bennett MD [email or web address]   8/26/2020  4:00 PM Derek Bunn Homero Babb MD [email or web address]     Last OV 7/2/2020       Patient Active Problem List:     MS (multiple sclerosis) ([facility]     Essential hypertension     Neuropathy     Overweight (BMI 25.0-29. 9)     Age related osteoporosis     Current mild episode of major depressive disorder (HCC)     Memory loss     Duplay's periarthritis syndrome     Gastroesophageal reflux disease     Hyperlipidemia     Paraesophageal hernia     Age-related osteoporosis without current pathological fracture     Anxiety     Arthritis     Depressive disorder     Migraine headache     Cervical myelopathy (HCC)     Dementia without behavioral disturbance ([facility]

## 2020-07-13 ENCOUNTER — HOSPITAL ENCOUNTER (OUTPATIENT)
Dept: ULTRASOUND IMAGING | Age: 75
Discharge: HOME | End: 2020-07-15
Payer: MEDICARE

## 2020-07-13 PROCEDURE — 76536 US EXAM OF HEAD AND NECK: CPT

## 2020-07-22 ENCOUNTER — OFFICE VISIT (OUTPATIENT)
Dept: PRIMARY CARE CLINIC | Age: 75
End: 2020-07-22
Payer: MEDICARE

## 2020-07-22 ENCOUNTER — HOSPITAL ENCOUNTER (OUTPATIENT)
Age: 75
Setting detail: SPECIMEN
Discharge: HOME | End: 2020-07-22
Payer: MEDICARE

## 2020-07-22 VITALS
WEIGHT: 138.4 LBS | BODY MASS INDEX: 27.9 KG/M2 | RESPIRATION RATE: 16 BRPM | OXYGEN SATURATION: 96 % | DIASTOLIC BLOOD PRESSURE: 70 MMHG | TEMPERATURE: 99.1 F | SYSTOLIC BLOOD PRESSURE: 124 MMHG | HEIGHT: 59 IN | HEART RATE: 79 BPM

## 2020-07-22 LAB
BILIRUBIN, POC: NORMAL
BLOOD URINE, POC: NORMAL
CLARITY, POC: NORMAL
COLOR, POC: YELLOW
GLUCOSE URINE, POC: NORMAL
KETONES, POC: 15
LEUKOCYTE EST, POC: NORMAL
NITRITE, POC: NORMAL
PH, POC: 6
PROTEIN, POC: 30
SPECIFIC GRAVITY, POC: 1.02
UROBILINOGEN, POC: 0.02

## 2020-07-22 PROCEDURE — 81002 URINALYSIS NONAUTO W/O SCOPE: CPT | Performed by: PHYSICIAN ASSISTANT

## 2020-07-22 PROCEDURE — 99213 OFFICE O/P EST LOW 20 MIN: CPT | Performed by: PHYSICIAN ASSISTANT

## 2020-07-22 RX ORDER — NITROFURANTOIN 25; 75 MG/1; MG/1
100 CAPSULE ORAL 2 TIMES DAILY
Qty: 10 CAPSULE | Refills: 0 | Status: SHIPPED | OUTPATIENT
Start: 2020-07-22 | End: 2020-07-27

## 2020-07-22 ASSESSMENT — ENCOUNTER SYMPTOMS
SINUS PAIN: 0
VOMITING: 0
BACK PAIN: 1
DIARRHEA: 0
ABDOMINAL PAIN: 0
COUGH: 0
NAUSEA: 0
SHORTNESS OF BREATH: 0
CONSTIPATION: 0
RHINORRHEA: 0

## 2020-07-22 NOTE — PROGRESS NOTES
[address] PRIMARY CARE  [facility] [address] [facility] [address]  [address] [address]  Dept: [phone]  Dept Fax: [phone]    Francesco Aguilar is a 76 y.o. female who presents today for her medical conditions/complaints as noted below. Chief Complaint   Patient presents with    Urinary Tract Infection     having spams and cramping x3 days, notes symptoms are improving, pt does note decreased urination, no OTC medications used       HPI:     Patient presents to the office for evaluation of urinary symptoms. She developed suprapubic spasms and cramping late Monday night. The cramping sensation has been intermittent. She states she also felt nauseated and feverish on Monday. She admits to difficulty fully emptying bladder. These symptoms mildly improved over the past two days. She denies fever today, change in bowel habits, dysuria, flank pain, and malodorous urine. She has tried taking Tylenol with no relief. She has history of kidney stones and UTI. Her UTI's in the past have responded well to [facility].       Hemoglobin A1C (%)   Date Value   2020 5.5   2019 5.7             ( goal A1C is < 7)   No results found for: LABMICR  LDL Cholesterol (mg/dL)   Date Value   2019 86     LDL Calculated (mg/dL)   Date Value   2020 82       (goal LDL is <100)   AST (U/L)   Date Value   2020 13     ALT (U/L)   Date Value   2020 15     BUN (mg/dL)   Date Value   2020 14     BP Readings from Last 3 Encounters:   20 124/70   20 120/70   20 120/80          (goal 120/80)    Past Medical History:   Diagnosis Date    Anxiety     Arthritis     Cataracts, bilateral     Depression     Diabetes mellitus ([address]     pre diabetic    Hearing loss     Hyperlipidemia     Hypertension     MS (multiple sclerosis) [facility] [address]     Osteoarthritis     Osteoporosis       Past Surgical History:   Procedure Laterality Date    CERVICAL DISCECTOMY 04/22/2020    Dr. Gamal Espinoza [address]  04/22/2020    [facility], Gamal Roberts MD    EYE SURGERY      x3    HERNIA REPAIR  05/2017    ingial    KNEE SURGERY      menicus right 2007    SHOULDER SURGERY      right rotator cuff 2016       Family History   Adopted: Yes       Social History     Tobacco Use    Smoking status: Never Smoker    Smokeless tobacco: Never Used   Substance Use Topics    Alcohol use: Yes     Alcohol/week: 1.0 standard drinks     Types: 1 Shots of liquor per week     Frequency: 2-3 times a week     Drinks per session: 1 or 2     Comment: Started Alc 24 y.o; pt is very vague; in her 45s and 46s, partied more often than now-getting drunk over the wkends--Vodka; since 89, getting drunk 2 x mo; past 2 years-1-1-1/2 shot of whiskey-mixed drink every day. DAVID--1/14/2018; No DUI or DT. Current Outpatient Medications   Medication Sig Dispense Refill    nitrofurantoin, macrocrystal-monohydrate, (MACROBID) 100 MG capsule Take 1 capsule by mouth 2 times daily for 5 days 10 capsule 0    ondansetron (ZOFRAN ODT) 4 MG disintegrating tablet Take 1 tablet by mouth every 8 hours as needed for Nausea 12 tablet 0    citalopram (CELEXA) 40 MG tablet Take 1 tablet by mouth daily 30 tablet 0    Calcium Carb-Cholecalciferol (CALCIUM-VITAMIN D) 500-200 MG-UNIT per tablet Take 1 tablet by mouth daily       atorvastatin (LIPITOR) 10 MG tablet Take 10 mg by mouth daily      amLODIPine (NORVASC) 5 MG tablet Take 1 tablet by mouth daily 30 tablet 5    Denosumab (PROLIA SC) Inject into the skin      ALPRAZolam (XANAX) 0.5 MG tablet Take 0.5 mg by mouth as needed. prn       No current facility-administered medications for this visit.       Allergies   Allergen Reactions    Ciprofloxacin Hives and Itching    Lisinopril Other (See Comments)    Codeine Other (See Comments)       Health Maintenance   Topic Date Due    Hepatitis C screen  1945    Shingles Vaccine (1 of 2) 01/20/1995    DTaP/Tdap/Td vaccine (1 - Tdap) 08/20/2020 (Originally 1/20/1964)    Pneumococcal 65+ years Vaccine (1 of 1 - PPSV23) 06/04/2021 (Originally 1/20/2010)    Flu vaccine (1) 09/01/2020    Annual Wellness Visit (AWV)  07/01/2021    Lipid screen  07/02/2021    Potassium monitoring  07/02/2021    Creatinine monitoring  07/02/2021    Colon cancer screen colonoscopy  09/09/2026    DEXA (modify frequency per FRAX score)  Completed    Hepatitis A vaccine  Aged Out    Hepatitis B vaccine  Aged Out    Hib vaccine  Aged Out    Meningococcal (ACWY) vaccine  Aged Out       Subjective:      Review of Systems   Constitutional: Positive for chills. Negative for fatigue and fever. HENT: Negative for congestion, rhinorrhea and sinus pain. Respiratory: Negative for cough and shortness of breath. Cardiovascular: Negative for chest pain and leg swelling. Gastrointestinal: Negative for abdominal pain, constipation, diarrhea, nausea and vomiting. Genitourinary: Positive for decreased urine volume, difficulty urinating and pelvic pain. Negative for dysuria, flank pain, frequency and urgency. Musculoskeletal: Positive for back pain (mild ache). Negative for arthralgias and myalgias. Skin: Negative for rash. Neurological: Negative for dizziness and headaches. Psychiatric/Behavioral: Negative for confusion, dysphoric mood and sleep disturbance. The patient is not nervous/anxious. Objective:     Physical Exam  Constitutional:       General: She is not in acute distress. Appearance: Normal appearance. HENT:      Head: Normocephalic. Mouth/Throat:      Mouth: Mucous membranes are moist.   Eyes:      Extraocular Movements: Extraocular movements intact. Conjunctiva/sclera: Conjunctivae normal.      Pupils: Pupils are equal, round, and reactive to light. Neck:      Musculoskeletal: Normal range of motion. Cardiovascular:      Rate and Rhythm: Normal rate and regular rhythm. Pulses: Normal pulses. Heart sounds: Normal heart sounds. Pulmonary:      Effort: Pulmonary effort is normal.      Breath sounds: Normal breath sounds. Abdominal:      General: Abdomen is flat. Bowel sounds are normal.      Palpations: Abdomen is soft. Tenderness: There is abdominal tenderness (suprapubic). There is no right CVA tenderness, left CVA tenderness or guarding. Musculoskeletal:      Right lower leg: No edema. Left lower leg: No edema. Lymphadenopathy:      Cervical: No cervical adenopathy. Skin:     General: Skin is warm. Capillary Refill: Capillary refill takes less than 2 seconds. Neurological:      General: No focal deficit present. Mental Status: She is alert and oriented to person, place, and time. Psychiatric:         Mood and Affect: Mood normal.         Behavior: Behavior normal.       /70   Pulse 79   Temp 99.1 °F (37.3 °C)   Resp 16   Ht 4' 11\" (1.499 m)   Wt 138 lb 6.4 oz (62.8 kg)   SpO2 96%   Breastfeeding No   BMI 27.95 kg/m²     Assessment:       ICD-10-CM    1. Acute cystitis with hematuria  N30.01 nitrofurantoin, macrocrystal-monohydrate, (MACROBID) 100 MG capsule     Culture, Urine   2. Abdominal cramping  R10.9 POCT Urinalysis no Micro   3. Decreased urination  R34 POCT Urinalysis no Micro            Plan:       1. Patient given prescription for Macrobid BID x5 days. She was instructed to use OTC Azo to relieve cramping symptoms. Return if symptoms worsen or fail to improve. Orders Placed This Encounter   Procedures    Culture, Urine     Standing Status:   Future     Standing Expiration Date:   7/22/2021     Order Specific Question:   Specify (ex-cath, midstream, cysto, etc)? Answer:   midstream    POCT Urinalysis no Micro         Patient given educational materials - see patient instructions. Discussed use, benefit, and side effects of prescribedmedications. All patient questions answered. Pt voiced understanding.

## 2020-07-22 NOTE — PATIENT INSTRUCTIONS
Patient Education        Urinary Tract Infection in Women: Care Instructions  Your Care Instructions     A urinary tract infection, or UTI, is a general term for an infection anywhere between the kidneys and the urethra (where urine comes out). Most UTIs are bladder infections. They often cause pain or burning when you urinate. UTIs are caused by bacteria and can be cured with antibiotics. Be sure to complete your treatment so that the infection goes away. Follow-up care is a key part of your treatment and safety. Be sure to make and go to all appointments, and call your doctor if you are having problems. It's also a good idea to know your test results and keep a list of the medicines you take. How can you care for yourself at home? · Take your antibiotics as directed. Do not stop taking them just because you feel better. You need to take the full course of antibiotics. · Drink extra water and other fluids for the next day or two. This may help wash out the bacteria that are causing the infection. (If you have kidney, heart, or liver disease and have to limit fluids, talk with your doctor before you increase your fluid intake.)  · Avoid drinks that are carbonated or have caffeine. They can irritate the bladder. · Urinate often. Try to empty your bladder each time. · To relieve pain, take a hot bath or lay a heating pad set on low over your lower belly or genital area. Never go to sleep with a heating pad in place. To prevent UTIs  · Drink plenty of water each day. This helps you urinate often, which clears bacteria from your system. (If you have kidney, heart, or liver disease and have to limit fluids, talk with your doctor before you increase your fluid intake.)  · Urinate when you need to. · Urinate right after you have sex. · Change sanitary pads often. · Avoid douches, bubble baths, feminine hygiene sprays, and other feminine hygiene products that have deodorants.   · After going to the bathroom,

## 2020-07-24 LAB
CULTURE: ABNORMAL
CULTURE: ABNORMAL
Lab: ABNORMAL
SPECIMEN DESCRIPTION: ABNORMAL

## 2020-07-27 ENCOUNTER — TELEPHONE (OUTPATIENT)
Dept: PRIMARY CARE CLINIC | Age: 75
End: 2020-07-27

## 2020-07-27 RX ORDER — CEPHALEXIN 500 MG/1
500 CAPSULE ORAL 2 TIMES DAILY
Qty: 10 CAPSULE | Refills: 0 | Status: SHIPPED | OUTPATIENT
Start: 2020-07-27 | End: 2020-08-01

## 2020-07-27 NOTE — TELEPHONE ENCOUNTER
Patient called, she is concerned that the macrobid was the not the correct medication for her to be on. She said she is still complaining of pain and for a period of time last night, was unable to urinate. She advised she took a hot shower and was then able to void but was still painful. Please advise.       Allergies   Allergen Reactions    Ciprofloxacin Hives and Itching    Lisinopril Other (See Comments)    Codeine Other (See Comments)

## 2020-07-29 ENCOUNTER — HOSPITAL ENCOUNTER (OUTPATIENT)
Age: 75
Discharge: HOME | End: 2020-07-29
Payer: MEDICARE

## 2020-07-29 ENCOUNTER — HOSPITAL ENCOUNTER (OUTPATIENT)
Dept: ULTRASOUND IMAGING | Age: 75
Discharge: HOME | End: 2020-07-31
Payer: MEDICARE

## 2020-07-29 LAB
ANION GAP SERPL CALCULATED.3IONS-SCNC: 12 MMOL/L (ref 9–17)
BUN BLDV-MCNC: 20 MG/DL (ref 8–23)
BUN/CREAT BLD: ABNORMAL (ref 9–20)
CALCIUM SERPL-MCNC: 10.6 MG/DL (ref 8.6–10.4)
CHLORIDE BLD-SCNC: 99 MMOL/L (ref 98–107)
CO2: 29 MMOL/L (ref 20–31)
CREAT SERPL-MCNC: 0.89 MG/DL (ref 0.5–0.9)
GFR AFRICAN AMERICAN: >60 ML/MIN
GFR NON-AFRICAN AMERICAN: >60 ML/MIN
GFR SERPL CREATININE-BSD FRML MDRD: ABNORMAL ML/MIN/{1.73_M2}
GFR SERPL CREATININE-BSD FRML MDRD: ABNORMAL ML/MIN/{1.73_M2}
GLUCOSE BLD-MCNC: 88 MG/DL (ref 70–99)
POTASSIUM SERPL-SCNC: 3.6 MMOL/L (ref 3.7–5.3)
SODIUM BLD-SCNC: 140 MMOL/L (ref 135–144)

## 2020-07-29 PROCEDURE — 36415 COLL VENOUS BLD VENIPUNCTURE: CPT

## 2020-07-29 PROCEDURE — 76775 US EXAM ABDO BACK WALL LIM: CPT

## 2020-07-29 PROCEDURE — 80048 BASIC METABOLIC PNL TOTAL CA: CPT

## 2020-08-07 ENCOUNTER — TELEPHONE (OUTPATIENT)
Dept: PRIMARY CARE CLINIC | Age: 75
End: 2020-08-07

## 2020-08-07 RX ORDER — SULFAMETHOXAZOLE AND TRIMETHOPRIM 800; 160 MG/1; MG/1
1 TABLET ORAL 2 TIMES DAILY
Qty: 6 TABLET | Refills: 0 | Status: SHIPPED | OUTPATIENT
Start: 2020-08-07 | End: 2020-08-10

## 2020-08-09 ENCOUNTER — HOSPITAL ENCOUNTER (EMERGENCY)
Age: 75
Discharge: HOME | End: 2020-08-09
Attending: EMERGENCY MEDICINE
Payer: MEDICARE

## 2020-08-09 ENCOUNTER — NURSE TRIAGE (OUTPATIENT)
Dept: OTHER | Age: 75
End: 2020-08-09

## 2020-08-09 VITALS
OXYGEN SATURATION: 98 % | RESPIRATION RATE: 16 BRPM | SYSTOLIC BLOOD PRESSURE: 125 MMHG | WEIGHT: 133 LBS | TEMPERATURE: 98.2 F | HEIGHT: 59 IN | HEART RATE: 81 BPM | BODY MASS INDEX: 26.81 KG/M2 | DIASTOLIC BLOOD PRESSURE: 89 MMHG

## 2020-08-09 LAB
-: ABNORMAL
AMORPHOUS: ABNORMAL
BACTERIA: ABNORMAL
BILIRUBIN URINE: NEGATIVE
CASTS UA: ABNORMAL /LPF
COLOR: YELLOW
COMMENT UA: ABNORMAL
CRYSTALS, UA: ABNORMAL /HPF
EPITHELIAL CELLS UA: ABNORMAL /HPF (ref 0–5)
GLUCOSE URINE: ABNORMAL
KETONES, URINE: NEGATIVE
LEUKOCYTE ESTERASE, URINE: ABNORMAL
MUCUS: ABNORMAL
NITRITE, URINE: POSITIVE
OTHER OBSERVATIONS UA: ABNORMAL
PH UA: 6 (ref 5–8)
PROTEIN UA: ABNORMAL
RBC UA: ABNORMAL /HPF (ref 0–2)
RENAL EPITHELIAL, UA: ABNORMAL /HPF
SPECIFIC GRAVITY UA: 1.02 (ref 1–1.03)
TRICHOMONAS: ABNORMAL
TURBIDITY: CLEAR
URINE HGB: ABNORMAL
UROBILINOGEN, URINE: ABNORMAL
WBC UA: ABNORMAL /HPF (ref 0–5)
YEAST: ABNORMAL

## 2020-08-09 PROCEDURE — 87086 URINE CULTURE/COLONY COUNT: CPT

## 2020-08-09 PROCEDURE — 87186 SC STD MICRODIL/AGAR DIL: CPT

## 2020-08-09 PROCEDURE — 81001 URINALYSIS AUTO W/SCOPE: CPT

## 2020-08-09 PROCEDURE — 99283 EMERGENCY DEPT VISIT LOW MDM: CPT

## 2020-08-09 PROCEDURE — 86403 PARTICLE AGGLUT ANTBDY SCRN: CPT

## 2020-08-09 NOTE — ED PROVIDER NOTES
[id number] [facility]  [address] [facility] [address] [facility] [address]  Phone: [phone]  Fax: Gayla Osman [phone]      Pt Name: Calixto Garcia  MRN: [id number]  [place] 1945  Date of evaluation: 8/9/2020    CHIEF COMPLAINT       Chief Complaint   Patient presents with    Dysuria       HISTORY OF PRESENT ILLNESS    Calixto Garcia is a 76 y.o. female who presents to the emergency department with dysuria and lower abdominal cramping. Symptoms have been ongoing for the past couple weeks she was just on Macrobid for a week just finished that a week ago then started on Bactrim yesterday and has had 3 doses continues to have UTI symptoms. Denies any blood in her urine no fevers or chills. She had a retroperitoneal ultrasound done 10 days ago and is scheduled to see the urologist.  Denies any fevers. No diarrhea or constipation. No other symptoms. Admits to dysuria    REVIEW OF SYSTEMS       Constitutional: No fevers or chills   HEENT: No sore throat, rhinorrhea, or earache   Eyes: No blurry vision or double vision no drainage   Cardiovascular: No chest pain or tachycardia   Respiratory: No wheezing or shortness of breath no cough   Gastrointestinal: No nausea, vomiting, diarrhea, constipation, positive abdominal cramping   : No hematuria positive dysuria  Musculoskeletal: No swelling or pain   Skin: No rash   Neurological: No focal neurologic complaints, paresthesias, weakness, or headache     PAST MEDICAL HISTORY    has a past medical history of Anxiety, Arthritis, Cataracts, bilateral, Depression, Diabetes mellitus ([address] Hearing loss, Hyperlipidemia, Hypertension, MS (multiple sclerosis) ([address] Osteoarthritis, and Osteoporosis. SURGICAL HISTORY      has a past surgical history that includes knee surgery; shoulder surgery; hernia repair (05/2017); eye surgery; Cervical discectomy (04/22/2020); and cervical fusion (04/22/2020).     CURRENT MEDICATIONS       Previous Medications    ALPRAZOLAM (XANAX) 0.5 MG TABLET    Take 0.5 mg by mouth as needed. prn    AMLODIPINE (NORVASC) 5 MG TABLET    Take 1 tablet by mouth daily    ATORVASTATIN (LIPITOR) 10 MG TABLET    Take 10 mg by mouth daily    CALCIUM CARB-CHOLECALCIFEROL (CALCIUM-VITAMIN D) 500-200 MG-UNIT PER TABLET    Take 1 tablet by mouth daily     CITALOPRAM (CELEXA) 40 MG TABLET    Take 1 tablet by mouth daily    DENOSUMAB (PROLIA SC)    Inject into the skin    ONDANSETRON (ZOFRAN ODT) 4 MG DISINTEGRATING TABLET    Take 1 tablet by mouth every 8 hours as needed for Nausea    SULFAMETHOXAZOLE-TRIMETHOPRIM (BACTRIM DS) 800-160 MG PER TABLET    Take 1 tablet by mouth 2 times daily for 3 days       ALLERGIES     is allergic to ciprofloxacin; lisinopril; and codeine. FAMILY HISTORY     is adopted. family history is not on file. She was adopted. SOCIAL HISTORY      reports that she has never smoked. She has never used smokeless tobacco. She reports current alcohol use of about 1.0 standard drinks of alcohol per week. She reports that she does not use drugs. PHYSICAL EXAM       ED Triage Vitals [08/09/20 1515]   BP Temp Temp Source Pulse Resp SpO2 Height Weight   125/89 98.2 °F (36.8 °C) Oral 81 16 98 % 4' 11\" (1.499 m) 133 lb (60.3 kg)       Constitutional: Alert, oriented x3, nontoxic, answering questions appropriately, acting properly for age, in no acute distress   HEENT: Extraocular muscles intact,   Neck: Trachea midline   Cardiovascular: Regular rhythm and rate no S3, S4, or murmurs   Respiratory: Clear to auscultation bilaterally no wheezes, rhonchi, rales, no respiratory distress no tachypnea no retractions no hypoxia  Gastrointestinal: Soft, mild suprapubic tenderness to palpation, nondistended, positive bowel sounds. No rebound, rigidity, or guarding.   No abdominal bruit no pulsatile mass  Musculoskeletal: No extremity pain or swelling   Neurologic: Moving all 4 extremities without difficulty there are no gross focal neurologic deficits   Skin: Warm and dry       DIFFERENTIAL DIAGNOSIS/ MDM:     Check urinalysis. DIAGNOSTIC RESULTS     EKG: All EKG's are interpreted by the Emergency Department Physician who either signs or Co-signs this chart in the absence of a cardiologist.        Not indicated unless otherwise documented above    LABS:  No results found for this visit on 08/09/20. Not indicated unless otherwise documented above    RADIOLOGY:   I reviewed the radiologist interpretations:    No orders to display       Not indicated unless otherwise documented above    EMERGENCY DEPARTMENT COURSE:     The patient was given the following medications:  No orders of the defined types were placed in this encounter. Vitals:   -------------------------  /89   Pulse 81   Temp 98.2 °F (36.8 °C) (Oral)   Resp 16   Ht 4' 11\" (1.499 m)   Wt 60.3 kg (133 lb)   SpO2 98%   BMI 26.86 kg/m²     4:15 PM patient only able to give us enough urine for a culture. She is currently on antibiotics. Dysuria for the past 2 to 3 weeks she did try some Pyridium over-the-counter last night which did not help. Abdomen is soft nonsurgical.  Bedside ultrasound did not show any distention or retaining of urine. Will discharge to follow-up and will notify her of the culture results to see if we need to switch antibiotics. I have reviewed the disposition diagnosis with the patient and or their family/guardian. I have answered their questions and given discharge instructions. They voiced understanding of these instructions and did not have any furtherquestions or complaints. CRITICAL CARE:    None    CONSULTS:    None    PROCEDURES:    None      OARRS Report if indicated             FINAL IMPRESSION      1.  Dysuria          DISPOSITION/PLAN   DISPOSITION Decision To Discharge 08/09/2020 04:17:18 PM        CONDITION ON DISPOSITION: STABLE       PATIENT REFERRED TO:  Mariaa Alvarez MD  [address] [place] [address] [address]    Schedule an appointment as soon as possible for a visit in 1 day        DISCHARGE MEDICATIONS:  New Prescriptions    No medications on file       (Please note that portions of thisnote were completed with a voice recognition program.  Efforts were made to edit the dictations but occasionally words are mis-transcribed.)    Ibrahim DO  Attending Emergency Physician       Luiza Esteban DO  08/09/20 4586

## 2020-08-09 NOTE — TELEPHONE ENCOUNTER
Patient calling concerned about bladder infection that has persisted for 2 weeks. Patient reported pain in urethra, burning with urination, urgency, hesitancy, and bladder spasms. Reports pain to be 5/10 and worse at night. States this pain has been going on for two weeks. Complains of flank pain and bladder spasms. Patient was seen by Dr. Jovon Lutz and was diagnosed with UTI and prescribed Macrobid 2 weeks ago and now started on started Bactrim. Patient has started bactrim 2 days ago. States symptoms have not got any better. Patient has has appointment set up with Dr. Nayeli Almeida, urologist this upcoming week. Advice given per guidelines. Patient states she will go to [facility] urgent care in [facility] for further evaluation. Reason for Disposition   [1] Taking antibiotic > 24 hours for UTI AND [2] flank or lower back pain worsening    Answer Assessment - Initial Assessment Questions  1. ANTIBIOTIC: \"What antibiotic are you taking? \" \"How many times per day? \"      Bactrim    2. DURATION: \"When was the antibiotic started? \"  Started Bactrim yesterday. 3 doses up to date per patient. 3. MAIN SYMPTOM: \"What is the main symptom you are concerned about? \"  Burning and pain with urination. Stating she is having spasms. 4. FEVER: \"Do you have a fever? \" If so, ask: \"What is it, how was it measured, and when did it start? \"  Unsure. patient reports chills. 5. OTHER SYMPTOMS: \"Do you have any other symptoms? \" (e.g., flank pain, vaginal discharge, blood in urine)  Pateint does report lower back pain.     Protocols used: URINARY TRACT INFECTION ON ANTIBIOTIC FOLLOW-UP CALL [facility]

## 2020-08-09 NOTE — ED NOTES
Pt arrives to ER with c/o urinary frequency, bladder spasms, and pain with urination. She states that she was on a round Macrobid antibiotics and has had 3 doses of Bactrim. She states that she is concerned that his symptoms haven't gone away. Pt resting in bed, comfort offered, no concerns no s/s of distress.       Shanon Lazar, RICHARD  08/09/20 9107

## 2020-08-09 NOTE — ED NOTES
Pt instructed to continue to take Bactrim. Pt educated on culture process.        Benjamin Glover RN  08/09/20 7139

## 2020-08-09 NOTE — ED NOTES
Pt provided scant amount of urine. Pt states that she would like to go. Pt educated and states that she will wait.      Starr Lao RN  08/09/20 4460

## 2020-08-09 NOTE — ED NOTES
Lab notified of Dr Blair Castañeda order to run urine culture.      Stephanie Fernández RN  08/09/20 6214

## 2020-08-11 ENCOUNTER — TELEPHONE (OUTPATIENT)
Dept: PRIMARY CARE CLINIC | Age: 75
End: 2020-08-11

## 2020-08-11 LAB
CULTURE: ABNORMAL
Lab: ABNORMAL
SPECIMEN DESCRIPTION: ABNORMAL

## 2020-08-11 RX ORDER — PHENAZOPYRIDINE HYDROCHLORIDE 100 MG/1
100 TABLET, FILM COATED ORAL 3 TIMES DAILY PRN
Qty: 30 TABLET | Refills: 0 | Status: SHIPPED | OUTPATIENT
Start: 2020-08-11 | End: 2021-08-11

## 2020-08-11 NOTE — TELEPHONE ENCOUNTER
Pt called stating that she is still having bladder pressure and increased urinary frequency. She notes that it happens mainly at night, which is keeping her from getting any sleep. She states that she has finished the Bactrim that was prescribed for her. Pt was seen at [facility] ED on 08/09/20 for Dysuria and was advised by them that the Bactrim that she was on was the right medication for her UTI. She can not get into Dr. Prosper Tracy office until 08/21/20. Pt asking what else she can do to help alleviate her symptoms and if she can have a referral to another provider that will get her in sooner. Please advise.

## 2020-08-21 ENCOUNTER — HOSPITAL ENCOUNTER (OUTPATIENT)
Age: 75
Setting detail: SPECIMEN
Discharge: HOME | End: 2020-08-21
Payer: MEDICARE

## 2020-08-21 ENCOUNTER — OFFICE VISIT (OUTPATIENT)
Dept: UROLOGY | Age: 75
End: 2020-08-21
Payer: MEDICARE

## 2020-08-21 VITALS — TEMPERATURE: 98.5 F

## 2020-08-21 LAB
BILIRUBIN, POC: ABNORMAL
BLOOD URINE, POC: ABNORMAL
CLARITY, POC: CLEAR
COLOR, POC: YELLOW
GLUCOSE URINE, POC: ABNORMAL
KETONES, POC: ABNORMAL
LEUKOCYTE EST, POC: ABNORMAL
NITRITE, POC: ABNORMAL
PH, POC: ABNORMAL
PROTEIN, POC: ABNORMAL
SPECIFIC GRAVITY, POC: ABNORMAL
UROBILINOGEN, POC: ABNORMAL

## 2020-08-21 PROCEDURE — 51798 US URINE CAPACITY MEASURE: CPT | Performed by: UROLOGY

## 2020-08-21 PROCEDURE — 1090F PRES/ABSN URINE INCON ASSESS: CPT | Performed by: UROLOGY

## 2020-08-21 PROCEDURE — G8427 DOCREV CUR MEDS BY ELIG CLIN: HCPCS | Performed by: UROLOGY

## 2020-08-21 PROCEDURE — 4040F PNEUMOC VAC/ADMIN/RCVD: CPT | Performed by: UROLOGY

## 2020-08-21 PROCEDURE — 99204 OFFICE O/P NEW MOD 45 MIN: CPT | Performed by: UROLOGY

## 2020-08-21 PROCEDURE — G8417 CALC BMI ABV UP PARAM F/U: HCPCS | Performed by: UROLOGY

## 2020-08-21 PROCEDURE — 81002 URINALYSIS NONAUTO W/O SCOPE: CPT | Performed by: UROLOGY

## 2020-08-21 PROCEDURE — 3017F COLORECTAL CA SCREEN DOC REV: CPT | Performed by: UROLOGY

## 2020-08-21 PROCEDURE — G8399 PT W/DXA RESULTS DOCUMENT: HCPCS | Performed by: UROLOGY

## 2020-08-21 PROCEDURE — 1036F TOBACCO NON-USER: CPT | Performed by: UROLOGY

## 2020-08-21 PROCEDURE — 1123F ACP DISCUSS/DSCN MKR DOCD: CPT | Performed by: UROLOGY

## 2020-08-21 ASSESSMENT — ENCOUNTER SYMPTOMS
COUGH: 0
NAUSEA: 1
SHORTNESS OF BREATH: 0
CONSTIPATION: 1
VOMITING: 0
WHEEZING: 0
EYE PAIN: 0
ABDOMINAL PAIN: 0
COLOR CHANGE: 0
BACK PAIN: 0
EYE REDNESS: 0

## 2020-08-21 NOTE — PROGRESS NOTES
Review of Systems   Constitutional: Negative for appetite change, chills and fever. Eyes: Negative for pain, redness and visual disturbance. Respiratory: Negative for cough, shortness of breath and wheezing. Cardiovascular: Negative for chest pain and leg swelling. Gastrointestinal: Positive for constipation and nausea. Negative for abdominal pain and vomiting. Genitourinary: Positive for difficulty urinating (alot of spasms ), frequency and pelvic pain. Negative for dysuria, flank pain, hematuria, urgency and vaginal discharge. Musculoskeletal: Negative for back pain, joint swelling and myalgias. Skin: Negative for color change, rash and wound. Neurological: Negative for dizziness, tremors and numbness. Hematological: Negative for adenopathy. Does not bruise/bleed easily.          PVR 50ml per us

## 2020-08-21 NOTE — PROGRESS NOTES
would you feel if you were to spend the rest of your life with your urinary condition?: Terrible    Last BUN andcreatinine:  Lab Results   Component Value Date    BUN 20 07/29/2020     Lab Results   Component Value Date    CREATININE 0.89 07/29/2020       Additional Lab/Culture results: none    Reviewed during this Office Visit: none  (results were independently reviewed byphysician and radiology report verified)    PAST MEDICAL, FAMILY AND SOCIAL HISTORY:  Past Medical History:   Diagnosis Date    Anxiety     Arthritis     Cataracts, bilateral     Depression     Diabetes mellitus ([address]     pre diabetic    Hearing loss     Hyperlipidemia     Hypertension     [facility] ([facility]) [address]     Osteoarthritis     Osteoporosis      Past Surgical History:   Procedure Laterality Date    CERVICAL DISCECTOMY  04/22/2020    Dr. Khloe Lujan [address] [facility] [address]  04/22/2020    [facility], Khloe Lujan MD    EYE SURGERY      x3   [address] [facility]  05/2017    ingial    KNEE SURGERY      menicus right 2007    SHOULDER SURGERY      right rotator cuff 2016     Family History   Adopted: Yes     Outpatient Medications Marked as Taking for the 8/21/20 encounter (Office Visit) with Woo Rosales MD   Medication Sig Dispense Refill    ondansetron (ZOFRAN ODT) 4 MG disintegrating tablet Take 1 tablet by mouth every 8 hours as needed for Nausea 12 tablet 0    citalopram (CELEXA) 40 MG tablet Take 1 tablet by mouth daily 30 tablet 0    atorvastatin (LIPITOR) 10 MG tablet Take 10 mg by mouth daily      amLODIPine (NORVASC) 5 MG tablet Take 1 tablet by mouth daily 30 tablet 5    Denosumab (PROLIA SC) Inject into the skin         Ciprofloxacin;  Lisinopril; and Codeine  Social History     Tobacco Use   Smoking Status Never Smoker   Smokeless Tobacco Never Used      (If patient a smoker, smoking cessation counseling offered)   Social History     Substance and Sexual Activity   Alcohol Use Yes    Alcohol/week: 1.0 standard drinks    Types: 1 Shots of liquor per week    Frequency: 2-3 times a week    Drinks per session: 1 or 2    Comment: Started Alc 21 y.o; pt is very vague; in her 45s and 46s, partied more often than now-getting drunk over the wkends--Vodka; since 89, getting drunk 2 x mo; past 2 years-1-1-1/2 shot of whiskey-mixed drink every day. DAVID--1/14/2018; No DUI or DT. REVIEW OF SYSTEMS:  Review of Systems    Physical Exam:    This a 76 y.o. female      Vitals:    08/21/20 0931   Temp: 98.5 °F (36.9 °C)     There is no height or weight on file to calculate BMI. Physical Exam  Constitutional: Patient in no acute distress, ggod grooming, appropriately dressed  Neuro: Alert and oriented to person, place and time. Psych:Mood normal, affect normal  Skin: No rash noted  HEENT: Head: Normocephalic and atraumatic,Conjunctivae and EOM are normal,Nose- normal, Right/Left External Ear: normal, Mouth: Mucosa Moist  Neck: Supple  Lungs: Respiratory effort is normal  Cardiovascular: strong and regular, no lower leg edema  Abdomen: Soft, non-tender, non-distended with no CVA,    Lymphatics: No cervical palpable lymphadenopathy. Bladder non-tender and not distended. Musculoskeletal: Normal gait and station        Assessment and Plan      1. Incomplete emptying of bladder    2. Flank pain    3. Suprapubic pain    4. Other hydronephrosis            Plan:    ct scan given hydro  Urine cx    Prescriptions Ordered:  No orders of the defined types were placed in this encounter. Orders Placed:  Orders Placed This Encounter   Procedures    POCT Urinalysis no Micro    VT MEASUREMENT,POST-VOID RESIDUAL VOLUME BY US,NON-IMAGING            Mathew Ramirez MD    Agree with the ROS entered by the MA.

## 2020-08-22 ENCOUNTER — HOSPITAL ENCOUNTER (OUTPATIENT)
Dept: CT IMAGING | Age: 75
Discharge: HOME | End: 2020-08-24
Payer: MEDICARE

## 2020-08-22 LAB
CULTURE: NORMAL
Lab: NORMAL
SPECIMEN DESCRIPTION: NORMAL

## 2020-08-22 PROCEDURE — 6360000004 HC RX CONTRAST MEDICATION: Performed by: UROLOGY

## 2020-08-22 PROCEDURE — 74178 CT ABD&PLV WO CNTR FLWD CNTR: CPT

## 2020-08-22 PROCEDURE — 2580000003 HC RX 258: Performed by: UROLOGY

## 2020-08-22 RX ORDER — SODIUM CHLORIDE 0.9 % (FLUSH) 0.9 %
10 SYRINGE (ML) INJECTION ONCE
Status: COMPLETED | OUTPATIENT
Start: 2020-08-22 | End: 2020-08-22

## 2020-08-22 RX ORDER — 0.9 % SODIUM CHLORIDE 0.9 %
80 INTRAVENOUS SOLUTION INTRAVENOUS ONCE
Status: COMPLETED | OUTPATIENT
Start: 2020-08-22 | End: 2020-08-22

## 2020-08-22 RX ADMIN — IOVERSOL 120 ML: 741 INJECTION INTRA-ARTERIAL; INTRAVENOUS at 09:37

## 2020-08-22 RX ADMIN — Medication 10 ML: at 09:37

## 2020-08-22 RX ADMIN — SODIUM CHLORIDE 80 ML: 9 INJECTION, SOLUTION INTRAVENOUS at 09:37

## 2020-08-26 ENCOUNTER — OFFICE VISIT (OUTPATIENT)
Dept: NEUROLOGY | Age: 75
End: 2020-08-26
Payer: MEDICARE

## 2020-08-26 VITALS
TEMPERATURE: 98.4 F | WEIGHT: 135.4 LBS | HEART RATE: 90 BPM | DIASTOLIC BLOOD PRESSURE: 77 MMHG | HEIGHT: 59 IN | SYSTOLIC BLOOD PRESSURE: 123 MMHG | BODY MASS INDEX: 27.3 KG/M2

## 2020-08-26 PROCEDURE — G8417 CALC BMI ABV UP PARAM F/U: HCPCS | Performed by: PSYCHIATRY & NEUROLOGY

## 2020-08-26 PROCEDURE — 99214 OFFICE O/P EST MOD 30 MIN: CPT | Performed by: PSYCHIATRY & NEUROLOGY

## 2020-08-26 PROCEDURE — 1090F PRES/ABSN URINE INCON ASSESS: CPT | Performed by: PSYCHIATRY & NEUROLOGY

## 2020-08-26 PROCEDURE — 1036F TOBACCO NON-USER: CPT | Performed by: PSYCHIATRY & NEUROLOGY

## 2020-08-26 PROCEDURE — 4040F PNEUMOC VAC/ADMIN/RCVD: CPT | Performed by: PSYCHIATRY & NEUROLOGY

## 2020-08-26 PROCEDURE — 1123F ACP DISCUSS/DSCN MKR DOCD: CPT | Performed by: PSYCHIATRY & NEUROLOGY

## 2020-08-26 PROCEDURE — 3017F COLORECTAL CA SCREEN DOC REV: CPT | Performed by: PSYCHIATRY & NEUROLOGY

## 2020-08-26 PROCEDURE — G8427 DOCREV CUR MEDS BY ELIG CLIN: HCPCS | Performed by: PSYCHIATRY & NEUROLOGY

## 2020-08-26 PROCEDURE — G8399 PT W/DXA RESULTS DOCUMENT: HCPCS | Performed by: PSYCHIATRY & NEUROLOGY

## 2020-08-26 ASSESSMENT — ENCOUNTER SYMPTOMS
GASTROINTESTINAL NEGATIVE: 1
ALLERGIC/IMMUNOLOGIC NEGATIVE: 1
EYES NEGATIVE: 1
BACK PAIN: 1
RESPIRATORY NEGATIVE: 1

## 2020-08-26 NOTE — PROGRESS NOTES
Subjective:      Patient ID: Fina Ponce is a 76 y.o. female. HPI  Active problem cervical myelopathy from cervical spondylosis having undergone decompression April 2020 with residual postural instability to undergo course of PT for balance and gait retraining . Multiple sclerosis in remission . Memory loss that appear to be in relation to multiple sclerosis and depression. Dizziness with peripheral vestibular component . The condition is she has developed UTI over the last 2 weeks going on ATB having dysuria and urinary hesitation . She has not undergone PT being aprenhesive with COVID . She reports that she is falling last 4 weeks ago while sweeping porch loosing balance . There is baseline imbalance with three falls since April . There is minor right leg giveway feeling on weightbearing . She is not using walker or cane . She reports only occasional positional vertigo. She is on celexa 10 mg with depression with still some low mood . She has multiple sclerosis having been followed by [facility] used to be on avonex changed to copaxone having been off medication for 10 years with feeling that disease was not active . She reports that she was diagnosed in 2003 having at the time imbalance with abnormal MRI of Head and cervical spine having lumbar puncture which was negative  . There were exacerbations with trouble walking getting intermittent IV solumedrol 3 times the last time being over 10 years . Significant medications celexa 10 mg po qd . Testing Head CT normal , March 2015 . MRI of Head bilateral extensive  periventricular subcortical white mater disease with no enhancement . MRI cervical spine with severe spinal narrowing C5-6 with early myelomalacia.  EEG normal  . Treponema pallidum nonreactive , O12 018 , folic acid 31.9, TSH normal , OPAL negative      Past Medical History:   Diagnosis Date    Anxiety     Arthritis     Cataracts, bilateral     Depression     [address] [address]     pre diabetic    Hearing loss     Hyperlipidemia     Hypertension     MS (multiple sclerosis) [address] [address] [facility]     Osteoarthritis     Osteoporosis        Past Surgical History:   Procedure Laterality Date    CERVICAL DISCECTOMY  04/22/2020    Dr. Renetta Lange [address] [facility] [address]  04/22/2020    [facility], Renetta Lange MD    EYE SURGERY      x3    HERNIA REPAIR  05/2017    ingial    KNEE SURGERY      menicus right 2007    SHOULDER SURGERY      right rotator cuff 2016       Family History   Adopted: Yes       Social History     Socioeconomic History    Marital status:      Spouse name: None    Number of children: None    Years of education: None    Highest education level: None   Occupational History    None   Social Needs    Financial resource strain: None    Food insecurity     Worry: None     Inability: None    Transportation needs     Medical: None     Non-medical: None   Tobacco Use    Smoking status: Never Smoker    Smokeless tobacco: Never Used   Substance and Sexual Activity    Alcohol use: Yes     Alcohol/week: 1.0 standard drinks     Types: 1 Shots of liquor per week     Frequency: 2-3 times a week     Drinks per session: 1 or 2     Comment: Started Alc 24 y.o; pt is very vague; in her 45s and 46s, partied more often than now-getting drunk over the wkends--Vodka; since 89, getting drunk 2 x mo; past 2 years-1-1-1/2 shot of whiskey-mixed drink every day. DAVID--1/14/2018;  No DUI or DT.   [place] Drug use: No    Sexual activity: Yes     Partners: Male   Lifestyle    Physical activity     Days per week: None     Minutes per session: None    Stress: None   Relationships    Social connections     Talks on phone: None     Gets together: None     Attends Religion service: None     Active member of club or organization: None     Attends meetings of clubs or organizations: None     Relationship status: None    Intimate partner violence     Fear of current or ex partner: None Emotionally abused: None     Physically abused: None     Forced sexual activity: None   Other Topics Concern    None   Social History Narrative    None       Current Outpatient Medications   Medication Sig Dispense Refill    ondansetron (ZOFRAN ODT) 4 MG disintegrating tablet Take 1 tablet by mouth every 8 hours as needed for Nausea 12 tablet 0    citalopram (CELEXA) 40 MG tablet Take 1 tablet by mouth daily 30 tablet 0    atorvastatin (LIPITOR) 10 MG tablet Take 10 mg by mouth daily      amLODIPine (NORVASC) 5 MG tablet Take 1 tablet by mouth daily 30 tablet 5    Denosumab (PROLIA SC) Inject into the skin      ALPRAZolam (XANAX) 0.5 MG tablet Take 0.5 mg by mouth as needed. prn      phenazopyridine (PYRIDIUM) 100 MG tablet Take 1 tablet by mouth 3 times daily as needed for Pain (Patient not taking: Reported on 8/21/2020) 30 tablet 0    Calcium Carb-Cholecalciferol (CALCIUM-VITAMIN D) 500-200 MG-UNIT per tablet Take 1 tablet by mouth daily        No current facility-administered medications for this visit. Allergies   Allergen Reactions    Ciprofloxacin Hives and Itching    Lisinopril Other (See Comments)    Codeine Other (See Comments)       Review of Systems   Constitutional: Negative. HENT: Positive for tinnitus. Eyes: Negative. Respiratory: Negative. Cardiovascular: Negative. Gastrointestinal: Negative. Endocrine: Negative. Genitourinary: Negative. Musculoskeletal: Positive for back pain, gait problem and neck pain. Skin: Negative. Allergic/Immunologic: Negative. Neurological: Positive for dizziness. Hematological: Negative. Psychiatric/Behavioral: The patient is nervous/anxious.         Objective:   Physical Exam  Vitals:    08/26/20 1607   BP: 123/77   Pulse: 90   Temp: 98.4 °F (36.9 °C)     weight: 135 lb 6.4 oz (61.4 kg)  Supine 141/84 , sitting 139/86 , standing 144/87   Neurological Examination  Constitutional .General exam well groomed   Head/Ears /Nose/Throat: external ear . Normal exam  Neck and thyroid . Normal size. No bruits  Respiratory . Breathsounds clear bilaterally  Cardiovascular: Auscultation of heart with regular rate and rhythm  Musculoskeletal. Muscle bulk and tone normal                                                           Muscle strength  5/5 strength throughout                                                                                No dysmetria or dysdiadokinesis  No tremor   Normal fine motor  Gait imbalance   Orientation Alert and oriented x 3 . WORLD able to spell forwards not backwards . Serial 7 to 86   Attention and concentration normal  Short term memory 3 words out of 3 in one minute   Language process and speech normal . No aphasia   Cranial nerve 2 normal acuety and visual fields  Cranial nerve 3, 4 and 6 . Extraocular muscles are intact . Pupils are equal and reactive   Cranial nerve 5 . Normal strength of masseter and temporalis . Intact corneal reflex. Normal facial sensation  Cranial nerve 7 normal exam   Cranial nerve 8. Grossly intact hearing   Cranial nerve 9 and 10. Symmetric palate elevation   Cranial nerve 11 , 5 out of 5 strength   Cranial Nerve 12 midline tongue . No atrophy  Sensation . Normal proprioception . Intact Vibration . Normal pinprick and light touch   Deep Tendon Reflexes normal  Plantar response flexor bilaterally    Assessment:       Diagnosis Orders   1. Gait instability  PT eval and treat    CT CANE ADJUST/FIXED QUAD/3 PRO   2. Cervical myelopathy (HCC)  PT eval and treat    CT CANE ADJUST/FIXED QUAD/3 PRO   She is to undergo PT with balance therapy to use cane for support at all times       Plan:      Orders Placed This Encounter   Procedures    PT eval and treat     Standing Status:   Future     Standing Expiration Date:   8/26/2021     Scheduling Instructions:      Dx balancee , gait retraining and strengthening .  Assess and treat    CT CANE ADJUST/FIXED QUAD/3 PRO     Standing Status:   Future

## 2020-08-27 ENCOUNTER — OFFICE VISIT (OUTPATIENT)
Dept: UROLOGY | Age: 75
End: 2020-08-27
Payer: MEDICARE

## 2020-08-27 ENCOUNTER — HOSPITAL ENCOUNTER (OUTPATIENT)
Dept: GENERAL RADIOLOGY | Age: 75
Discharge: HOME | End: 2020-08-29
Payer: MEDICARE

## 2020-08-27 ENCOUNTER — HOSPITAL ENCOUNTER (OUTPATIENT)
Age: 75
Discharge: HOME | End: 2020-08-29
Payer: MEDICARE

## 2020-08-27 VITALS — TEMPERATURE: 98.1 F

## 2020-08-27 PROCEDURE — 74018 RADEX ABDOMEN 1 VIEW: CPT

## 2020-08-27 PROCEDURE — G8427 DOCREV CUR MEDS BY ELIG CLIN: HCPCS | Performed by: UROLOGY

## 2020-08-27 PROCEDURE — 1036F TOBACCO NON-USER: CPT | Performed by: UROLOGY

## 2020-08-27 PROCEDURE — G8399 PT W/DXA RESULTS DOCUMENT: HCPCS | Performed by: UROLOGY

## 2020-08-27 PROCEDURE — 1123F ACP DISCUSS/DSCN MKR DOCD: CPT | Performed by: UROLOGY

## 2020-08-27 PROCEDURE — 1090F PRES/ABSN URINE INCON ASSESS: CPT | Performed by: UROLOGY

## 2020-08-27 PROCEDURE — 3017F COLORECTAL CA SCREEN DOC REV: CPT | Performed by: UROLOGY

## 2020-08-27 PROCEDURE — 4040F PNEUMOC VAC/ADMIN/RCVD: CPT | Performed by: UROLOGY

## 2020-08-27 PROCEDURE — 99214 OFFICE O/P EST MOD 30 MIN: CPT | Performed by: UROLOGY

## 2020-08-27 PROCEDURE — G8417 CALC BMI ABV UP PARAM F/U: HCPCS | Performed by: UROLOGY

## 2020-08-27 ASSESSMENT — ENCOUNTER SYMPTOMS
NAUSEA: 0
EYE REDNESS: 0
VOMITING: 0
COUGH: 0
BACK PAIN: 0
EYE PAIN: 0
WHEEZING: 0
COLOR CHANGE: 0
ABDOMINAL PAIN: 0
SHORTNESS OF BREATH: 0

## 2020-08-27 NOTE — PROGRESS NOTES
Review of Systems   Constitutional: Negative for activity change, chills and fever. Eyes: Negative for pain, redness and visual disturbance. Respiratory: Negative for cough, shortness of breath and wheezing. Cardiovascular: Negative for chest pain and leg swelling. Gastrointestinal: Negative for abdominal pain, nausea and vomiting. Genitourinary: Positive for flank pain (intermittent ). Negative for difficulty urinating, dysuria, frequency, hematuria, pelvic pain, vaginal bleeding and vaginal discharge. Musculoskeletal: Negative for back pain, joint swelling and myalgias. Skin: Negative for color change and rash. Neurological: Negative for dizziness, tremors and numbness. Hematological: Negative for adenopathy. Does not bruise/bleed easily.

## 2020-08-27 NOTE — PROGRESS NOTES
[address] [address]  Dept: [address] [facility] Urology Office Note - Established    Patient:  Damien Sousa  YOB: 1945  Date: 8/27/2020    The patient is a 76 y.o. female whopresents today for evaluation of the following problems:   Chief Complaint   Patient presents with    Flank Pain     f/u to discuss CT results       HPI  Here for follow up on CTU for urethral pain. She has Hx of stones, has passed no recent stone. She had pain last on Sunday, CTU was Saturday. She has been painfree as of Monday, her urethral pain has improved but she still has urgency and hesitancy. No fevers, no dysuria. Summary of old records: N/A    Additional History: N/A    Procedures Today: N/A    Urinalysis today:  No results found for this visit on 08/27/20.     Imaging Reviewed during this Office Visit: none  (results were independently reviewed by physician and radiology report verified)    AUA Symptom Score (8/27/2020):                               Last BUN and creatinine:  Lab Results   Component Value Date    BUN 20 07/29/2020     Lab Results   Component Value Date    CREATININE 0.89 07/29/2020       Additional Lab/Culture results: none    PAST MEDICAL, FAMILY AND SOCIAL HISTORY UPDATE:  Past Medical History:   Diagnosis Date    Anxiety     Arthritis     Cataracts, bilateral     Depression     Diabetes mellitus ([address]     pre diabetic    Hearing loss     Hyperlipidemia     Hypertension     MS (multiple sclerosis) ([address]     Osteoarthritis     Osteoporosis      Past Surgical History:   Procedure Laterality Date    CERVICAL DISCECTOMY  04/22/2020    Dr. Natalio patel [facility]  [facility]    EYE SURGERY      x3   [address] [facility]  05/2017    ingial    KNEE SURGERY      menicus right 2007    SHOULDER SURGERY      right rotator cuff 2016 Family History   Adopted: Yes     Outpatient Medications Marked as Taking for the 8/27/20 encounter (Office Visit) with Hien Rmaos MD   Medication Sig Dispense Refill    phenazopyridine (PYRIDIUM) 100 MG tablet Take 1 tablet by mouth 3 times daily as needed for Pain 30 tablet 0    ondansetron (ZOFRAN ODT) 4 MG disintegrating tablet Take 1 tablet by mouth every 8 hours as needed for Nausea 12 tablet 0    citalopram (CELEXA) 40 MG tablet Take 1 tablet by mouth daily 30 tablet 0    Calcium Carb-Cholecalciferol (CALCIUM-VITAMIN D) 500-200 MG-UNIT per tablet Take 1 tablet by mouth daily       atorvastatin (LIPITOR) 10 MG tablet Take 10 mg by mouth daily      amLODIPine (NORVASC) 5 MG tablet Take 1 tablet by mouth daily 30 tablet 5    Denosumab (PROLIA SC) Inject into the skin      ALPRAZolam (XANAX) 0.5 MG tablet Take 0.5 mg by mouth as needed. prn        (All medications reviewed and updated by provider sincelast office visit or hospitalization)   Ciprofloxacin; Lisinopril; and Codeine  Social History     Tobacco Use   Smoking Status Never Smoker   Smokeless Tobacco Never Used      (If patient a smoker, smoking cessation counseling offered)     Social History     Substance and Sexual Activity   Alcohol Use Yes    Alcohol/week: 1.0 standard drinks    Types: 1 Shots of liquor per week    Frequency: 2-3 times a week    Drinks per session: 1 or 2    Comment: Started Alc 21 y.o; pt is very vague; in her 45s and 46s, partied more often than now-getting drunk over the wkends--Vodka; since 89, getting drunk 2 x mo; past 2 years-1-1-1/2 shot of whiskey-mixed drink every day. DAVID--1/14/2018; No DUI or DT. REVIEW OF SYSTEMS:  Review of Systems      Physical Exam:      Vitals:    08/27/20 0954   Temp: 98.1 °F (36.7 °C)     There is no height or weight on file to calculate BMI. Patient is a 76 y.o. female in noacute distress and alert and oriented to person, place and time.   Physical Exam  Constitutional: Patient in no acute distress. Neuro: Alert andoriented to person, place and time. Psych: Mood normal, affect normal  Skin: No rash noted  HEENT: Head: Normocephalic and atraumatic  Conjunctivae and EOM are normal. Pupils are equal, round  Nose: Normal  Right External Ear: Normal; Left External Ear: Normal  Mouth: Mucosa Moist  Neck: Supple  Lungs: Respiratory effort is normal  Cardiovascular: Warm & Pink  Abdomen: Soft, non-tender, non-distended with no CVA,  No flank tenderness,  Or hepatosplenomegaly   Lymphatics: No palpable lymphadenopathy. Bladder non-tender and not distended. Musculoskeletal: Normalgait and station      and Plan      1. Kidney stone    2. Flank pain    3. Hydronephrosis with urinary obstruction due to ureteral calculus           Plan:    cysto left urs laser litho and stent in 2-3 weeks  Return for Surgery. Prescriptions Ordered:  No orders of the defined types were placed in this encounter. Orders Placed:  Orders Placed This Encounter   Procedures    XR ABDOMEN (KUB) (SINGLE AP VIEW)     Standing Status:   Future     Standing Expiration Date:   8/27/2021            Isra Vasquez MD    Agree with the ROS entered by the MA.

## 2020-09-03 NOTE — TELEPHONE ENCOUNTER
Patient calling for refill of Celexa.       Medication active on med list yes      Date of last fill: 7/2/20  verified on 9/3/2020   verified by [facility] LPN      Date of last appointment 8/26/20    Next Visit Date:  11/11/2020

## 2020-09-04 RX ORDER — CITALOPRAM 40 MG/1
40 TABLET ORAL DAILY
Qty: 30 TABLET | Refills: 0 | Status: SHIPPED | OUTPATIENT
Start: 2020-09-04 | End: 2020-10-12

## 2020-09-11 ENCOUNTER — TELEPHONE (OUTPATIENT)
Dept: UROLOGY | Age: 75
End: 2020-09-11

## 2020-09-11 NOTE — PROGRESS NOTES
Pt is due to be scheduled for URS with stent placement at the end of next week. Pt states she has passed a few stones and is no longer in pain. Per Dr Elma Ho. Renal US ordered to determine if surgery is still necessary.

## 2020-09-11 NOTE — TELEPHONE ENCOUNTER
Spoke with patient to schedule surgery. Patient stated she is feeling well and he passed stones but was unsure if she had passed them all. Per Dr. Steiner Money have patient get a Renal US. Left message with patient to get Renal US.

## 2020-09-17 ENCOUNTER — HOSPITAL ENCOUNTER (OUTPATIENT)
Dept: ULTRASOUND IMAGING | Age: 75
Discharge: HOME | End: 2020-09-19
Payer: MEDICARE

## 2020-09-17 PROCEDURE — 76775 US EXAM ABDO BACK WALL LIM: CPT

## 2020-09-18 NOTE — TELEPHONE ENCOUNTER
Given to Taty Rodas to speak with Dr. Eric Guevara. Taty Rodas sent Doctor message thurs 9/17/20, and 9/18/20.  Per Dr. Kim Rodriguez, serum calcium and [place] acid and follow up in office

## 2020-10-12 RX ORDER — CITALOPRAM 40 MG/1
TABLET ORAL
Qty: 30 TABLET | Refills: 0 | Status: SHIPPED | OUTPATIENT
Start: 2020-10-12 | End: 2020-11-17

## 2020-10-12 NOTE — TELEPHONE ENCOUNTER
Pharmacy requesting a  refill of celexa 40mg.       Medication active on med list yes      Date of last prescription 09/04/2020  with 0 refills verified on 10/12/2020    verified by VICK BUCHANAN      Date of last appointment 08/26/2020    Next Visit Date:  11/11/2020

## 2020-11-12 ENCOUNTER — OFFICE VISIT (OUTPATIENT)
Dept: UROLOGY | Age: 75
End: 2020-11-12
Payer: COMMERCIAL

## 2020-11-12 ENCOUNTER — HOSPITAL ENCOUNTER (OUTPATIENT)
Age: 75
Setting detail: SPECIMEN
Discharge: HOME | End: 2020-11-12
Payer: MEDICARE

## 2020-11-12 VITALS
SYSTOLIC BLOOD PRESSURE: 108 MMHG | WEIGHT: 133 LBS | HEIGHT: 59 IN | BODY MASS INDEX: 26.81 KG/M2 | TEMPERATURE: 98.1 F | DIASTOLIC BLOOD PRESSURE: 86 MMHG | HEART RATE: 77 BPM

## 2020-11-12 PROCEDURE — 1036F TOBACCO NON-USER: CPT | Performed by: UROLOGY

## 2020-11-12 PROCEDURE — 4040F PNEUMOC VAC/ADMIN/RCVD: CPT | Performed by: UROLOGY

## 2020-11-12 PROCEDURE — G8484 FLU IMMUNIZE NO ADMIN: HCPCS | Performed by: UROLOGY

## 2020-11-12 PROCEDURE — 1090F PRES/ABSN URINE INCON ASSESS: CPT | Performed by: UROLOGY

## 2020-11-12 PROCEDURE — 1123F ACP DISCUSS/DSCN MKR DOCD: CPT | Performed by: UROLOGY

## 2020-11-12 PROCEDURE — G8399 PT W/DXA RESULTS DOCUMENT: HCPCS | Performed by: UROLOGY

## 2020-11-12 PROCEDURE — 3017F COLORECTAL CA SCREEN DOC REV: CPT | Performed by: UROLOGY

## 2020-11-12 PROCEDURE — 99214 OFFICE O/P EST MOD 30 MIN: CPT | Performed by: UROLOGY

## 2020-11-12 PROCEDURE — G8417 CALC BMI ABV UP PARAM F/U: HCPCS | Performed by: UROLOGY

## 2020-11-12 PROCEDURE — G8427 DOCREV CUR MEDS BY ELIG CLIN: HCPCS | Performed by: UROLOGY

## 2020-11-12 RX ORDER — ATORVASTATIN CALCIUM 20 MG/1
20 TABLET, FILM COATED ORAL DAILY
COMMUNITY
Start: 2020-09-10

## 2020-11-12 ASSESSMENT — ENCOUNTER SYMPTOMS
BACK PAIN: 0
EYE PAIN: 0
VOMITING: 0
EYE REDNESS: 0
SHORTNESS OF BREATH: 0
ABDOMINAL PAIN: 0
NAUSEA: 0
CONSTIPATION: 0
WHEEZING: 0
COUGH: 0

## 2020-11-12 NOTE — PROGRESS NOTES
[address] [address]  Dept: [address] [facility] Urology Office Note - Established    Patient:  Curley Bumpers  YOB: 1945  Date: 11/12/2020    The patient is a 76 y.o. female whopresents today for evaluation of the following problems:   Chief Complaint   Patient presents with    Results     lab and Litholink result s       HPI  Here for follow up on [facility]. Has no hematuria. no flank pain. She passed a stone since her firsst visit. Summary of old records: N/A    Additional History: N/A    Procedures Today: N/A    Urinalysis today:  No results found for this visit on 11/12/20. Imaging Reviewed during this Office Visit:   EXAMINATION:    CT UROGRAM         8/22/2020 9:21 am         TECHNIQUE:    CT of the abdomen and pelvis was performed before and after the    administration of intravenous contrast as per CT urogram protocol. Multiplanar reformatted images as well as MIP urogram images are provided for    review. Dose modulation, iterative reconstruction, and/or weight based    adjustment of the mA/kV was utilized to reduce the radiation dose to as low    as reasonably achievable.         COMPARISON:    CT abdomen pelvis 08/03/2018         HISTORY:    ORDERING SYSTEM PROVIDED HISTORY: Incomplete emptying of bladder    TECHNOLOGIST PROVIDED HISTORY:    hydronephrosis    Reason for Exam: Suprapubic pain; hydronephrosis;  Incomplete emptying of    bladder  pt states she has been having abd pain with the feeling of    incomplete emptying of her bladder x4 weeks    Acuity: Acute    Type of Exam: Initial    Relevant Medical/Surgical History: surg - hernia repair         FINDINGS:    Lower Chest: The visualized heart and lungs show no acute abnormalities.         Kidneys and Urinary Tract: Demonstration of several bilateral nonobstructing    renal calculi more pronounced on the left largest is 4 all  INTERMITTENCY: How often have you found you stopped and started again several times when you urinated?: Less than Half the time  URGENCY: How often have you found it difficult to postpone urination?: About Half the time  WEAK STREAM: How often have you had a weak urinary stream?: Less than Half the time  STRAINING: How often have you had to strain to start  urination?: About Half the time  NOCTURIA: How many times did you typically get up at night to uriniate?: 3 Times  TOTAL I-PSS SCORE[de-identified] 13       Last BUN and creatinine:  Lab Results   Component Value Date    BUN 20 07/29/2020     Lab Results   Component Value Date    CREATININE 0.89 07/29/2020       Additional Lab/Culture results:  PAST MEDICAL, FAMILY AND SOCIAL HISTORY UPDATE:  Past Medical History:   Diagnosis Date    Anxiety     Arthritis     Cataracts, bilateral     Depression     Diabetes mellitus ([address]     pre diabetic    Hearing loss     Hyperlipidemia     Hypertension     MS (multiple sclerosis) ([address]     Osteoarthritis     Osteoporosis      Past Surgical History:   Procedure Laterality Date    CERVICAL DISCECTOMY  04/22/2020    Dr. Hossein Rubio a [facility] [address]  04/22/2020    [facility], Hossein Rubio MD    EYE SURGERY      x3   [address] [facility]  05/2017    ingial    KNEE SURGERY      menicus right 2007    SHOULDER SURGERY      right rotator cuff 2016     Family History   Adopted: Yes     Outpatient Medications Marked as Taking for the 11/12/20 encounter (Office Visit) with Darrius Bernstein MD   Medication Sig Dispense Refill    atorvastatin (LIPITOR) 20 MG tablet Take 20 mg by mouth daily      citalopram (CELEXA) 40 MG tablet TAKE 1 TABLET ONCE DAILY  30 tablet 0    phenazopyridine (PYRIDIUM) 100 MG tablet Take 1 tablet by mouth 3 times daily as needed for Pain 30 tablet 0    ondansetron (ZOFRAN ODT) 4 MG disintegrating tablet Take 1 tablet by mouth every 8 hours as needed for Nausea 12 tablet 0    Calcium Carb-Cholecalciferol (CALCIUM-VITAMIN D) 500-200 MG-UNIT per tablet Take 1 tablet by mouth daily       atorvastatin (LIPITOR) 10 MG tablet Take 10 mg by mouth daily      amLODIPine (NORVASC) 5 MG tablet Take 1 tablet by mouth daily 30 tablet 5    Denosumab (PROLIA SC) Inject into the skin      ALPRAZolam (XANAX) 0.5 MG tablet Take 0.5 mg by mouth as needed. prn        (All medications reviewed and updated by provider sincelast office visit or hospitalization)   Ciprofloxacin; Lisinopril; and Codeine  Social History     Tobacco Use   Smoking Status Never Smoker   Smokeless Tobacco Never Used      (If patient a smoker, smoking cessation counseling offered)     Social History     Substance and Sexual Activity   Alcohol Use Yes    Alcohol/week: 1.0 standard drinks    Types: 1 Shots of liquor per week    Frequency: 2-3 times a week    Drinks per session: 1 or 2    Comment: Started Alc 21 y.o; pt is very vague; in her 45s and 46s, partied more often than now-getting drunk over the wkends--Vodka; since 89, getting drunk 2 x mo; past 2 years-1-1-1/2 shot of whiskey-mixed drink every day. DAVID--1/14/2018; No DUI or DT. REVIEW OF SYSTEMS:  Review of Systems      Physical Exam:      Vitals:    11/12/20 1128   BP: 108/86   Pulse: 77   Temp: 98.1 °F (36.7 °C)     Body mass index is 26.86 kg/m². Patient is a 76 y.o. female in noacute distress and alert and oriented to person, place and time. Physical Exam  Constitutional: Patient in no acute distress. Neuro: Alert andoriented to person, place and time.   Psych: Mood normal, affect normal  Skin: warm, pink, No rash noted  HEENT: Head: Normocephalic and atraumatic  Conjunctivae and EOM are normal. Pupils are equal, round  Nose: Normal  Right External Ear: Normal; Left External Ear: Normal  Mouth: Mucosa Moist  Neck: Supple  Lungs: Respiratory effort is normal  Cardiovascular: Warm & Pink  Abdomen: Soft, non-tender, non-distended   Bladder non-tender and not distended. Musculoskeletal: Normal gait and station      and Plan      1. Kidney stone           Plan:    prefers to not repeat 24 hour urine, discussed litholink findings and recommendations  Increased water with lemon throughout the day    repeat renal US in 1 yr    Watch bladder irritants    Call with questions or concerns sooner. Return in about 1 year (around 11/12/2021) for US renal .    Prescriptions Ordered:  No orders of the defined types were placed in this encounter. Orders Placed:  Orders Placed This Encounter   Procedures    Stone Analysis     Standing Status:   Future     Standing Expiration Date:   11/12/2021    US RENAL COMPLETE     Standing Status:   Future     Standing Expiration Date:   11/12/2021            Jose Putnam MD    Reviewed and Agree with the ROS entered by the MA.

## 2020-11-16 ENCOUNTER — HOSPITAL ENCOUNTER (OUTPATIENT)
Dept: ULTRASOUND IMAGING | Age: 75
Discharge: HOME | End: 2020-11-18
Payer: MEDICARE

## 2020-11-16 PROCEDURE — 76770 US EXAM ABDO BACK WALL COMP: CPT

## 2020-11-17 LAB
STONE COMPOSITION: NORMAL
STONE DESCRIPTION: NORMAL
STONE MASS: NORMAL MG
STONE NUMBER: NORMAL
STONE SIZE: NORMAL MM

## 2020-11-17 RX ORDER — CITALOPRAM 40 MG/1
TABLET ORAL
Qty: 30 TABLET | Refills: 0 | Status: SHIPPED | OUTPATIENT
Start: 2020-11-17 | End: 2021-03-26

## 2020-11-17 NOTE — TELEPHONE ENCOUNTER
Pharmacy requesting refill of Citalopram.      Medication active on med list yes      Date of last fill: 10/12/2020    verified on 11/17/2020    verified by Jason Bryson LPN      Date of last appointment 8/26/2020    Next Visit Date:  11/20/2020

## 2020-11-20 ENCOUNTER — OFFICE VISIT (OUTPATIENT)
Dept: NEUROLOGY | Age: 75
End: 2020-11-20
Payer: COMMERCIAL

## 2020-11-20 VITALS
DIASTOLIC BLOOD PRESSURE: 69 MMHG | TEMPERATURE: 97.9 F | BODY MASS INDEX: 28.18 KG/M2 | WEIGHT: 139.8 LBS | SYSTOLIC BLOOD PRESSURE: 117 MMHG | HEART RATE: 73 BPM | HEIGHT: 59 IN

## 2020-11-20 PROCEDURE — 1036F TOBACCO NON-USER: CPT | Performed by: PSYCHIATRY & NEUROLOGY

## 2020-11-20 PROCEDURE — G8399 PT W/DXA RESULTS DOCUMENT: HCPCS | Performed by: PSYCHIATRY & NEUROLOGY

## 2020-11-20 PROCEDURE — 1090F PRES/ABSN URINE INCON ASSESS: CPT | Performed by: PSYCHIATRY & NEUROLOGY

## 2020-11-20 PROCEDURE — 99214 OFFICE O/P EST MOD 30 MIN: CPT | Performed by: PSYCHIATRY & NEUROLOGY

## 2020-11-20 PROCEDURE — G8484 FLU IMMUNIZE NO ADMIN: HCPCS | Performed by: PSYCHIATRY & NEUROLOGY

## 2020-11-20 PROCEDURE — 3017F COLORECTAL CA SCREEN DOC REV: CPT | Performed by: PSYCHIATRY & NEUROLOGY

## 2020-11-20 PROCEDURE — G8417 CALC BMI ABV UP PARAM F/U: HCPCS | Performed by: PSYCHIATRY & NEUROLOGY

## 2020-11-20 PROCEDURE — 4040F PNEUMOC VAC/ADMIN/RCVD: CPT | Performed by: PSYCHIATRY & NEUROLOGY

## 2020-11-20 PROCEDURE — G8427 DOCREV CUR MEDS BY ELIG CLIN: HCPCS | Performed by: PSYCHIATRY & NEUROLOGY

## 2020-11-20 PROCEDURE — 1123F ACP DISCUSS/DSCN MKR DOCD: CPT | Performed by: PSYCHIATRY & NEUROLOGY

## 2020-11-20 RX ORDER — CITALOPRAM 20 MG/1
20 TABLET ORAL DAILY
Qty: 90 TABLET | Refills: 1 | Status: SHIPPED | OUTPATIENT
Start: 2020-11-20 | End: 2021-03-26

## 2020-11-20 ASSESSMENT — ENCOUNTER SYMPTOMS
GASTROINTESTINAL NEGATIVE: 1
RESPIRATORY NEGATIVE: 1
EYES NEGATIVE: 1
ALLERGIC/IMMUNOLOGIC NEGATIVE: 1
BACK PAIN: 1

## 2020-11-20 NOTE — PROGRESS NOTES
Subjective:      Patient ID: Roz Hanks is a 76 y.o. female. HPI  Active problem cervical myelopathy from cervical spondylosis having undergone decompression April 2020 with residual postural instability . Multiple sclerosis in remission . Memory loss that appear to be in relation to multiple sclerosis and depression. Dizziness with peripheral vestibular component . The condition is she reports that she is having dizziness described as postional vertgo . This will be triggered with movement reporting with dizziness to be more off balance . She is undergoing balance therapy . There have been no falls since last seen . She is on celexa 40 mg with depression . She has multiple sclerosis having been followed by [facility] used to be on avonex changed to copaxone having been off medication for 10 years with feeling that disease was not active . She reports that she was diagnosed in 2003 having at the time imbalance with abnormal MRI of Head and cervical spine having lumbar puncture which was negative  . There were exacerbations with trouble walking getting intermittent IV solumedrol 3 times the last time being over 10 years . Significant medications celexa 40 mg po qd . Testing Head CT normal , March 2015 . MRI of Head bilateral extensive  periventricular subcortical white matter disease with no enhancement . MRI cervical spine with severe spinal narrowing C5-6 with early myelomalacia.  EEG normal  . Treponema pallidum nonreactive , A38 634 , folic acid 18.6, TSH normal , OPAL negative      Past Medical History:   Diagnosis Date    Anxiety     Arthritis     Cataracts, bilateral     Depression     Diabetes mellitus ([address]     pre diabetic    Hearing loss     Hyperlipidemia     Hypertension     MS (multiple sclerosis) ([address]     Osteoarthritis     Osteoporosis        Past Surgical History:   Procedure Laterality Date    CERVICAL DISCECTOMY  04/22/2020    Dr. Ruiz Colin a [address] [place] [address] FUSION  04/22/2020    [facility], Santy Redd MD    EYE SURGERY      x3    HERNIA REPAIR  05/2017    ingial    KNEE SURGERY      menicus right 2007    SHOULDER SURGERY      right rotator cuff 2016       Family History   Adopted: Yes       Social History     Socioeconomic History    Marital status:      Spouse name: None    Number of children: None    Years of education: None    Highest education level: None   Occupational History    None   Social Needs    Financial resource strain: None    Food insecurity     Worry: None     Inability: None    Transportation needs     Medical: None     Non-medical: None   Tobacco Use    Smoking status: Never Smoker    Smokeless tobacco: Never Used   Substance and Sexual Activity    Alcohol use: Yes     Alcohol/week: 1.0 standard drinks     Types: 1 Shots of liquor per week     Frequency: 2-3 times a week     Drinks per session: 1 or 2     Comment: Started Alc 24 y.o; pt is very vague; in her 45s and 46s, partied more often than now-getting drunk over the wkends--Vodka; since 89, getting drunk 2 x mo; past 2 years-1-1-1/2 shot of whiskey-mixed drink every day. DAVID--1/14/2018;  No DUI or [facility] Drug use: No    Sexual activity: Yes     Partners: Male   Lifestyle    Physical activity     Days per week: None     Minutes per session: None    Stress: None   Relationships    Social connections     Talks on phone: None     Gets together: None     Attends Restorationism service: None     Active member of club or organization: None     Attends meetings of clubs or organizations: None     Relationship status: None    Intimate partner violence     Fear of current or ex partner: None     Emotionally abused: None     Physically abused: None     Forced sexual activity: None   Other Topics Concern    None   Social History Narrative    None       Current Outpatient Medications   Medication Sig Dispense Refill    citalopram (CELEXA) 20 MG tablet Take 1 tablet by mouth daily 90 tablet 1    citalopram (CELEXA) 40 MG tablet TAKE ONE TABLET BY MOUTH ONE TIME DAILY  30 tablet 0    ondansetron (ZOFRAN ODT) 4 MG disintegrating tablet Take 1 tablet by mouth every 8 hours as needed for Nausea 12 tablet 0    Calcium Carb-Cholecalciferol (CALCIUM-VITAMIN D) 500-200 MG-UNIT per tablet Take 1 tablet by mouth daily       atorvastatin (LIPITOR) 10 MG tablet Take 10 mg by mouth daily      amLODIPine (NORVASC) 5 MG tablet Take 1 tablet by mouth daily 30 tablet 5    Denosumab (PROLIA SC) Inject into the skin      ALPRAZolam (XANAX) 0.5 MG tablet Take 0.5 mg by mouth as needed. prn      atorvastatin (LIPITOR) 20 MG tablet Take 20 mg by mouth daily      phenazopyridine (PYRIDIUM) 100 MG tablet Take 1 tablet by mouth 3 times daily as needed for Pain (Patient not taking: Reported on 11/20/2020) 30 tablet 0     No current facility-administered medications for this visit. Allergies   Allergen Reactions    Ciprofloxacin Hives and Itching    Lisinopril Other (See Comments)    Codeine Other (See Comments)       Review of Systems   Constitutional: Negative. HENT: Positive for tinnitus. Eyes: Negative. Respiratory: Negative. Cardiovascular: Negative. Gastrointestinal: Negative. Endocrine: Negative. Genitourinary: Negative. Musculoskeletal: Positive for back pain, gait problem and neck pain. Skin: Negative. Allergic/Immunologic: Negative. Neurological: Positive for dizziness. Hematological: Negative. Psychiatric/Behavioral: The patient is nervous/anxious. Objective:   Physical Exam  Vitals:    11/20/20 1334   BP: 117/69   Pulse: 73   Temp: 97.9 °F (36.6 °C)     weight: 139 lb 12.8 oz (63.4 kg)  Neurological Examination  Constitutional .General exam well groomed   Head/Ears /Nose/Throat: external ear . Normal exam  Neck and thyroid . Normal size. No bruits  Respiratory . Breathsounds clear bilaterally  Cardiovascular:  Auscultation of heart with regular rate and rhythm  Musculoskeletal. Muscle bulk and tone normal                                                           Muscle strength  5/5 strength throughout                                                                                No dysmetria or dysdiadokinesis  No tremor   Normal fine motor  Gait imbalance   Orientation Alert and oriented x 3 . WORLD able to spell forwards not backwards . Serial 7 to 86   Attention and concentration normal  Short term memory 3 words out of 3 in one minute   Language process and speech normal . No aphasia   Cranial nerve 2 normal acuety and visual fields  Cranial nerve 3, 4 and 6 . Extraocular muscles are intact . Pupils are equal and reactive   Cranial nerve 5 . Normal strength of masseter and temporalis . Intact corneal reflex. Normal facial sensation  Cranial nerve 7 normal exam   Cranial nerve 8. Grossly intact hearing   Cranial nerve 9 and 10. Symmetric palate elevation   Cranial nerve 11 , 5 out of 5 strength   Cranial Nerve 12 midline tongue . No atrophy  Sensation . Normal proprioception . Intact Vibration . Normal pinprick and light touch   Deep Tendon Reflexes normal  Plantar response flexor bilaterally    Assessment:       Diagnosis Orders   1. Benign paroxysmal positional vertigo, unspecified laterality  PT vestibular rehab   2. Multiple sclerosis ([address]     3. Cervical myelopathy ([address]     Feel greater dizziness complaints are from benign positional vertigo to undergo vestibular rehabilitation .  Will lower celexa to 20 mg po qd at her request      Plan:      Orders Placed This Encounter   Procedures    PT vestibular rehab     Standing Status:   Future     Standing Expiration Date:   11/20/2021     Scheduling Instructions:      Dx BPPV  Assess and treat            Gracie Hill MD

## 2021-03-26 ENCOUNTER — APPOINTMENT (OUTPATIENT)
Dept: CT IMAGING | Age: 76
End: 2021-03-26
Payer: MEDICARE

## 2021-03-26 ENCOUNTER — HOSPITAL ENCOUNTER (EMERGENCY)
Age: 76
Discharge: HOME | End: 2021-03-26
Attending: EMERGENCY MEDICINE
Payer: MEDICARE

## 2021-03-26 VITALS
HEART RATE: 70 BPM | SYSTOLIC BLOOD PRESSURE: 136 MMHG | DIASTOLIC BLOOD PRESSURE: 83 MMHG | WEIGHT: 135 LBS | RESPIRATION RATE: 14 BRPM | BODY MASS INDEX: 27.21 KG/M2 | HEIGHT: 59 IN | OXYGEN SATURATION: 98 % | TEMPERATURE: 99.2 F

## 2021-03-26 DIAGNOSIS — R10.13 ABDOMINAL PAIN, EPIGASTRIC: Primary | ICD-10-CM

## 2021-03-26 LAB
ABSOLUTE EOS #: 0.2 K/UL (ref 0–0.4)
ABSOLUTE IMMATURE GRANULOCYTE: ABNORMAL K/UL (ref 0–0.3)
ABSOLUTE LYMPH #: 0.7 K/UL (ref 1–4.8)
ABSOLUTE MONO #: 0.6 K/UL (ref 0.1–1.2)
ALBUMIN SERPL-MCNC: 4.2 G/DL (ref 3.5–5.2)
ALBUMIN/GLOBULIN RATIO: 1.2 (ref 1–2.5)
ALP BLD-CCNC: 84 U/L (ref 35–104)
ALT SERPL-CCNC: 11 U/L (ref 5–33)
ANION GAP SERPL CALCULATED.3IONS-SCNC: 12 MMOL/L (ref 9–17)
AST SERPL-CCNC: 18 U/L
BASOPHILS # BLD: 1 % (ref 0–2)
BASOPHILS ABSOLUTE: 0 K/UL (ref 0–0.2)
BILIRUB SERPL-MCNC: 0.56 MG/DL (ref 0.3–1.2)
BILIRUBIN DIRECT: 0.14 MG/DL
BILIRUBIN, INDIRECT: 0.42 MG/DL (ref 0–1)
BUN BLDV-MCNC: 19 MG/DL (ref 8–23)
BUN/CREAT BLD: ABNORMAL (ref 9–20)
CALCIUM SERPL-MCNC: 10.5 MG/DL (ref 8.6–10.4)
CHLORIDE BLD-SCNC: 104 MMOL/L (ref 98–107)
CO2: 25 MMOL/L (ref 20–31)
CREAT SERPL-MCNC: 0.9 MG/DL (ref 0.5–0.9)
DIFFERENTIAL TYPE: ABNORMAL
EOSINOPHILS RELATIVE PERCENT: 5 % (ref 1–4)
GFR AFRICAN AMERICAN: >60 ML/MIN
GFR NON-AFRICAN AMERICAN: >60 ML/MIN
GFR SERPL CREATININE-BSD FRML MDRD: ABNORMAL ML/MIN/{1.73_M2}
GFR SERPL CREATININE-BSD FRML MDRD: ABNORMAL ML/MIN/{1.73_M2}
GLOBULIN: NORMAL G/DL (ref 1.5–3.8)
GLUCOSE BLD-MCNC: 102 MG/DL (ref 70–99)
HCT VFR BLD CALC: 42.6 % (ref 36–46)
HEMOGLOBIN: 14.3 G/DL (ref 12–16)
IMMATURE GRANULOCYTES: ABNORMAL %
LIPASE: 117 U/L (ref 13–60)
LYMPHOCYTES # BLD: 20 % (ref 24–44)
MCH RBC QN AUTO: 31.4 PG (ref 26–34)
MCHC RBC AUTO-ENTMCNC: 33.5 G/DL (ref 31–37)
MCV RBC AUTO: 93.9 FL (ref 80–100)
MONOCYTES # BLD: 17 % (ref 2–11)
NRBC AUTOMATED: ABNORMAL PER 100 WBC
PDW BLD-RTO: 15.1 % (ref 12.5–15.4)
PLATELET # BLD: 248 K/UL (ref 140–450)
PLATELET ESTIMATE: ABNORMAL
PMV BLD AUTO: 8 FL (ref 6–12)
POTASSIUM SERPL-SCNC: 3.8 MMOL/L (ref 3.7–5.3)
RBC # BLD: 4.54 M/UL (ref 4–5.2)
RBC # BLD: ABNORMAL 10*6/UL
SEG NEUTROPHILS: 57 % (ref 36–66)
SEGMENTED NEUTROPHILS ABSOLUTE COUNT: 2 K/UL (ref 1.8–7.7)
SODIUM BLD-SCNC: 141 MMOL/L (ref 135–144)
TOTAL PROTEIN: 7.8 G/DL (ref 6.4–8.3)
WBC # BLD: 3.5 K/UL (ref 3.5–11)
WBC # BLD: ABNORMAL 10*3/UL

## 2021-03-26 PROCEDURE — 83690 ASSAY OF LIPASE: CPT

## 2021-03-26 PROCEDURE — 2580000003 HC RX 258: Performed by: EMERGENCY MEDICINE

## 2021-03-26 PROCEDURE — 80076 HEPATIC FUNCTION PANEL: CPT

## 2021-03-26 PROCEDURE — 99284 EMERGENCY DEPT VISIT MOD MDM: CPT

## 2021-03-26 PROCEDURE — 85025 COMPLETE CBC W/AUTO DIFF WBC: CPT

## 2021-03-26 PROCEDURE — 80048 BASIC METABOLIC PNL TOTAL CA: CPT

## 2021-03-26 PROCEDURE — 6360000004 HC RX CONTRAST MEDICATION: Performed by: EMERGENCY MEDICINE

## 2021-03-26 PROCEDURE — 36415 COLL VENOUS BLD VENIPUNCTURE: CPT

## 2021-03-26 PROCEDURE — 74177 CT ABD & PELVIS W/CONTRAST: CPT

## 2021-03-26 RX ORDER — 0.9 % SODIUM CHLORIDE 0.9 %
1000 INTRAVENOUS SOLUTION INTRAVENOUS ONCE
Status: COMPLETED | OUTPATIENT
Start: 2021-03-26 | End: 2021-03-26

## 2021-03-26 RX ORDER — ONDANSETRON 4 MG/1
4 TABLET, FILM COATED ORAL EVERY 6 HOURS PRN
Qty: 10 TABLET | Refills: 0 | Status: SHIPPED | OUTPATIENT
Start: 2021-03-26

## 2021-03-26 RX ORDER — 0.9 % SODIUM CHLORIDE 0.9 %
80 INTRAVENOUS SOLUTION INTRAVENOUS ONCE
Status: COMPLETED | OUTPATIENT
Start: 2021-03-26 | End: 2021-03-26

## 2021-03-26 RX ORDER — PAROXETINE HYDROCHLORIDE 40 MG/1
40 TABLET, FILM COATED ORAL EVERY MORNING
COMMUNITY
End: 2021-10-19

## 2021-03-26 RX ORDER — PANTOPRAZOLE SODIUM 40 MG/1
40 TABLET, DELAYED RELEASE ORAL DAILY
Qty: 30 TABLET | Refills: 0 | Status: SHIPPED | OUTPATIENT
Start: 2021-03-26 | End: 2021-10-19

## 2021-03-26 RX ORDER — SODIUM CHLORIDE 0.9 % (FLUSH) 0.9 %
10 SYRINGE (ML) INJECTION PRN
Status: DISCONTINUED | OUTPATIENT
Start: 2021-03-26 | End: 2021-03-26 | Stop reason: HOSPADM

## 2021-03-26 RX ADMIN — SODIUM CHLORIDE 80 ML: 9 INJECTION, SOLUTION INTRAVENOUS at 16:16

## 2021-03-26 RX ADMIN — IOPAMIDOL 75 ML: 755 INJECTION, SOLUTION INTRAVENOUS at 16:17

## 2021-03-26 RX ADMIN — SODIUM CHLORIDE, PRESERVATIVE FREE 10 ML: 5 INJECTION INTRAVENOUS at 16:17

## 2021-03-26 RX ADMIN — SODIUM CHLORIDE 1000 ML: 9 INJECTION, SOLUTION INTRAVENOUS at 16:06

## 2021-03-26 ASSESSMENT — ENCOUNTER SYMPTOMS
DIARRHEA: 0
ABDOMINAL PAIN: 1
BLOOD IN STOOL: 0
VOMITING: 0
SHORTNESS OF BREATH: 0

## 2021-03-26 ASSESSMENT — PAIN SCALES - GENERAL: PAINLEVEL_OUTOF10: 5

## 2021-03-26 ASSESSMENT — PAIN DESCRIPTION - DESCRIPTORS: DESCRIPTORS: DULL

## 2021-03-26 ASSESSMENT — PAIN DESCRIPTION - ORIENTATION: ORIENTATION: MID;UPPER

## 2021-03-26 NOTE — ED PROVIDER NOTES
[address] [facility]  EMERGENCY DEPARTMENT ENCOUNTER      Pt Name: Juan Nava  MRN: [id number]  [place] 1945  Date of evaluation: 3/26/2021  Provider: Elías Dunbar MD    CHIEF COMPLAINT     Chief Complaint   Patient presents with    Abdominal Pain         HISTORY OF PRESENT ILLNESS   (Location/Symptom, Timing/Onset, Context/Setting,Quality, Duration, Modifying Factors, Severity)  Note limiting factors. Juan Nava is a 68 y.o. female who presents to the emergency department with a 5-day history of epigastric pain that is nonradiating and is worse at night. She denies vomiting. She has had hiatal hernia repair in the past.  She was started on an unnamed antidepressant a week ago. She does not report vomiting or diarrhea. She has had colonoscopy in the past and states it was normal.    The history is provided by the patient and medical records. Nursing Notes werereviewed. REVIEW OF SYSTEMS    (2-9 systems for level 4, 10 or more for level 5)     Review of Systems   Constitutional: Negative for fever. Respiratory: Negative for shortness of breath. Gastrointestinal: Positive for abdominal pain. Negative for blood in stool, diarrhea and vomiting. All other systems reviewed and are negative. Except as noted above the remainder of the review of systems was reviewed and negative.        PAST MEDICAL HISTORY     Past Medical History:   Diagnosis Date    Anxiety     Arthritis     Cataracts, bilateral     Depression     Diabetes mellitus ([address]     pre diabetic    Hearing loss     Hyperlipidemia     Hypertension     MS (multiple sclerosis) ([address]     Osteoarthritis     Osteoporosis          SURGICALHISTORY       Past Surgical History:   Procedure Laterality Date    CERVICAL DISCECTOMY  04/22/2020    Dr. Treasure Kemp [facility]  04/22/2020    [facility] [place] Treasure Kemp [place]    EYE SURGERY      x3    HERNIA REPAIR  05/2017    lyn    KNEE SURGERY      menicus right 2007    SHOULDER SURGERY      right rotator cuff 2016         CURRENT MEDICATIONS       Previous Medications    ALPRAZOLAM (XANAX) 0.5 MG TABLET    Take 0.5 mg by mouth as needed. prn    AMLODIPINE (NORVASC) 5 MG TABLET    Take 1 tablet by mouth daily    ATORVASTATIN (LIPITOR) 20 MG TABLET    Take 20 mg by mouth daily    CALCIUM CARB-CHOLECALCIFEROL (CALCIUM-VITAMIN D) 500-200 MG-UNIT PER TABLET    Take 1 tablet by mouth daily     DENOSUMAB (PROLIA SC)    Inject into the skin    ONDANSETRON (ZOFRAN ODT) 4 MG DISINTEGRATING TABLET    Take 1 tablet by mouth every 8 hours as needed for Nausea    PAROXETINE (PAXIL) 40 MG TABLET    Take 40 mg by mouth every morning    PHENAZOPYRIDINE (PYRIDIUM) 100 MG TABLET    Take 1 tablet by mouth 3 times daily as needed for Pain       ALLERGIES     Ciprofloxacin, Lisinopril, and Codeine    FAMILY HISTORY       Family History   Adopted: Yes          SOCIAL HISTORY       Social History     Socioeconomic History    Marital status:      Spouse name: None    Number of children: None    Years of education: None    Highest education level: None   Occupational History    None   Social Needs    Financial resource strain: None    Food insecurity     Worry: None     Inability: None    Transportation needs     Medical: None     Non-medical: None   Tobacco Use    Smoking status: Never Smoker    Smokeless tobacco: Never Used   Substance and Sexual Activity    Alcohol use: Yes     Alcohol/week: 1.0 standard drinks     Types: 1 Shots of liquor per week     Frequency: 2-3 times a week     Drinks per session: 1 or 2     Comment: Started Alc 24 y.o; pt is very vague; in her 45s and 46s, partied more often than nowgetting drunk over the wkends--Vodka; since 89, getting drunk 2 x mo; past 2 years11-1/2 shot of whiskeymixed drink every day. DAVID--1/14/2018;  No DUI or DT.   Annamarie Mcbride Drug use: No    Sexual activity: Yes     Partners: Male   Lifestyle    Physical activity     Days per week: None     Minutes per session: None    Stress: None   Relationships    Social connections     Talks on phone: None     Gets together: None     Attends Yarsanism service: None     Active member of club or organization: None     Attends meetings of clubs or organizations: None     Relationship status: None    Intimate partner violence     Fear of current or ex partner: None     Emotionally abused: None     Physically abused: None     Forced sexual activity: None   Other Topics Concern    None   Social History Narrative    None       SCREENINGS    Sharif Coma Scale  Eye Opening: Spontaneous  Best Verbal Response: Oriented  Best Motor Response: Obeys commands  Cornell Coma Scale Score: 15        PHYSICAL EXAM    (up to 7 for level 4, 8 or more for level 5)     ED Triage Vitals [03/26/21 1356]   BP Temp Temp Source Pulse Resp SpO2 Height Weight   117/82 99.2 °F (37.3 °C) Oral 90 16 97 % 4' 11\" (1.499 m) 135 lb (61.2 kg)       Physical Exam  Vitals signs reviewed. Constitutional:       General: She is not in acute distress. Appearance: She is not ill-appearing. HENT:      Head: Normocephalic. Right Ear: External ear normal.      Left Ear: External ear normal.      Nose: Nose normal.   Eyes:      Extraocular Movements: Extraocular movements intact. Neck:      Musculoskeletal: Neck supple. Cardiovascular:      Rate and Rhythm: Normal rate and regular rhythm. Pulmonary:      Effort: Pulmonary effort is normal. No respiratory distress. Breath sounds: Normal breath sounds. Abdominal:      General: Bowel sounds are normal. There is no distension. Palpations: Abdomen is soft. There is no hepatomegaly, splenomegaly or mass. Tenderness: There is abdominal tenderness in the epigastric area. There is no guarding or rebound. Negative signs include Montero's sign and McBurney's sign. Musculoskeletal: Normal range of motion.          General: No swelling or tenderness. Skin:     General: Skin is warm and dry. Coloration: Skin is not pale. Neurological:      General: No focal deficit present. Mental Status: She is alert and oriented to person, place, and time. Psychiatric:         Mood and Affect: Mood normal.         DIAGNOSTIC RESULTS     EKG: All EKG's are interpreted by the Emergency Department Physician who either signs orCo-signs this chart in the absence of a cardiologist.    RADIOLOGY:   Non-plain film images such as CT, Ultrasound and MRI are read by the radiologist. Plain radiographic images are visualized and preliminarily interpreted by the emergency physician with the below findings:    Interpretation per the Radiologist below, ifavailable at the time of this note:    CT ABDOMEN PELVIS W IV CONTRAST Additional Contrast? None   Final Result   * No acute intra-abdominal process. *4 mm nonobstructing calculus left kidney. *Sigmoid diverticulosis. ED BEDSIDE ULTRASOUND:   Performed by ED Physician - none    LABS:  Labs Reviewed   CBC WITH AUTO DIFFERENTIAL - Abnormal; Notable for the following components:       Result Value    Lymphocytes 20 (*)     Monocytes 17 (*)     Eosinophils % 5 (*)     Absolute Lymph # 0.70 (*)     All other components within normal limits   BASIC METABOLIC PANEL - Abnormal; Notable for the following components:    Glucose 102 (*)     Calcium 10.5 (*)     All other components within normal limits   LIPASE - Abnormal; Notable for the following components:    Lipase 117 (*)     All other components within normal limits   HEPATIC FUNCTION PANEL   URINALYSIS       All other labs were within normal range ornot returned as of this dictation.     EMERGENCY DEPARTMENT COURSE and DIFFERENTIAL DIAGNOSIS/MDM:   Vitals:    Vitals:    03/26/21 1356 03/26/21 1644   BP: 117/82 136/83   Pulse: 90 70   Resp: 16 14   Temp: 99.2 °F (37.3 °C)    TempSrc: Oral    SpO2: 97% 98%   Weight: 61.2 kg (135 lb)    Height: 4' 11\" (1.499 m)             ED work-up is negative for pancreatitis or other inflammatory intra-abdominal conditions. Patient's symptoms are consistent with gastritis or gastroesophagitis. She is placed on Protonix and a short course of Zofran. She is referred to outpatient primary care and GI follow-up. MDM    CONSULTS:  None    PROCEDURES:  Unlessotherwise noted below, none     Procedures    FINAL IMPRESSION      1. Abdominal pain, epigastric          DISPOSITION/PLAN   DISPOSITION Decision To Discharge 03/26/2021 04:51:16 PM      PATIENT REFERRED TO:  MD Walt Gómez MD  [address] [place] [address] 36.  [phone]            DISCHARGE MEDICATIONS:         Problem List:  Patient Active Problem List   Diagnosis Code    MS (multiple sclerosis) ([facility] G35    Essential hypertension I10    Neuropathy G62.9    Overweight (BMI 25.0-29. 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     ED Medicationsadministered this visit:    Medications   0.9 % sodium chloride bolus (1,000 mLs Intravenous New Bag 3/26/21 1606)   sodium chloride flush 0.9 % injection 10 mL (10 mLs Intravenous Given 3/26/21 1617)   iopamidol (ISOVUE-370) 76 % injection 75 mL (75 mLs Intravenous Given 3/26/21 1617)   0.9 % sodium chloride bolus (0 mLs Intravenous Stopped 3/26/21 1617)       New Prescriptions from this visit:    New Prescriptions    ONDANSETRON (ZOFRAN) 4 MG TABLET    Take 1 tablet by mouth every 6 hours as needed for Nausea or Vomiting    PANTOPRAZOLE (PROTONIX) 40 MG TABLET    Take 1 tablet by mouth daily       Follow-up:  MD Jami Granger MD  [address] [place] [address]  [phone]              Final Impression:   1.  Abdominal pain, epigastric               (Please note that portions of this note were completed with a voice recognitionprogram.  Efforts were made to edit the dictations but occasionally words are mis-transcribed.)    Riley Patel MD (electronically signed)  Attending Emergency Physician            Riley Patel MD  03/26/21 6094

## 2021-05-30 ENCOUNTER — HOSPITAL ENCOUNTER (EMERGENCY)
Age: 76
Discharge: HOME | End: 2021-05-30
Attending: EMERGENCY MEDICINE
Payer: MEDICARE

## 2021-05-30 ENCOUNTER — APPOINTMENT (OUTPATIENT)
Dept: GENERAL RADIOLOGY | Age: 76
End: 2021-05-30
Payer: MEDICARE

## 2021-05-30 VITALS
BODY MASS INDEX: 27.82 KG/M2 | WEIGHT: 138 LBS | RESPIRATION RATE: 14 BRPM | HEIGHT: 59 IN | OXYGEN SATURATION: 98 % | SYSTOLIC BLOOD PRESSURE: 146 MMHG | HEART RATE: 79 BPM | DIASTOLIC BLOOD PRESSURE: 89 MMHG | TEMPERATURE: 98.4 F

## 2021-05-30 DIAGNOSIS — S63.91XA HAND SPRAIN, RIGHT, INITIAL ENCOUNTER: Primary | ICD-10-CM

## 2021-05-30 DIAGNOSIS — S16.1XXA ACUTE STRAIN OF NECK MUSCLE, INITIAL ENCOUNTER: ICD-10-CM

## 2021-05-30 PROCEDURE — 72040 X-RAY EXAM NECK SPINE 2-3 VW: CPT

## 2021-05-30 PROCEDURE — 73130 X-RAY EXAM OF HAND: CPT

## 2021-05-30 PROCEDURE — 99283 EMERGENCY DEPT VISIT LOW MDM: CPT

## 2021-05-30 RX ORDER — METHOCARBAMOL 500 MG/1
500 TABLET, FILM COATED ORAL 3 TIMES DAILY PRN
Qty: 15 TABLET | Refills: 0 | Status: SHIPPED | OUTPATIENT
Start: 2021-05-30 | End: 2021-06-04

## 2021-05-30 ASSESSMENT — ENCOUNTER SYMPTOMS
SORE THROAT: 0
DIARRHEA: 0
SHORTNESS OF BREATH: 0
VOMITING: 0

## 2021-05-30 ASSESSMENT — PAIN SCALES - GENERAL: PAINLEVEL_OUTOF10: 4

## 2021-05-30 ASSESSMENT — PAIN DESCRIPTION - PAIN TYPE: TYPE: ACUTE PAIN

## 2021-05-30 NOTE — ED NOTES
Pt ambulates to room - gait steady. Pt reports tripping over end table 5 days PTA- denies striking head/LOC. Pt reports neck pain- Hx of hardware in neck- right hand pain- PMS intact. Pt AOx4. RR easy, unlabored.       Jessica Castillo RN  05/30/21 4740

## 2021-05-30 NOTE — ED PROVIDER NOTES
[id number] Count includes the [facility] ED  [address] [facility] [place] [address]  Phone: [phone]  Fax: [phone] [facility] ED  Edith      Pt Name: Claudeen Lone  MRN: [id number]  [place] 1945  Date of evaluation: 5/30/2021  Provider: Yokasta Santo, [address] [place] [address]       Chief Complaint   Patient presents with    Fall     5 days PTA- tripped over end table. HISTORY OF PRESENT ILLNESS   (Location/Symptom, Timing/Onset,Context/Setting, Quality, Duration, Modifying Factors, Severity)  Note limiting factors. Claudeen Lone is a 68 y.o. female who presents to the emergency department for the evaluation of an injury to her right hand as well as her left shoulder. This occurred when she tripped over an end table about 4 days ago. She just been having some mild pain in her hand since that time, worse with certain movement or positions. No numbness or weakness. She also complains of some pain in her left trapezius muscle that radiates toward her shoulder and neck. She did have disc surgery on her neck with a screw placed about a year ago. She has no numbness or weakness in her arms or legs. No other acute complaints. She has been taking some Aleve with minimal relief    Nursing Notes were reviewed. REVIEW OF SYSTEMS    (2-9systems for level 4, 10 or more for level 5)     Review of Systems   Constitutional: Negative for fever. HENT: Negative for sore throat. Respiratory: Negative for shortness of breath. Cardiovascular: Negative for chest pain. Gastrointestinal: Negative for diarrhea and vomiting. Genitourinary: Negative for dysuria. Musculoskeletal: Positive for neck pain. Right hand pain   Skin: Negative for rash. Neurological: Negative for weakness. All other systems reviewed and are negative. Except asnoted above the remainder of the review of systems was reviewed and negative.        PAST MEDICAL HISTORY     Past Medical History:   Diagnosis Date    Anxiety     Arthritis     Cataracts, bilateral     Depression     Diabetes mellitus ([address]     pre diabetic    Hearing loss     Hyperlipidemia     Hypertension     MS (multiple sclerosis) ([facility]     Osteoarthritis     Osteoporosis          SURGICAL HISTORY       Past Surgical History:   Procedure Laterality Date    CERVICAL DISCECTOMY  04/22/2020    Dr. Margaret Espinoza [facility] [address]  04/22/2020    [facility], Margaret Ernst MD    EYE SURGERY      x3    HERNIA REPAIR  05/2017    ingial    KNEE SURGERY      menicus right 2007    SHOULDER SURGERY      right rotator cuff 2016         CURRENT MEDICATIONS     Previous Medications    ALPRAZOLAM (XANAX) 0.5 MG TABLET    Take 0.5 mg by mouth as needed. prn    AMLODIPINE (NORVASC) 5 MG TABLET    Take 1 tablet by mouth daily    ATORVASTATIN (LIPITOR) 20 MG TABLET    Take 20 mg by mouth daily    CALCIUM CARB-CHOLECALCIFEROL (CALCIUM-VITAMIN D) 500-200 MG-UNIT PER TABLET    Take 1 tablet by mouth daily     DENOSUMAB (PROLIA SC)    Inject into the skin    ONDANSETRON (ZOFRAN ODT) 4 MG DISINTEGRATING TABLET    Take 1 tablet by mouth every 8 hours as needed for Nausea    ONDANSETRON (ZOFRAN) 4 MG TABLET    Take 1 tablet by mouth every 6 hours as needed for Nausea or Vomiting    PANTOPRAZOLE (PROTONIX) 40 MG TABLET    Take 1 tablet by mouth daily    PAROXETINE (PAXIL) 40 MG TABLET    Take 40 mg by mouth every morning    PHENAZOPYRIDINE (PYRIDIUM) 100 MG TABLET    Take 1 tablet by mouth 3 times daily as needed for Pain       ALLERGIES     Ciprofloxacin, Lisinopril, and Codeine    FAMILY HISTORY       Family History   Adopted: Yes          SOCIAL HISTORY       Social History     Socioeconomic History    Marital status:       Spouse name: None    Number of children: None    Years of education: None    Highest education level: None   Occupational History    None   Tobacco Use    Smoking status: Never Smoker    Smokeless tobacco: Never Used   Vaping Use    Vaping Use: Never used   Substance and Sexual Activity    Alcohol use: Yes     Alcohol/week: 1.0 standard drinks     Types: 1 Shots of liquor per week     Comment: Started Alc 21 y.o; pt is very vague; in her 45s and 46s, partied more often than now-getting drunk over the wkends--Vodka; since 89, getting drunk 2 x mo; past 2 years-1-1-1/2 shot of whiskey-mixed drink every day. DAVID--1/14/2018; No DUI or DTDelon Roque Drug use: No    Sexual activity: Yes     Partners: Male   Other Topics Concern    None   Social History Narrative    None     Social Determinants of Health     Financial Resource Strain:     Difficulty of Paying Living Expenses:    Food Insecurity:     Worried About Running Out of Food in the Last Year:     Ran Out of Food in the Last Year:    Transportation Needs:     Lack of Transportation (Medical):  Lack of Transportation (Non-Medical):    Physical Activity:     Days of Exercise per Week:     Minutes of Exercise per Session:    Stress:     Feeling of Stress :    Social Connections:     Frequency of Communication with Friends and Family:     Frequency of Social Gatherings with Friends and Family:     Attends Mu-ism Services:     Active Member of Clubs or Organizations:     Attends Club or Organization Meetings:     Marital Status:    Intimate Partner Violence:     Fear of Current or Ex-Partner:     Emotionally Abused:     Physically Abused:     Sexually Abused:        SCREENINGS             PHYSICAL EXAM    (up to 7 for level 4, 8 or more for level 5)     ED Triage Vitals   BP Temp Temp Source Pulse Resp SpO2 Height Weight   05/30/21 1254 05/30/21 1254 05/30/21 1254 05/30/21 1254 05/30/21 1254 05/30/21 1254 05/30/21 1249 05/30/21 1249   (!) 146/89 98.4 °F (36.9 °C) Oral 79 14 98 % 4' 11\" (1.499 m) 138 lb (62.6 kg)       Physical Exam  Vitals and nursing note reviewed.    Constitutional:       General: 4' 11\" (1.499 m)        Patient presents to the emergency department with a complaint described above. Vital signs are grossly normal, patient nontoxic, physical examination reveals mild point tenderness in the left trapezius muscle without any cervical tenderness or obvious hand tenderness. Based on the trauma. The pain I will get x-ray and I will reevaluate      DIAGNOSTIC RESULTS     LABS:  Labs Reviewed - No data to display    All other labs were within normal range or not returned as of this dictation. RADIOLOGY:  XR HAND RIGHT (MIN 3 VIEWS)   Final Result   No acute osseous or soft tissue abnormality. Degenerative change most pronounced in the distal interphalangeal joint   spaces. XR CERVICAL SPINE (2-3 VIEWS)   Final Result   Anterior hardware fixation at Y0-6 without complication. Multilevel degenerative change most pronounced at C6-7. ED Course as of May 30 1403   Sun May 30, 8947   1188 No complication noted in the hardware the neck on her cervical spine and no new injury or fracture appreciated on x-ray. [TS]   1401 X-ray of the right hand shows no acute fracture or dislocation, overall normal    [TS]   1401 At this time I am recommending rest, ice, elevation, PCP follow-up and we talked about what to return to ER for    At this time the patient is without objective evidence of an acute process requiring hospitalization or inpatient management. They have remained hemodynamically stable and are stable for discharge with outpatient follow-up. Standard anticipatory guidance given to patient upon discharge. Have given them a specific time frame in which to follow-up and who to follow-up with. I have also advised them that they should return to the emergency department if they get worse, or not getting better or develop any new or concerning symptoms.   Patient demonstrates understanding.    [TS]      ED Course User Index  [TS] Arpan Maier DO

## 2021-10-15 ENCOUNTER — HOSPITAL ENCOUNTER (EMERGENCY)
Age: 76
Discharge: HOME | End: 2021-10-15
Attending: EMERGENCY MEDICINE
Payer: MEDICARE

## 2021-10-15 ENCOUNTER — APPOINTMENT (OUTPATIENT)
Dept: CT IMAGING | Age: 76
End: 2021-10-15
Payer: MEDICARE

## 2021-10-15 VITALS
TEMPERATURE: 98.6 F | DIASTOLIC BLOOD PRESSURE: 98 MMHG | SYSTOLIC BLOOD PRESSURE: 167 MMHG | HEART RATE: 77 BPM | RESPIRATION RATE: 18 BRPM | OXYGEN SATURATION: 98 %

## 2021-10-15 DIAGNOSIS — K57.90 DIVERTICULOSIS: ICD-10-CM

## 2021-10-15 DIAGNOSIS — N39.0 URINARY TRACT INFECTION WITH HEMATURIA, SITE UNSPECIFIED: Primary | ICD-10-CM

## 2021-10-15 DIAGNOSIS — N20.0 KIDNEY STONE: ICD-10-CM

## 2021-10-15 DIAGNOSIS — I31.39 PERICARDIAL EFFUSION: ICD-10-CM

## 2021-10-15 DIAGNOSIS — R31.9 URINARY TRACT INFECTION WITH HEMATURIA, SITE UNSPECIFIED: Primary | ICD-10-CM

## 2021-10-15 DIAGNOSIS — N28.1 CYST OF RIGHT KIDNEY: ICD-10-CM

## 2021-10-15 LAB
-: ABNORMAL
ABSOLUTE EOS #: 0.2 K/UL (ref 0–0.4)
ABSOLUTE IMMATURE GRANULOCYTE: ABNORMAL K/UL (ref 0–0.3)
ABSOLUTE LYMPH #: 1.4 K/UL (ref 1–4.8)
ABSOLUTE MONO #: 0.7 K/UL (ref 0.1–1.2)
ALBUMIN SERPL-MCNC: 4.4 G/DL (ref 3.5–5.2)
ALBUMIN/GLOBULIN RATIO: 1.7 (ref 1–2.5)
ALP BLD-CCNC: 128 U/L (ref 35–104)
ALT SERPL-CCNC: 15 U/L (ref 5–33)
AMORPHOUS: ABNORMAL
AMYLASE: 55 U/L (ref 28–100)
ANION GAP SERPL CALCULATED.3IONS-SCNC: 14 MMOL/L (ref 9–17)
AST SERPL-CCNC: 13 U/L
BACTERIA: ABNORMAL
BASOPHILS # BLD: 1 % (ref 0–2)
BASOPHILS ABSOLUTE: 0 K/UL (ref 0–0.2)
BILIRUB SERPL-MCNC: 0.48 MG/DL (ref 0.3–1.2)
BILIRUBIN URINE: NEGATIVE
BUN BLDV-MCNC: 21 MG/DL (ref 8–23)
BUN/CREAT BLD: ABNORMAL (ref 9–20)
CALCIUM SERPL-MCNC: 10 MG/DL (ref 8.6–10.4)
CASTS UA: ABNORMAL /LPF
CASTS UA: ABNORMAL /LPF
CHLORIDE BLD-SCNC: 100 MMOL/L (ref 98–107)
CO2: 24 MMOL/L (ref 20–31)
COLOR: YELLOW
COMMENT UA: ABNORMAL
CREAT SERPL-MCNC: 0.84 MG/DL (ref 0.5–0.9)
CRYSTALS, UA: ABNORMAL /HPF
DIFFERENTIAL TYPE: ABNORMAL
EOSINOPHILS RELATIVE PERCENT: 3 % (ref 1–4)
EPITHELIAL CELLS UA: ABNORMAL /HPF (ref 0–5)
GFR AFRICAN AMERICAN: >60 ML/MIN
GFR NON-AFRICAN AMERICAN: >60 ML/MIN
GFR SERPL CREATININE-BSD FRML MDRD: ABNORMAL ML/MIN/{1.73_M2}
GFR SERPL CREATININE-BSD FRML MDRD: ABNORMAL ML/MIN/{1.73_M2}
GLUCOSE BLD-MCNC: 111 MG/DL (ref 70–99)
GLUCOSE URINE: NEGATIVE
HCT VFR BLD CALC: 41.6 % (ref 36–46)
HEMOGLOBIN: 14.2 G/DL (ref 12–16)
IMMATURE GRANULOCYTES: ABNORMAL %
KETONES, URINE: NEGATIVE
LEUKOCYTE ESTERASE, URINE: ABNORMAL
LIPASE: 30 U/L (ref 13–60)
LYMPHOCYTES # BLD: 20 % (ref 24–44)
MAGNESIUM: 2.2 MG/DL (ref 1.6–2.6)
MCH RBC QN AUTO: 32.1 PG (ref 26–34)
MCHC RBC AUTO-ENTMCNC: 34 G/DL (ref 31–37)
MCV RBC AUTO: 94.4 FL (ref 80–100)
MONOCYTES # BLD: 9 % (ref 2–11)
MUCUS: ABNORMAL
NITRITE, URINE: NEGATIVE
NRBC AUTOMATED: ABNORMAL PER 100 WBC
OTHER OBSERVATIONS UA: ABNORMAL
PDW BLD-RTO: 15.4 % (ref 12.5–15.4)
PH UA: 6 (ref 5–8)
PLATELET # BLD: 292 K/UL (ref 140–450)
PLATELET ESTIMATE: ABNORMAL
PMV BLD AUTO: 8 FL (ref 6–12)
POTASSIUM SERPL-SCNC: 3.5 MMOL/L (ref 3.7–5.3)
PROTEIN UA: NEGATIVE
RBC # BLD: 4.41 M/UL (ref 4–5.2)
RBC # BLD: ABNORMAL 10*6/UL
RBC UA: ABNORMAL /HPF (ref 0–2)
RENAL EPITHELIAL, UA: ABNORMAL /HPF
SEG NEUTROPHILS: 67 % (ref 36–66)
SEGMENTED NEUTROPHILS ABSOLUTE COUNT: 4.7 K/UL (ref 1.8–7.7)
SODIUM BLD-SCNC: 138 MMOL/L (ref 135–144)
SPECIFIC GRAVITY UA: 1.02 (ref 1–1.03)
TOTAL PROTEIN: 7 G/DL (ref 6.4–8.3)
TRICHOMONAS: ABNORMAL
TURBIDITY: ABNORMAL
URINE HGB: ABNORMAL
UROBILINOGEN, URINE: NORMAL
WBC # BLD: 7 K/UL (ref 3.5–11)
WBC # BLD: ABNORMAL 10*3/UL
WBC UA: ABNORMAL /HPF (ref 0–5)
YEAST: ABNORMAL

## 2021-10-15 PROCEDURE — 74176 CT ABD & PELVIS W/O CONTRAST: CPT

## 2021-10-15 PROCEDURE — 82150 ASSAY OF AMYLASE: CPT

## 2021-10-15 PROCEDURE — 6370000000 HC RX 637 (ALT 250 FOR IP): Performed by: EMERGENCY MEDICINE

## 2021-10-15 PROCEDURE — 85025 COMPLETE CBC W/AUTO DIFF WBC: CPT

## 2021-10-15 PROCEDURE — 81001 URINALYSIS AUTO W/SCOPE: CPT

## 2021-10-15 PROCEDURE — 87086 URINE CULTURE/COLONY COUNT: CPT

## 2021-10-15 PROCEDURE — 83735 ASSAY OF MAGNESIUM: CPT

## 2021-10-15 PROCEDURE — 83690 ASSAY OF LIPASE: CPT

## 2021-10-15 PROCEDURE — 99285 EMERGENCY DEPT VISIT HI MDM: CPT

## 2021-10-15 PROCEDURE — 36415 COLL VENOUS BLD VENIPUNCTURE: CPT

## 2021-10-15 PROCEDURE — 80053 COMPREHEN METABOLIC PANEL: CPT

## 2021-10-15 RX ORDER — HYDROCODONE BITARTRATE AND ACETAMINOPHEN 5; 325 MG/1; MG/1
1 TABLET ORAL EVERY 6 HOURS PRN
Qty: 12 TABLET | Refills: 0 | Status: SHIPPED | OUTPATIENT
Start: 2021-10-15 | End: 2021-10-18

## 2021-10-15 RX ORDER — CEPHALEXIN 500 MG/1
500 CAPSULE ORAL 2 TIMES DAILY
Qty: 20 CAPSULE | Refills: 0 | Status: SHIPPED | OUTPATIENT
Start: 2021-10-15 | End: 2021-10-25

## 2021-10-15 RX ORDER — PHENAZOPYRIDINE HYDROCHLORIDE 100 MG/1
200 TABLET, FILM COATED ORAL ONCE
Status: COMPLETED | OUTPATIENT
Start: 2021-10-15 | End: 2021-10-15

## 2021-10-15 RX ORDER — PHENAZOPYRIDINE HYDROCHLORIDE 200 MG/1
200 TABLET, FILM COATED ORAL 3 TIMES DAILY PRN
Qty: 6 TABLET | Refills: 0 | Status: SHIPPED | OUTPATIENT
Start: 2021-10-15 | End: 2021-10-18

## 2021-10-15 RX ORDER — CEPHALEXIN 250 MG/1
500 CAPSULE ORAL ONCE
Status: COMPLETED | OUTPATIENT
Start: 2021-10-15 | End: 2021-10-15

## 2021-10-15 RX ADMIN — PHENAZOPYRIDINE HYDROCHLORIDE 200 MG: 100 TABLET ORAL at 02:01

## 2021-10-15 RX ADMIN — CEPHALEXIN 500 MG: 250 CAPSULE ORAL at 02:01

## 2021-10-15 ASSESSMENT — ENCOUNTER SYMPTOMS
COUGH: 0
WHEEZING: 0
SHORTNESS OF BREATH: 0
VOMITING: 0
ABDOMINAL PAIN: 0
STRIDOR: 0
EYE REDNESS: 0
EYE PAIN: 0
DIARRHEA: 0
NAUSEA: 0
CONSTIPATION: 0
COLOR CHANGE: 0
SORE THROAT: 0
EYE DISCHARGE: 0

## 2021-10-15 ASSESSMENT — PAIN DESCRIPTION - LOCATION: LOCATION: FLANK

## 2021-10-15 ASSESSMENT — PAIN DESCRIPTION - PAIN TYPE: TYPE: ACUTE PAIN

## 2021-10-15 ASSESSMENT — PAIN SCALES - GENERAL: PAINLEVEL_OUTOF10: 4

## 2021-10-15 ASSESSMENT — PAIN DESCRIPTION - ORIENTATION: ORIENTATION: RIGHT;LEFT

## 2021-10-15 ASSESSMENT — PAIN DESCRIPTION - DESCRIPTORS: DESCRIPTORS: ACHING

## 2021-10-15 NOTE — ED PROVIDER NOTES
[address] [facility] ED  EMERGENCY DEPARTMENT ENCOUNTER      Pt Name: Angelique Stoddard  MRN: [id number]  [place] 1945  Date of evaluation: 10/15/2021  Provider: Kp Johnson MD    [address] [place] [address]       Chief Complaint   Patient presents with    Flank Pain     pt states s/s started yesterday. Pt having bilat flank pain.  Dysuria     pt c/o urinary frequency as well. HISTORY OF PRESENT ILLNESS  (Location/Symptom, Timing/Onset, Context/Setting, Quality, Duration, Modifying Factors, Severity.)   Angelique Stoddard is a 68 y.o. female who presents to the emergency department complaining of bilateral flank pain that started yesterday as well as dysuria and frequency. She relates the sensation that when she goes it feels like she has to go again. She relates that she was placed on some type of medicine to help bladder spasm sometime ago but she stopped taking it after a couple days because she thought she was getting a urinary tract infection and it seemed like it was getting worse. She has had no fever or chills. No nausea or vomiting. She tells me when I come in the room that actually her pain has improved from what it was when she initially came in.    Nursing Notes were reviewed. REVIEW OF SYSTEMS    (2-9 systems for level 4, 10 or more for level 5)     Review of Systems   Constitutional: Negative for activity change, appetite change, chills, fatigue and fever. HENT: Negative for congestion, ear pain and sore throat. Eyes: Negative for pain, discharge and redness. Respiratory: Negative for cough, shortness of breath, wheezing and stridor. Cardiovascular: Negative for chest pain. Gastrointestinal: Negative for abdominal pain, constipation, diarrhea, nausea and vomiting. Genitourinary: Positive for dysuria, flank pain and frequency. Negative for decreased urine volume and difficulty urinating. Musculoskeletal: Negative for arthralgias and myalgias.    Skin: Negative for color change and rash. Neurological: Negative for dizziness, weakness and headaches. Psychiatric/Behavioral: Negative for behavioral problems and confusion. Except as noted above the remainder of the review of systems was reviewed and negative. PAST MEDICAL HISTORY     Past Medical History:   Diagnosis Date    Anxiety     Arthritis     Cataracts, bilateral     Depression     Diabetes mellitus ([address]     pre diabetic    Hearing loss     Hyperlipidemia     Hypertension     MS (multiple sclerosis) ([address]     Osteoarthritis     Osteoporosis        SURGICAL HISTORY       Past Surgical History:   Procedure Laterality Date    CERVICAL DISCECTOMY  04/22/2020    Dr. Liz Krishnan a [facility] [address]  04/22/2020    [facility], Liz Krishnan MD    EYE SURGERY      x3    HERNIA REPAIR  05/2017    ingial    KNEE SURGERY      menicus right 2007    SHOULDER SURGERY      right rotator cuff 2016       CURRENT MEDICATIONS       Previous Medications    ALPRAZOLAM (XANAX) 0.5 MG TABLET    Take 0.5 mg by mouth as needed. prn    AMLODIPINE (NORVASC) 5 MG TABLET    Take 1 tablet by mouth daily    ATORVASTATIN (LIPITOR) 20 MG TABLET    Take 20 mg by mouth daily    CALCIUM CARB-CHOLECALCIFEROL (CALCIUM-VITAMIN D) 500-200 MG-UNIT PER TABLET    Take 1 tablet by mouth daily     DENOSUMAB (PROLIA SC)    Inject into the skin    ONDANSETRON (ZOFRAN ODT) 4 MG DISINTEGRATING TABLET    Take 1 tablet by mouth every 8 hours as needed for Nausea    ONDANSETRON (ZOFRAN) 4 MG TABLET    Take 1 tablet by mouth every 6 hours as needed for Nausea or Vomiting    PANTOPRAZOLE (PROTONIX) 40 MG TABLET    Take 1 tablet by mouth daily    PAROXETINE (PAXIL) 40 MG TABLET    Take 40 mg by mouth every morning       ALLERGIES     Ciprofloxacin, Lisinopril, and Codeine    FAMILY HISTORY           Adopted: Yes     No family status information on file. SOCIAL HISTORY      reports that she has never smoked.  She has never used smokeless tobacco. She reports current alcohol use of about 1.0 standard drinks of alcohol per week. She reports that she does not use drugs. PHYSICAL EXAM    (up to 7 for level 4, 8 or more for level 5)     Physical Exam  Vitals and nursing note reviewed. Constitutional:       General: She is not in acute distress. Appearance: She is well-developed. She is not ill-appearing, toxic-appearing or diaphoretic. HENT:      Head: Normocephalic and atraumatic. Right Ear: External ear normal.      Left Ear: External ear normal.      Nose: Nose normal.      Mouth/Throat:      Mouth: Mucous membranes are moist.   Eyes:      General:         Right eye: No discharge. Left eye: No discharge. Conjunctiva/sclera: Conjunctivae normal.      Pupils: Pupils are equal, round, and reactive to light. Cardiovascular:      Rate and Rhythm: Normal rate and regular rhythm. Heart sounds: Normal heart sounds. No murmur heard. Pulmonary:      Effort: Pulmonary effort is normal. No respiratory distress. Breath sounds: Normal breath sounds. No wheezing, rhonchi or rales. Chest:      Chest wall: No tenderness. Abdominal:      General: Bowel sounds are normal. There is no distension. Palpations: Abdomen is soft. There is no mass. Tenderness: There is no abdominal tenderness. There is no guarding or rebound. Musculoskeletal:         General: Normal range of motion. Cervical back: Normal range of motion and neck supple. Right lower leg: No edema. Left lower leg: No edema. Skin:     General: Skin is warm. Findings: No rash. Neurological:      General: No focal deficit present. Mental Status: She is alert and oriented to person, place, and time. Motor: No abnormal muscle tone.    Psychiatric:         Mood and Affect: Mood normal.         Behavior: Behavior normal.         DIAGNOSTIC RESULTS     EKG: All EKG's are interpreted by the Emergency Department Physician who either signs or Co-signs this chart in the absence of a cardiologist.    none    RADIOLOGY:   Non-plain film images such as CT, Ultrasound and MRI are read by the radiologist. Plain radiographic images are visualized and preliminarily interpreted by the emergency physician with the below findings:    Interpretation per the Radiologist below, if available at the time of this note:    CT ABDOMEN PELVIS WO CONTRAST Additional Contrast? None   Final Result   1. Obstructing distal left ureteral 2 mm diameter calculus at the   ureterovesicular junction (UVJ) with associated mild left ureterectasis and   mild left renal pelvicaliectasis. 2. Bilateral renal collecting system numerous multifocal calculi, as   discussed above, without further evidence of urinary obstruction. 3. Minimal pericardial effusion. 4. Inferior right renal cyst, incompletely evaluated in absence of   intravenous iodinated contrast administration. 5. Normal appendix. 6. Sigmoid colon diverticulosis without evidence of diverticulitis. ED BEDSIDE ULTRASOUND:   Performed by ED Physician - none    LABS:  Labs Reviewed   URINE RT REFLEX TO CULTURE - Abnormal; Notable for the following components:       Result Value    Turbidity UA SLIGHTLY CLOUDY (*)     Urine Hgb LARGE (*)     Leukocyte Esterase, Urine TRACE (*)     All other components within normal limits   MICROSCOPIC URINALYSIS - Abnormal; Notable for the following components:    Bacteria, UA FEW (*)     Mucus, UA 2+ (*)     Other Observations UA Culture ordered based on defined criteria.  (*)     All other components within normal limits   CBC WITH AUTO DIFFERENTIAL - Abnormal; Notable for the following components:    Seg Neutrophils 67 (*)     Lymphocytes 20 (*)     All other components within normal limits   COMPREHENSIVE METABOLIC PANEL W/ REFLEX TO MG FOR LOW K - Abnormal; Notable for the following components:    Glucose 111 (*)     Potassium 3.5 (*) Alkaline Phosphatase 128 (*)     All other components within normal limits   CULTURE, URINE   LIPASE   AMYLASE   MAGNESIUM       All other labs were within normal range or not returned as of this dictation. EMERGENCY DEPARTMENT COURSE and DIFFERENTIAL DIAGNOSIS/MDM:   Vitals:    Vitals:    10/15/21 0118   BP: (!) 167/98   Pulse: 77   Resp: 18   Temp: 98.6 °F (37 °C)   TempSrc: Oral   SpO2: 98%     We did discuss CT scan and lab work in addition to the urine because she feels like this is what she has had before when she has had a kidney stone. She is agreeable. I have answered any questions she has at this time. She relates she really does not need anything at this time for pain. I did go over results with the patient. She is feeling much better at this point in time. I have answered any questions she has at this time. We talked about following up with family physician and/or urology. CONSULTS:  None    PROCEDURES:  None    FINAL IMPRESSION      1. Urinary tract infection with hematuria, site unspecified    2. Diverticulosis    3. Kidney stone    4. Pericardial effusion    5. Cyst of right kidney          DISPOSITION/PLAN   DISPOSITION  home      PATIENT REFERRED TO:  Hailee Kirby MD  [address] [place] [address]  [phone]    Call today        DISCHARGE MEDICATIONS:  New Prescriptions    CEPHALEXIN (KEFLEX) 500 MG CAPSULE    Take 1 capsule by mouth 2 times daily for 10 days    HYDROCODONE-ACETAMINOPHEN (NORCO) 5-325 MG PER TABLET    Take 1 tablet by mouth every 6 hours as needed for Pain for up to 3 days. Intended supply: 3 days.  Take lowest dose possible to manage pain    PHENAZOPYRIDINE (PYRIDIUM) 200 MG TABLET    Take 1 tablet by mouth 3 times daily as needed for Pain (bladder spasm/pain)       (Please note that portions of this note were completed with a voice recognition program.  Efforts were made to edit the dictations but occasionally words are mis-transcribed.)    Richard Hsieh MD  Attending Emergency Physician        Richard Hsieh MD  10/15/21 0332

## 2021-10-16 LAB
CULTURE: NORMAL
Lab: NORMAL
SPECIMEN DESCRIPTION: NORMAL

## 2021-10-19 ENCOUNTER — OFFICE VISIT (OUTPATIENT)
Dept: UROLOGY | Age: 76
End: 2021-10-19
Payer: MEDICARE

## 2021-10-19 VITALS
TEMPERATURE: 97.2 F | SYSTOLIC BLOOD PRESSURE: 112 MMHG | DIASTOLIC BLOOD PRESSURE: 78 MMHG | HEIGHT: 59 IN | BODY MASS INDEX: 28.22 KG/M2 | WEIGHT: 140 LBS

## 2021-10-19 DIAGNOSIS — R10.9 FLANK PAIN: ICD-10-CM

## 2021-10-19 DIAGNOSIS — N20.0 KIDNEY STONES: Primary | ICD-10-CM

## 2021-10-19 PROCEDURE — 1090F PRES/ABSN URINE INCON ASSESS: CPT | Performed by: UROLOGY

## 2021-10-19 PROCEDURE — G8417 CALC BMI ABV UP PARAM F/U: HCPCS | Performed by: UROLOGY

## 2021-10-19 PROCEDURE — 1123F ACP DISCUSS/DSCN MKR DOCD: CPT | Performed by: UROLOGY

## 2021-10-19 PROCEDURE — 99214 OFFICE O/P EST MOD 30 MIN: CPT | Performed by: UROLOGY

## 2021-10-19 PROCEDURE — G8399 PT W/DXA RESULTS DOCUMENT: HCPCS | Performed by: UROLOGY

## 2021-10-19 PROCEDURE — G8484 FLU IMMUNIZE NO ADMIN: HCPCS | Performed by: UROLOGY

## 2021-10-19 PROCEDURE — 1036F TOBACCO NON-USER: CPT | Performed by: UROLOGY

## 2021-10-19 PROCEDURE — 4040F PNEUMOC VAC/ADMIN/RCVD: CPT | Performed by: UROLOGY

## 2021-10-19 PROCEDURE — G8427 DOCREV CUR MEDS BY ELIG CLIN: HCPCS | Performed by: UROLOGY

## 2021-10-19 RX ORDER — FLUCONAZOLE 100 MG/1
100 TABLET ORAL DAILY
Qty: 3 TABLET | Refills: 0 | Status: SHIPPED | OUTPATIENT
Start: 2021-10-19 | End: 2021-10-22

## 2021-10-19 RX ORDER — HYDROCODONE BITARTRATE AND ACETAMINOPHEN 5; 325 MG/1; MG/1
1 TABLET ORAL EVERY 6 HOURS PRN
Qty: 28 TABLET | Refills: 0 | Status: SHIPPED | OUTPATIENT
Start: 2021-10-19 | End: 2021-10-26

## 2021-10-19 RX ORDER — OXYBUTYNIN CHLORIDE 5 MG/1
TABLET ORAL
COMMUNITY
Start: 2021-07-28

## 2021-10-19 RX ORDER — HYDROCODONE BITARTRATE AND ACETAMINOPHEN 5; 325 MG/1; MG/1
TABLET ORAL
COMMUNITY
Start: 2021-10-15

## 2021-10-19 RX ORDER — TAMSULOSIN HYDROCHLORIDE 0.4 MG/1
0.4 CAPSULE ORAL DAILY
Qty: 30 CAPSULE | Refills: 0 | Status: SHIPPED | OUTPATIENT
Start: 2021-10-19

## 2021-10-19 ASSESSMENT — ENCOUNTER SYMPTOMS
SHORTNESS OF BREATH: 0
CONSTIPATION: 1
ABDOMINAL PAIN: 1
EYE PAIN: 0
NAUSEA: 0
BACK PAIN: 1
COUGH: 1
VOMITING: 0
WHEEZING: 0
EYE REDNESS: 0
DIARRHEA: 0

## 2021-10-19 NOTE — PROGRESS NOTES
[address] [address]  Dept: [address] [facility] Urology Office Note - New Patient    Patient:  Trish Lucio  YOB: 1945  Date: 10/19/2021    The patient is a 68 y.o. female who presentstoday for evaluation of the following problems:   Chief Complaint   Patient presents with    Nephrolithiasis     unable to urinate at this time. referred by Raghav Hernandez MD.    HPI  Has h/o stones, here for acute left flank pain, and sp pain. No dysuria, no hematuria, no fevers, urinates with good stream    (Patient's old records have been requested, reviewed and summarized in today's note.)    Summary of old records: N/A    History: N/A    ProceduresToday: N/A    Urinalysis today:  No results found for this visit on 10/19/21. AUA Symptom Score (10/19/2021):   INCOMPLETE EMPTYING: How often have you had the sensation of not emptying your bladder?: Less than 1 to 5 times  FREQUENCY: How often do you have to urinate less than every two hours?: Not at all  INTERMITTENCY: How often have you found you stopped and started again several times when you urinated?: Less than 1 to 5 times  URGENCY: How often have you found it difficult to postpone urination?: Less than 1 to 5 times  WEAK STREAM: How often have you had a weak urinary stream?: About Half the time  STRAINING: How often have you had to strain to start  urination?: Less than 1 to 5 times  NOCTURIA: How many times did you typically get up at night to uriniate?: 4 Times  TOTAL I-PSS SCORE[de-identified] 11  How would you feel if you were to spend the rest of your life with your urinary condition?: Unhappy    Last BUN andcreatinine:  Lab Results   Component Value Date    BUN 21 10/15/2021     Lab Results   Component Value Date    CREATININE 0.84 10/15/2021       Additional Lab/Culture results: none    Reviewed during this Office Visit: none  (results were independently reviewed byphysician and radiology report verified)    PAST MEDICAL, FAMILY AND SOCIAL HISTORY:  Past Medical History:   Diagnosis Date    Anxiety     Arthritis     Cataracts, bilateral     Depression     Diabetes mellitus ([address]     pre diabetic    Hearing loss     Hyperlipidemia     Hypertension     MS [facility]multiple [facility]) [facility]     Osteoarthritis     Osteoporosis      Past Surgical History:   Procedure Laterality Date    CERVICAL DISCECTOMY  04/22/2020    Dr. [place] [address]el [address]  04/22/2020    [facility], [place] Breen MD    EYE SURGERY      x3   [address] [facility]  05/2017    ingial    KNEE SURGERY      menicus right 2007    SHOULDER SURGERY      right rotator cuff 2016     Family History   Adopted: Yes     Outpatient Medications Marked as Taking for the 10/19/21 encounter (Office Visit) with Shon Zamora MD   Medication Sig Dispense Refill    oxybutynin (DITROPAN) 5 MG tablet       HYDROcodone-acetaminophen (NORCO) 5-325 MG per tablet       tamsulosin (FLOMAX) 0.4 MG capsule Take 1 capsule by mouth daily 30 capsule 0    cephALEXin (KEFLEX) 500 MG capsule Take 1 capsule by mouth 2 times daily for 10 days 20 capsule 0    ondansetron (ZOFRAN) 4 MG tablet Take 1 tablet by mouth every 6 hours as needed for Nausea or Vomiting 10 tablet 0    atorvastatin (LIPITOR) 20 MG tablet Take 20 mg by mouth daily      ondansetron (ZOFRAN ODT) 4 MG disintegrating tablet Take 1 tablet by mouth every 8 hours as needed for Nausea 12 tablet 0    Calcium Carb-Cholecalciferol (CALCIUM-VITAMIN D) 500-200 MG-UNIT per tablet Take 1 tablet by mouth daily       amLODIPine (NORVASC) 5 MG tablet Take 1 tablet by mouth daily 30 tablet 5    Denosumab (PROLIA SC) Inject into the skin      ALPRAZolam (XANAX) 0.5 MG tablet Take 0.5 mg by mouth as needed.  prn         Ciprofloxacin, Lisinopril, and Codeine  Social History     Tobacco Use   Smoking Status Never Smoker   Smokeless Tobacco Never Used      (If patient a smoker, smoking cessation counseling offered)   Social History     Substance and Sexual Activity   Alcohol Use Yes    Alcohol/week: 1.0 standard drinks    Types: 1 Shots of liquor per week    Comment: Started Alc 21 y.o; pt is very vague; in her 45s and 46s, partied more often than nowgetting drunk over the wkends--Vodka; since 89, getting drunk 2 x mo; past 2 years11-1/2 shot of whiskeymixed drink every day. DAVID--1/14/2018; No DUI or DT. REVIEW OF SYSTEMS:  Review of Systems    Physical Exam:    This a 68 y.o. female      Vitals:    10/19/21 1359   BP: 112/78   Temp: 97.2 °F (36.2 °C)     Body mass index is 28.28 kg/m². Physical Exam  Constitutional: Patient in no acute distress, ggod grooming, appropriately dressed  Neuro: Alert and oriented to person, place and time. Psych:Mood normal, affect normal  Skin: No rash noted  HEENT: Head: Normocephalic and atraumatic,Conjunctivae and EOM are normal,Nose- normal, Right/Left External Ear: normal, Mouth: Mucosa Moist  Neck: Supple  Lungs: Respiratory effort is normal  Cardiovascular: strong and regular, no lower leg edema  Abdomen: Soft, non-tender, non-distended with no CVA,    Lymphatics: No cervical palpable lymphadenopathy. Bladder non-tender and not distended. Musculoskeletal: Normal gait and station        Assessment and Plan      1. Kidney stones    2. Flank pain            Plan:    trial of passage    Prescriptions Ordered:  Orders Placed This Encounter   Medications    tamsulosin (FLOMAX) 0.4 MG capsule     Sig: Take 1 capsule by mouth daily     Dispense:  30 capsule     Refill:  0      Orders Placed:  Orders Placed This Encounter   Procedures    US RENAL LIMITED     This procedure can be scheduled via [employer]. Access your [employer] account by visiting [email or web address]. Standing Status:   Future     Standing Expiration Date:   10/14/2022            Grant Sena MD    Agree with the ROS entered by the MA.

## 2021-10-19 NOTE — PROGRESS NOTES
Review of Systems   Constitutional: Negative for appetite change, chills and fever. Eyes: Negative for pain, redness and visual disturbance. Respiratory: Positive for cough. Negative for shortness of breath and wheezing. Cardiovascular: Negative for chest pain and leg swelling. Gastrointestinal: Positive for abdominal pain and constipation. Negative for diarrhea, nausea and vomiting. Genitourinary: Positive for hematuria. Negative for difficulty urinating, dysuria, flank pain, frequency and urgency. Musculoskeletal: Positive for back pain. Negative for joint swelling and myalgias. Skin: Negative for rash and wound. Neurological: Positive for dizziness and numbness. Negative for tremors. Hematological: Does not bruise/bleed easily.

## 2021-10-27 ENCOUNTER — TELEPHONE (OUTPATIENT)
Dept: UROLOGY | Age: 76
End: 2021-10-27

## 2021-10-27 NOTE — TELEPHONE ENCOUNTER
Patient called Karyle Sears and stated \"The medication that  prescribed me is causing me to have dizzy spells, is there anything else I could take? I also want to know if it is okay to drink tea with these kidney stones. \" Writer let patient that we would speak with  on Thursday.     Pt verbalized understanding

## 2021-11-08 ENCOUNTER — HOSPITAL ENCOUNTER (OUTPATIENT)
Age: 76
Discharge: HOME | End: 2021-11-08
Payer: MEDICARE

## 2021-11-08 ENCOUNTER — HOSPITAL ENCOUNTER (OUTPATIENT)
Dept: ULTRASOUND IMAGING | Age: 76
Discharge: HOME | End: 2021-11-10
Payer: MEDICARE

## 2021-11-08 DIAGNOSIS — N20.0 KIDNEY STONES: ICD-10-CM

## 2021-11-08 LAB — CALCIUM SERPL-MCNC: 9.8 MG/DL (ref 8.6–10.4)

## 2021-11-08 PROCEDURE — 76775 US EXAM ABDO BACK WALL LIM: CPT

## 2021-11-08 PROCEDURE — 82310 ASSAY OF CALCIUM: CPT

## 2021-11-08 PROCEDURE — 36415 COLL VENOUS BLD VENIPUNCTURE: CPT

## 2021-11-16 ENCOUNTER — OFFICE VISIT (OUTPATIENT)
Dept: UROLOGY | Age: 76
End: 2021-11-16
Payer: MEDICARE

## 2021-11-16 VITALS
WEIGHT: 143 LBS | DIASTOLIC BLOOD PRESSURE: 93 MMHG | HEIGHT: 59 IN | BODY MASS INDEX: 28.83 KG/M2 | SYSTOLIC BLOOD PRESSURE: 124 MMHG | HEART RATE: 76 BPM | TEMPERATURE: 96.6 F

## 2021-11-16 DIAGNOSIS — N28.1 RENAL CYST: ICD-10-CM

## 2021-11-16 DIAGNOSIS — N20.0 KIDNEY STONES: Primary | ICD-10-CM

## 2021-11-16 PROCEDURE — G8399 PT W/DXA RESULTS DOCUMENT: HCPCS | Performed by: UROLOGY

## 2021-11-16 PROCEDURE — 1123F ACP DISCUSS/DSCN MKR DOCD: CPT | Performed by: UROLOGY

## 2021-11-16 PROCEDURE — 4040F PNEUMOC VAC/ADMIN/RCVD: CPT | Performed by: UROLOGY

## 2021-11-16 PROCEDURE — G8427 DOCREV CUR MEDS BY ELIG CLIN: HCPCS | Performed by: UROLOGY

## 2021-11-16 PROCEDURE — G8484 FLU IMMUNIZE NO ADMIN: HCPCS | Performed by: UROLOGY

## 2021-11-16 PROCEDURE — 1036F TOBACCO NON-USER: CPT | Performed by: UROLOGY

## 2021-11-16 PROCEDURE — 99213 OFFICE O/P EST LOW 20 MIN: CPT | Performed by: UROLOGY

## 2021-11-16 PROCEDURE — 1090F PRES/ABSN URINE INCON ASSESS: CPT | Performed by: UROLOGY

## 2021-11-16 PROCEDURE — G8417 CALC BMI ABV UP PARAM F/U: HCPCS | Performed by: UROLOGY

## 2021-11-16 ASSESSMENT — ENCOUNTER SYMPTOMS
NAUSEA: 0
COUGH: 0
WHEEZING: 0
RESPIRATORY NEGATIVE: 1
EYES NEGATIVE: 1
BACK PAIN: 0
SHORTNESS OF BREATH: 0
DIARRHEA: 0
EYE REDNESS: 0
CONSTIPATION: 0
VOMITING: 0
GASTROINTESTINAL NEGATIVE: 1
EYE PAIN: 0
ABDOMINAL PAIN: 0

## 2021-11-16 NOTE — PROGRESS NOTES
[address] [address]  Dept: [address] [facility] [facility] Urology Office Note - Established    Patient:  Randell Hardy  YOB: 1945  Date: 11/16/2021    The patient is a 68 y.o. female whopresents today for evaluation of the following problems:   Chief Complaint   Patient presents with    Follow-up     4 weeks with US       HPI  Here for kidney stones and renal cyst. rneal u/s reviewed and shows small nonobs stones and renal cysts, no solid masses  No dysuria,no hematuria, urinates. Summary of old records: N/A    Additional History: N/A    Procedures Today: N/A    Urinalysis today:  No results found for this visit on 11/16/21.     Imaging Reviewed during this Office Visit: none  (results were independently reviewed by physician and radiology report verified)    AUA Symptom Score (11/16/2021):                               Last BUN and creatinine:  Lab Results   Component Value Date    BUN 21 10/15/2021     Lab Results   Component Value Date    CREATININE 0.84 10/15/2021       Additional Lab/Culture results: none    PAST MEDICAL, FAMILY AND SOCIAL HISTORY UPDATE:  Past Medical History:   Diagnosis Date    Anxiety     Arthritis     Cataracts, bilateral     Depression     Diabetes mellitus ([address]     pre diabetic    Hearing loss     Hyperlipidemia     Hypertension     MS (multiple sclerosis) ([address]     Osteoarthritis     Osteoporosis      Past Surgical History:   Procedure Laterality Date    CERVICAL DISCECTOMY  04/22/2020    Dr. Ivone Garvin a [facility] [address]  04/22/2020    [facility], Ivone Garvin MD    EYE SURGERY      x3   [address] [facility]  05/2017    ingial    KNEE SURGERY      menicus right 2007    SHOULDER SURGERY      right rotator cuff 2016     Family History   Adopted: Yes     Outpatient Medications Marked as Taking for the 11/16/21 encounter (Office Visit) with Julio Ledesma MD   Medication Sig Dispense Refill    oxybutynin (DITROPAN) 5 MG tablet       HYDROcodone-acetaminophen (NORCO) 5-325 MG per tablet       tamsulosin (FLOMAX) 0.4 MG capsule Take 1 capsule by mouth daily 30 capsule 0    ondansetron (ZOFRAN) 4 MG tablet Take 1 tablet by mouth every 6 hours as needed for Nausea or Vomiting 10 tablet 0    atorvastatin (LIPITOR) 20 MG tablet Take 20 mg by mouth daily      ondansetron (ZOFRAN ODT) 4 MG disintegrating tablet Take 1 tablet by mouth every 8 hours as needed for Nausea 12 tablet 0    Calcium Carb-Cholecalciferol (CALCIUM-VITAMIN D) 500-200 MG-UNIT per tablet Take 1 tablet by mouth daily       amLODIPine (NORVASC) 5 MG tablet Take 1 tablet by mouth daily 30 tablet 5    Denosumab (PROLIA SC) Inject into the skin      ALPRAZolam (XANAX) 0.5 MG tablet Take 0.5 mg by mouth as needed. prn        (All medications reviewed and updated by provider sincelast office visit or hospitalization)   Ciprofloxacin, Lisinopril, and Codeine  Social History     Tobacco Use   Smoking Status Never Smoker   Smokeless Tobacco Never Used      (If patient a smoker, smoking cessation counseling offered)     Social History     Substance and Sexual Activity   Alcohol Use Yes    Alcohol/week: 1.0 standard drink    Types: 1 Shots of liquor per week    Comment: Started Alc 21 y.o; pt is very vague; in her 45s and 46s, partied more often than nowgetting drunk over the wkends--Vodka; since 89, getting drunk 2 x mo; past 2 years11-1/2 shot of whiskeymixed drink every day. DAVID--1/14/2018; No DUI or DT. REVIEW OF SYSTEMS:  Review of Systems      Physical Exam:      Vitals:    11/16/21 1444   BP: (!) 124/93   Pulse: 76   Temp: 96.6 °F (35.9 °C)     Body mass index is 28.88 kg/m². Patient is a 68 y.o. female in noacute distress and alert and oriented to person, place and time. Physical Exam  Constitutional: Patient in no acute distress.   Neuro: Alert andoriented to person, place and time. Psych: Mood normal, affect normal  Skin: No rash noted  HEENT: Head: Normocephalic and atraumatic  Conjunctivae and EOM are normal. Pupils are equal, round  Nose: Normal  Right External Ear: Normal; Left External Ear: Normal  Mouth: Mucosa Moist  Neck: Supple  Lungs: Respiratory effort is normal  Cardiovascular: Warm & Pink  Abdomen: Soft, non-tender, non-distended with no CVA,  No flank       and Plan      1. Kidney stones    2. Renal cyst           Plan:      Return in about 6 months (around 5/16/2022) for Follow up. Prescriptions Ordered:  No orders of the defined types were placed in this encounter. Orders Placed:  Orders Placed This Encounter   Procedures    US RENAL LIMITED     This procedure can be scheduled via [employer]. Access your [employer] account by visiting [email or web address]. Standing Status:   Future     Standing Expiration Date:   11/11/2022            Nisha Townsend MD    Agree with the ROS entered by the MA.

## 2022-05-16 ENCOUNTER — TELEPHONE (OUTPATIENT)
Dept: UROLOGY | Age: 77
End: 2022-05-16

## 2022-12-31 ENCOUNTER — APPOINTMENT (OUTPATIENT)
Dept: CT IMAGING | Age: 77
End: 2022-12-31
Payer: MEDICARE

## 2022-12-31 ENCOUNTER — HOSPITAL ENCOUNTER (EMERGENCY)
Age: 77
Discharge: HOME | End: 2022-12-31
Attending: STUDENT IN AN ORGANIZED HEALTH CARE EDUCATION/TRAINING PROGRAM
Payer: MEDICARE

## 2022-12-31 VITALS
DIASTOLIC BLOOD PRESSURE: 65 MMHG | TEMPERATURE: 98.4 F | WEIGHT: 140 LBS | BODY MASS INDEX: 28.22 KG/M2 | HEART RATE: 77 BPM | HEIGHT: 59 IN | RESPIRATION RATE: 16 BRPM | SYSTOLIC BLOOD PRESSURE: 155 MMHG | OXYGEN SATURATION: 95 %

## 2022-12-31 DIAGNOSIS — R10.9 ABDOMINAL PAIN, UNSPECIFIED ABDOMINAL LOCATION: Primary | ICD-10-CM

## 2022-12-31 DIAGNOSIS — K59.00 CONSTIPATION, UNSPECIFIED CONSTIPATION TYPE: ICD-10-CM

## 2022-12-31 DIAGNOSIS — R11.2 NAUSEA AND VOMITING, UNSPECIFIED VOMITING TYPE: ICD-10-CM

## 2022-12-31 LAB
ABSOLUTE EOS #: 0.2 K/UL (ref 0–0.4)
ABSOLUTE LYMPH #: 1.9 K/UL (ref 1–4.8)
ABSOLUTE MONO #: 0.6 K/UL (ref 0.1–1.2)
ALBUMIN SERPL-MCNC: 4.5 G/DL (ref 3.5–5.2)
ALBUMIN/GLOBULIN RATIO: 1.6 (ref 1–2.5)
ALP BLD-CCNC: 128 U/L (ref 35–104)
ALT SERPL-CCNC: 17 U/L (ref 5–33)
AMORPHOUS: ABNORMAL
ANION GAP SERPL CALCULATED.3IONS-SCNC: 15 MMOL/L (ref 9–17)
AST SERPL-CCNC: 18 U/L
BACTERIA: ABNORMAL
BASOPHILS # BLD: 1 % (ref 0–2)
BASOPHILS ABSOLUTE: 0.1 K/UL (ref 0–0.2)
BILIRUB SERPL-MCNC: 0.7 MG/DL (ref 0.3–1.2)
BILIRUBIN URINE: NEGATIVE
BUN BLDV-MCNC: 18 MG/DL (ref 8–23)
CALCIUM SERPL-MCNC: 10.8 MG/DL (ref 8.6–10.4)
CHLORIDE BLD-SCNC: 105 MMOL/L (ref 98–107)
CO2: 23 MMOL/L (ref 20–31)
COLOR: YELLOW
CREAT SERPL-MCNC: 0.69 MG/DL (ref 0.5–0.9)
EOSINOPHILS RELATIVE PERCENT: 2 % (ref 1–4)
EPITHELIAL CELLS UA: ABNORMAL /HPF (ref 0–5)
GFR SERPL CREATININE-BSD FRML MDRD: >60 ML/MIN/1.73M2
GLUCOSE BLD-MCNC: 173 MG/DL (ref 70–99)
GLUCOSE URINE: NEGATIVE
HCT VFR BLD CALC: 43.9 % (ref 36–46)
HEMOGLOBIN: 14.5 G/DL (ref 12–16)
KETONES, URINE: NEGATIVE
LEUKOCYTE ESTERASE, URINE: ABNORMAL
LIPASE: 24 U/L (ref 13–60)
LYMPHOCYTES # BLD: 26 % (ref 24–44)
MCH RBC QN AUTO: 31.4 PG (ref 26–34)
MCHC RBC AUTO-ENTMCNC: 33 G/DL (ref 31–37)
MCV RBC AUTO: 95.4 FL (ref 80–100)
MONOCYTES # BLD: 8 % (ref 2–11)
NITRITE, URINE: NEGATIVE
OTHER OBSERVATIONS UA: ABNORMAL
PDW BLD-RTO: 15.5 % (ref 12.5–15.4)
PH UA: 7 (ref 5–8)
PLATELET # BLD: 305 K/UL (ref 140–450)
PMV BLD AUTO: 8.2 FL (ref 6–12)
POTASSIUM SERPL-SCNC: 3.4 MMOL/L (ref 3.7–5.3)
PROTEIN UA: NEGATIVE
RBC # BLD: 4.6 M/UL (ref 4–5.2)
RBC UA: ABNORMAL /HPF (ref 0–2)
SEG NEUTROPHILS: 63 % (ref 36–66)
SEGMENTED NEUTROPHILS ABSOLUTE COUNT: 4.5 K/UL (ref 1.8–7.7)
SODIUM BLD-SCNC: 143 MMOL/L (ref 135–144)
SPECIFIC GRAVITY UA: 1.01 (ref 1–1.03)
TOTAL PROTEIN: 7.3 G/DL (ref 6.4–8.3)
TURBIDITY: CLEAR
URINE HGB: NEGATIVE
UROBILINOGEN, URINE: NORMAL
WBC # BLD: 7.1 K/UL (ref 3.5–11)
WBC UA: ABNORMAL /HPF (ref 0–5)

## 2022-12-31 PROCEDURE — 83690 ASSAY OF LIPASE: CPT

## 2022-12-31 PROCEDURE — 81001 URINALYSIS AUTO W/SCOPE: CPT

## 2022-12-31 PROCEDURE — 96372 THER/PROPH/DIAG INJ SC/IM: CPT

## 2022-12-31 PROCEDURE — 6360000004 HC RX CONTRAST MEDICATION: Performed by: STUDENT IN AN ORGANIZED HEALTH CARE EDUCATION/TRAINING PROGRAM

## 2022-12-31 PROCEDURE — 85025 COMPLETE CBC W/AUTO DIFF WBC: CPT

## 2022-12-31 PROCEDURE — 6360000002 HC RX W HCPCS: Performed by: STUDENT IN AN ORGANIZED HEALTH CARE EDUCATION/TRAINING PROGRAM

## 2022-12-31 PROCEDURE — 74177 CT ABD & PELVIS W/CONTRAST: CPT

## 2022-12-31 PROCEDURE — 96375 TX/PRO/DX INJ NEW DRUG ADDON: CPT

## 2022-12-31 PROCEDURE — 96361 HYDRATE IV INFUSION ADD-ON: CPT

## 2022-12-31 PROCEDURE — 96374 THER/PROPH/DIAG INJ IV PUSH: CPT

## 2022-12-31 PROCEDURE — 2580000003 HC RX 258: Performed by: STUDENT IN AN ORGANIZED HEALTH CARE EDUCATION/TRAINING PROGRAM

## 2022-12-31 PROCEDURE — 80053 COMPREHEN METABOLIC PANEL: CPT

## 2022-12-31 PROCEDURE — 36415 COLL VENOUS BLD VENIPUNCTURE: CPT

## 2022-12-31 PROCEDURE — 99285 EMERGENCY DEPT VISIT HI MDM: CPT

## 2022-12-31 RX ORDER — DICYCLOMINE HYDROCHLORIDE 10 MG/ML
20 INJECTION INTRAMUSCULAR ONCE
Status: COMPLETED | OUTPATIENT
Start: 2022-12-31 | End: 2022-12-31

## 2022-12-31 RX ORDER — FENTANYL CITRATE 50 UG/ML
25 INJECTION, SOLUTION INTRAMUSCULAR; INTRAVENOUS ONCE
Status: COMPLETED | OUTPATIENT
Start: 2022-12-31 | End: 2022-12-31

## 2022-12-31 RX ORDER — 0.9 % SODIUM CHLORIDE 0.9 %
80 INTRAVENOUS SOLUTION INTRAVENOUS ONCE
Status: DISCONTINUED | OUTPATIENT
Start: 2022-12-31 | End: 2022-12-31 | Stop reason: HOSPADM

## 2022-12-31 RX ORDER — 0.9 % SODIUM CHLORIDE 0.9 %
1000 INTRAVENOUS SOLUTION INTRAVENOUS ONCE
Status: COMPLETED | OUTPATIENT
Start: 2022-12-31 | End: 2022-12-31

## 2022-12-31 RX ORDER — ONDANSETRON 2 MG/ML
4 INJECTION INTRAMUSCULAR; INTRAVENOUS ONCE
Status: COMPLETED | OUTPATIENT
Start: 2022-12-31 | End: 2022-12-31

## 2022-12-31 RX ORDER — SODIUM CHLORIDE 0.9 % (FLUSH) 0.9 %
10 SYRINGE (ML) INJECTION PRN
Status: DISCONTINUED | OUTPATIENT
Start: 2022-12-31 | End: 2022-12-31 | Stop reason: HOSPADM

## 2022-12-31 RX ADMIN — IOPAMIDOL 75 ML: 755 INJECTION, SOLUTION INTRAVENOUS at 05:57

## 2022-12-31 RX ADMIN — Medication 80 ML: at 05:57

## 2022-12-31 RX ADMIN — SODIUM CHLORIDE 1000 ML: 9 INJECTION, SOLUTION INTRAVENOUS at 05:17

## 2022-12-31 RX ADMIN — ONDANSETRON 4 MG: 2 INJECTION INTRAMUSCULAR; INTRAVENOUS at 05:18

## 2022-12-31 RX ADMIN — DICYCLOMINE HYDROCHLORIDE 20 MG: 20 INJECTION, SOLUTION INTRAMUSCULAR at 05:24

## 2022-12-31 RX ADMIN — FENTANYL CITRATE 25 MCG: 50 INJECTION, SOLUTION INTRAMUSCULAR; INTRAVENOUS at 05:19

## 2022-12-31 RX ADMIN — SODIUM CHLORIDE, PRESERVATIVE FREE 10 ML: 5 INJECTION INTRAVENOUS at 05:57

## 2022-12-31 ASSESSMENT — PAIN SCALES - GENERAL
PAINLEVEL_OUTOF10: 2
PAINLEVEL_OUTOF10: 2

## 2022-12-31 ASSESSMENT — PAIN DESCRIPTION - DESCRIPTORS
DESCRIPTORS: CRAMPING

## 2022-12-31 ASSESSMENT — PAIN DESCRIPTION - ORIENTATION
ORIENTATION: LOWER
ORIENTATION: LOWER

## 2022-12-31 ASSESSMENT — PAIN DESCRIPTION - LOCATION
LOCATION: ABDOMEN

## 2022-12-31 ASSESSMENT — PAIN - FUNCTIONAL ASSESSMENT: PAIN_FUNCTIONAL_ASSESSMENT: 0-10

## 2022-12-31 NOTE — ED PROVIDER NOTES
ADDENDUM:        The patient was seen for Abdominal Pain, Nausea, Emesis (Pt arrives with co nausea vomiting since 0230 am. Pt states she has known colitis and is usually able to have a bowel movement however pt states she is now constipated. ), and Constipation  . The patient's initial evaluation and plan have been discussed with the prior provider who initially evaluated the patient. Nursing Notes, Past Medical Hx, Past Surgical Hx, Social Hx, Allergies, and Family Hx were all reviewed. PAST MEDICAL HISTORY    has a past medical history of Anxiety, Arthritis, Cataracts, bilateral, Depression, Diabetes mellitus ([address] Hearing loss, Hyperlipidemia, Hypertension, MS (multiple sclerosis) ([address] Osteoarthritis, and Osteoporosis. SURGICAL HISTORY      has a past surgical history that includes knee surgery; shoulder surgery; hernia repair (05/2017); eye surgery; Cervical discectomy (04/22/2020); and cervical fusion (04/22/2020). CURRENT MEDICATIONS       Previous Medications    ALPRAZOLAM (XANAX) 0.5 MG TABLET    Take 0.5 mg by mouth as needed. prn    AMLODIPINE (NORVASC) 5 MG TABLET    Take 1 tablet by mouth daily    ATORVASTATIN (LIPITOR) 20 MG TABLET    Take 20 mg by mouth daily    CALCIUM CARB-CHOLECALCIFEROL (CALCIUM-VITAMIN D) 500-200 MG-UNIT PER TABLET    Take 1 tablet by mouth daily     DENOSUMAB (PROLIA SC)    Inject into the skin    HYDROCODONE-ACETAMINOPHEN (NORCO) 5-325 MG PER TABLET        ONDANSETRON (ZOFRAN ODT) 4 MG DISINTEGRATING TABLET    Take 1 tablet by mouth every 8 hours as needed for Nausea    ONDANSETRON (ZOFRAN) 4 MG TABLET    Take 1 tablet by mouth every 6 hours as needed for Nausea or Vomiting    OXYBUTYNIN (DITROPAN) 5 MG TABLET        TAMSULOSIN (FLOMAX) 0.4 MG CAPSULE    Take 1 capsule by mouth daily       ALLERGIES     is allergic to ciprofloxacin, lisinopril, and codeine. FAMILY HISTORY     is adopted. family history is not on file. She was adopted.     SOCIAL HISTORY      reports that she has never smoked. She has never used smokeless tobacco. She reports current alcohol use of about 1.0 standard drink per week. She reports that she does not use drugs.     Diagnostic Results     EKG         LABS:   Results for orders placed or performed during the hospital encounter of 12/31/22   CBC with Auto Differential   Result Value Ref Range    WBC 7.1 3.5 - 11.0 k/uL    RBC 4.60 4.0 - 5.2 m/uL    Hemoglobin 14.5 12.0 - 16.0 g/dL    Hematocrit 43.9 36 - 46 %    MCV 95.4 80 - 100 fL    MCH 31.4 26 - 34 pg    MCHC 33.0 31 - 37 g/dL    RDW 15.5 (H) 12.5 - 15.4 %    Platelets 885 418 - 500 k/uL    MPV 8.2 6.0 - 12.0 fL    Seg Neutrophils 63 36 - 66 %    Lymphocytes 26 24 - 44 %    Monocytes 8 2 - 11 %    Eosinophils % 2 1 - 4 %    Basophils 1 0 - 2 %    Segs Absolute 4.50 1.8 - 7.7 k/uL    Absolute Lymph # 1.90 1.0 - 4.8 k/uL    Absolute Mono # 0.60 0.1 - 1.2 k/uL    Absolute Eos # 0.20 0.0 - 0.4 k/uL    Basophils Absolute 0.10 0.0 - 0.2 k/uL   Comprehensive Metabolic Panel   Result Value Ref Range    Glucose 173 (H) 70 - 99 mg/dL    BUN 18 8 - 23 mg/dL    Creatinine 0.69 0.50 - 0.90 mg/dL    Est, Glom Filt Rate >60 >60 mL/min/1.73m2    Calcium 10.8 (H) 8.6 - 10.4 mg/dL    Sodium 143 135 - 144 mmol/L    Potassium 3.4 (L) 3.7 - 5.3 mmol/L    Chloride 105 98 - 107 mmol/L    CO2 23 20 - 31 mmol/L    Anion Gap 15 9 - 17 mmol/L    Alkaline Phosphatase 128 (H) 35 - 104 U/L    ALT 17 5 - 33 U/L    AST 18 <32 U/L    Total Bilirubin 0.7 0.3 - 1.2 mg/dL    Total Protein 7.3 6.4 - 8.3 g/dL    Albumin 4.5 3.5 - 5.2 g/dL    Albumin/Globulin Ratio 1.6 1.0 - 2.5   Lipase   Result Value Ref Range    Lipase 24 13 - 60 U/L   Urinalysis with Reflex to Culture    Specimen: Urine   Result Value Ref Range    Color, UA Yellow Yellow    Turbidity UA Clear Clear    Glucose, Ur NEGATIVE NEGATIVE    Bilirubin Urine NEGATIVE NEGATIVE    Ketones, Urine NEGATIVE NEGATIVE    Specific Gravity, UA 1.015 1.005 - 1.030 Urine Hgb NEGATIVE NEGATIVE    pH, UA 7.0 5.0 - 8.0    Protein, UA NEGATIVE NEGATIVE    Urobilinogen, Urine Normal Normal    Nitrite, Urine NEGATIVE NEGATIVE    Leukocyte Esterase, Urine TRACE (A) NEGATIVE   Microscopic Urinalysis   Result Value Ref Range    WBC, UA 0 TO 2 0 - 5 /HPF    RBC, UA 0 TO 2 0 - 2 /HPF    Epithelial Cells UA 0 TO 2 0 - 5 /HPF    Bacteria, UA FEW (A) None    Amorphous, UA 1+ (A) None    Other Observations UA (A) NOT REQ. Utilizing a urinalysis as the only screening method to exclude a potential uropathogen can be unreliable in many patient populations. Rapid screening tests are less sensitive than culture and if UTI is a clinical possibility, culture should be considered despite a negative urinalysis. RADIOLOGY:  CT ABDOMEN PELVIS W IV CONTRAST Additional Contrast? None   Final Result   1. No acute process. 2. Bilateral nonobstructive nephrolithiasis. 3. 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 Recommend follow-up pancreas protocol MRI in 1 year. ED Course     The patient was given the following medications:  Orders Placed This Encounter   Medications    ondansetron (ZOFRAN) injection 4 mg    dicyclomine (BENTYL) injection 20 mg    0.9 % sodium chloride bolus    fentaNYL (SUBLIMAZE) injection 25 mcg    iopamidol (ISOVUE-370) 76 % injection 75 mL    0.9 % sodium chloride bolus    sodium chloride flush 0.9 % injection 10 mL         RECENT VITALS:  BP (!) 155/65   Pulse 77   Temp 98.4 °F (36.9 °C) (Oral)   Resp 16   Ht 4' 11\" (1.499 m)   Wt 63.5 kg (140 lb)   SpO2 95%   BMI 28.28 kg/m²       8:20 AM care transferred from Dr. Oumou Hampton at 7 AM patient was awaiting CAT scan results. Presented with nausea and vomiting this morning. Normal meal of steak and potatoes with green beans last night.   Complaining of lower abdominal pain with a history of colitis has a GI specialist.  She is concerned about being constipated but having stool incontinence. She states that over the Christmas vacation she went to [place] and stopped her regimen of laxatives but needs to start them again. She is not sure why she is not having bowel movements however she says she is not eating a lot and she also is incontinent of stool. Her CAT scan was unremarkable for acute intra-abdominal pathology no infection or perforation. Normal CBC no anemia normal white blood cell count normal electrolytes and kidney function with slightly elevated calcium. Urine no signs of infection some trace leukocytes but no nitrites and 0-2 white blood cells with only few bacteria will await cultures. Encouraged to increase fluids and fiber follow-up with GI specialist and return if worsening symptoms or other concerns. She has Norco at home but it was explained that this can add to her constipation which she is aware of and states that she cannot really take pain medication because it makes her nauseous she does have Zofran at home as well. The patient and her  understands that at this time there is no evidence for a more malignant underlying process, but also understands that early in the process of an illness or injury, an emergency department workup can be falsely reassuring. Routine discharge counseling was given, and it is understood that worsening, changing or persistent symptoms should prompt an immediate call or follow up with their primary physician or return to the emergency department. The importance of appropriate follow up was also discussed. I have reviewed the disposition diagnosis. I have answered the questions and given discharge instructions. There was voiced understanding of these instructions and no further questions or complaints. OARRS Report if indicated             I have reviewed the disposition diagnosis with the patient and or their family/guardian.  I have answered their questions and given discharge instructions. They voiced understanding of these instructions and did not have any further questions or complaints. Disposition     CLINICAL IMPRESSION:  1. Abdominal pain, unspecified abdominal location    2. Constipation, unspecified constipation type    3. Nausea and vomiting, unspecified vomiting type        PATIENT REFERRED TO:  GI specialist    Schedule an appointment as soon as possible for a visit in 2 days      DISCHARGE MEDICATIONS:  New Prescriptions    No medications on file       DISPOSITION:   DISPOSITION Decision To Discharge 12/31/2022 08:28:01 AM      (Please note that portions of this note were completed with a voice recognition program.  Efforts were made to edit the dictations but occasionally words are mis-transcribed.)    DO BELEN FaulknerO.          Pippa Olguin DO  12/31/22 0829

## 2022-12-31 NOTE — ED PROVIDER NOTES
[address]      Pt Name: Maria Victoria Oneal  MRN: [id number]  [place] 1945  Date of evaluation: 12/31/2022  Provider: Consuelo Bello DO    CHIEF COMPLAINT       Chief Complaint   Patient presents with    Abdominal Pain    Nausea    Emesis     Pt arrives with co nausea vomiting since 0230 am. Pt states she has known colitis and is usually able to have a bowel movement however pt states she is now constipated. Constipation         HISTORY OF PRESENT ILLNESS   (Location/Symptom, Timing/Onset, Context/Setting, Quality, Duration, Modifying Factors, Severity)  Note limiting factors. Maria Victoria Oneal is a 68 y.o. female who presents to the emergency department with abdominal pain, nausea and vomiting. Patient states that since around 2:30 AM she has been having multiple episodes of nausea and vomiting. Patient has also been having left lower quadrant abdominal cramping which has been waxing and waning without any provoking or relieving factors. She states that she has a history of \"colitis\" in the past but cannot tell me any more specific information about it. Denies any abdominal surgeries. Denies any fevers, chills, sweats, chest pain or difficulty breathing, dysuria, hematuria, bloody stools. HPI    Nursing Notes were reviewed. REVIEW OF SYSTEMS    (2-9 systems for level 4, 10 or more for level 5)     Review of Systems   Constitutional:  Negative for chills and fever. HENT:  Negative for congestion, facial swelling and sore throat. Eyes:  Negative for visual disturbance. Respiratory:  Negative for cough, shortness of breath and wheezing. Cardiovascular:  Negative for chest pain, palpitations and leg swelling. Gastrointestinal:  Positive for abdominal pain, nausea and vomiting. Negative for blood in stool and diarrhea. Genitourinary:  Negative for dysuria and hematuria.    Musculoskeletal:  Negative for back pain and neck pain. Skin:  Negative for rash. Neurological:  Negative for syncope, weakness, numbness and headaches. Hematological:  Does not bruise/bleed easily. Except as noted above the remainder of the review of systems was reviewed and negative. PAST MEDICAL HISTORY     Past Medical History:   Diagnosis Date    Anxiety     Arthritis     Cataracts, bilateral     Depression     Diabetes mellitus ([address]     pre diabetic    Hearing loss     Hyperlipidemia     Hypertension     MS (multiple sclerosis) [address] [address]     Osteoarthritis     Osteoporosis          SURGICAL HISTORY       Past Surgical History:   Procedure Laterality Date    CERVICAL DISCECTOMY  04/22/2020    Dr. Shayy Horton a [address]  04/22/2020    [facility] [place], Shayy Horton MD    EYE SURGERY      x3    HERNIA REPAIR  05/2017    ingial    KNEE SURGERY      menicus right 2007    SHOULDER SURGERY      right rotator cuff 2016         CURRENT MEDICATIONS       Previous Medications    ALPRAZOLAM (XANAX) 0.5 MG TABLET    Take 0.5 mg by mouth as needed.  prn    AMLODIPINE (NORVASC) 5 MG TABLET    Take 1 tablet by mouth daily    ATORVASTATIN (LIPITOR) 20 MG TABLET    Take 20 mg by mouth daily    CALCIUM CARB-CHOLECALCIFEROL (CALCIUM-VITAMIN D) 500-200 MG-UNIT PER TABLET    Take 1 tablet by mouth daily     DENOSUMAB (PROLIA SC)    Inject into the skin    HYDROCODONE-ACETAMINOPHEN (NORCO) 5-325 MG PER TABLET        ONDANSETRON (ZOFRAN ODT) 4 MG DISINTEGRATING TABLET    Take 1 tablet by mouth every 8 hours as needed for Nausea    ONDANSETRON (ZOFRAN) 4 MG TABLET    Take 1 tablet by mouth every 6 hours as needed for Nausea or Vomiting    OXYBUTYNIN (DITROPAN) 5 MG TABLET        TAMSULOSIN (FLOMAX) 0.4 MG CAPSULE    Take 1 capsule by mouth daily       ALLERGIES     Ciprofloxacin, Lisinopril, and Codeine    FAMILY HISTORY       Family History   Adopted: Yes          SOCIAL HISTORY       Social History     Socioeconomic History    Marital status:      Spouse name: None    Number of children: None    Years of education: None    Highest education level: None   Tobacco Use    Smoking status: Never    Smokeless tobacco: Never   Vaping Use    Vaping Use: Never used   Substance and Sexual Activity    Alcohol use: Yes     Alcohol/week: 1.0 standard drink     Types: 1 Shots of liquor per week     Comment: Started Alc 21 y.o; pt is very vague; in her 45s and 46s, partied more often than now-getting drunk over the wkends--Vodka; since 89, getting drunk 2 x mo; past 2 years-1-1-1/2 shot of whiskey-mixed drink every day. DAVID--1/14/2018; No DUI or DT. Drug use: No    Sexual activity: Yes     Partners: Male       SCREENINGS        Sharif Coma Scale  Eye Opening: Spontaneous  Best Verbal Response: Oriented  Best Motor Response: Obeys commands  Sharif Coma Scale Score: 15               PHYSICAL EXAM    (up to 7 for level 4, 8 or more for level 5)     ED Triage Vitals [12/31/22 0503]   BP Temp Temp Source Heart Rate Resp SpO2 Height Weight   (!) 139/111 98.4 °F (36.9 °C) Oral 77 16 96 % 4' 11\" (1.499 m) 140 lb (63.5 kg)       Physical Exam  Vitals and nursing note reviewed. Constitutional:       General: She is not in acute distress. Appearance: Normal appearance. She is not ill-appearing, toxic-appearing or diaphoretic. HENT:      Head: Normocephalic and atraumatic. Mouth/Throat:      Mouth: Mucous membranes are moist.      Pharynx: Oropharynx is clear. Eyes:      Extraocular Movements: Extraocular movements intact. Pupils: Pupils are equal, round, and reactive to light. Cardiovascular:      Rate and Rhythm: Normal rate and regular rhythm. Heart sounds: No murmur heard. No gallop. Pulmonary:      Effort: Pulmonary effort is normal. No respiratory distress. Breath sounds: Normal breath sounds. No wheezing or rales. Abdominal:      General: There is no distension. Palpations: Abdomen is soft. There is no mass. Tenderness: There is abdominal tenderness. There is no guarding or rebound. Hernia: No hernia is present. Comments: Mild tenderness to the left lower quadrant without peritoneal findings. Musculoskeletal:         General: No tenderness. Cervical back: Normal range of motion and neck supple. Right lower leg: No edema. Left lower leg: No edema. Skin:     General: Skin is warm and dry. Findings: No rash. Neurological:      General: No focal deficit present. Mental Status: She is alert and oriented to person, place, and time.        DIAGNOSTIC RESULTS     EKG: All EKG's are interpreted by the Emergency Department Physician who either signs or Co-signs this chart in the absence of a cardiologist.      RADIOLOGY:   Non-plain film images such as CT, Ultrasound and MRI are read by the radiologist. Plain radiographic images are visualized and preliminarily interpreted by the emergency physician with the below findings:        Interpretation per the Radiologist below, if available at the time of this note:    CT ABDOMEN PELVIS W IV CONTRAST Additional Contrast? None    (Results Pending)         ED BEDSIDE ULTRASOUND:   Performed by ED Physician - none    LABS:  Labs Reviewed   CBC WITH AUTO DIFFERENTIAL - Abnormal; Notable for the following components:       Result Value    RDW 15.5 (*)     All other components within normal limits   COMPREHENSIVE METABOLIC PANEL - Abnormal; Notable for the following components:    Glucose 173 (*)     Calcium 10.8 (*)     Potassium 3.4 (*)     Alkaline Phosphatase 128 (*)     All other components within normal limits   URINALYSIS WITH REFLEX TO CULTURE - Abnormal; Notable for the following components:    Leukocyte Esterase, Urine TRACE (*)     All other components within normal limits   MICROSCOPIC URINALYSIS - Abnormal; Notable for the following components:    Bacteria, UA FEW (*)     Amorphous, UA 1+ (*)     Other Observations UA   (*)     Value: Utilizing a urinalysis as the only screening method to exclude a potential uropathogen can be unreliable in many patient populations. Rapid screening tests are less sensitive than culture and if UTI is a clinical possibility, culture should be considered despite a negative urinalysis. All other components within normal limits   LIPASE       All other labs were within normal range or not returned as of this dictation. EMERGENCY DEPARTMENT COURSE and DIFFERENTIAL DIAGNOSIS/MDM:   Vitals:    Vitals:    12/31/22 0503 12/31/22 0528 12/31/22 0607   BP: (!) 139/111 (!) 152/83 (!) 155/65   Pulse: 77     Resp: 16     Temp: 98.4 °F (36.9 °C)     TempSrc: Oral     SpO2: 96% 94% 95%   Weight: 63.5 kg (140 lb)     Height: 4' 11\" (1.499 m)         Patient is a 70-year-old female with history of diverticulosis and diverticulitis in the past presenting with left lower quadrant abdominal pain. On exam vitals are normal patient is in no acute distress. Abdomen is soft with mild tenderness to the left lower quadrant. Abdominal labs and CT scan of the abdomen pelvis obtained. Lab work unremarkable. CT scan pending. Patient treated with IV fluids, Zofran, Bentyl, fentanyl. Patient signed out to Dr. Kaya Hahn. MDM        REASSESSMENT          CRITICAL CARE TIME     CONSULTS:  None    PROCEDURES:  Unless otherwise noted below, none     Procedures      FINAL IMPRESSION    No diagnosis found. DISPOSITION/PLAN   DISPOSITION        PATIENT REFERRED TO:  No follow-up provider specified. DISCHARGE MEDICATIONS:  New Prescriptions    No medications on file     Controlled Substances Monitoring:     RX Monitoring 1/20/2020   Periodic Controlled Substance Monitoring No signs of potential drug abuse or diversion identified.        (Please note that portions of this note were completed with a voice recognition program.  Efforts were made to edit the dictations but occasionally words are mis-transcribed.)    Pavan Kelley,  (electronically signed)  Attending Emergency Physician            Lynn Zuniga DO  12/31/22 6192

## 2022-12-31 NOTE — DISCHARGE INSTRUCTIONS
Return immediately if any worsening symptoms or any other concerns    Tell us how we did visit: [email or web address]   and let us know about your experience

## 2023-02-17 ENCOUNTER — TELEPHONE (OUTPATIENT)
Dept: UROLOGY | Age: 78
End: 2023-02-17

## 2023-03-29 ENCOUNTER — APPOINTMENT (OUTPATIENT)
Dept: GENERAL RADIOLOGY | Age: 78
End: 2023-03-29
Payer: MEDICARE

## 2023-03-29 ENCOUNTER — HOSPITAL ENCOUNTER (OUTPATIENT)
Age: 78
Setting detail: OBSERVATION
Discharge: HOME | End: 2023-03-30
Attending: EMERGENCY MEDICINE
Payer: MEDICARE

## 2023-03-29 ENCOUNTER — APPOINTMENT (OUTPATIENT)
Dept: CT IMAGING | Age: 78
End: 2023-03-29
Payer: MEDICARE

## 2023-03-29 DIAGNOSIS — R51.9 NONINTRACTABLE EPISODIC HEADACHE, UNSPECIFIED HEADACHE TYPE: ICD-10-CM

## 2023-03-29 DIAGNOSIS — I10 PRIMARY HYPERTENSION: Primary | ICD-10-CM

## 2023-03-29 PROBLEM — I16.0 HYPERTENSIVE URGENCY: Status: ACTIVE | Noted: 2023-03-29

## 2023-03-29 LAB
ABSOLUTE EOS #: 0.2 K/UL (ref 0–0.4)
ABSOLUTE LYMPH #: 1.6 K/UL (ref 1–4.8)
ABSOLUTE MONO #: 0.5 K/UL (ref 0.1–1.2)
ALBUMIN SERPL-MCNC: 4.3 G/DL (ref 3.5–5.2)
ALBUMIN/GLOBULIN RATIO: 1.3 (ref 1–2.5)
ALP SERPL-CCNC: 71 U/L (ref 35–104)
ALT SERPL-CCNC: 13 U/L (ref 5–33)
ANION GAP SERPL CALCULATED.3IONS-SCNC: 12 MMOL/L (ref 9–17)
AST SERPL-CCNC: 17 U/L
BACTERIA: ABNORMAL
BASOPHILS # BLD: 1 % (ref 0–2)
BASOPHILS ABSOLUTE: 0 K/UL (ref 0–0.2)
BILIRUB SERPL-MCNC: 0.4 MG/DL (ref 0.3–1.2)
BILIRUBIN URINE: NEGATIVE
BUN SERPL-MCNC: 19 MG/DL (ref 8–23)
CALCIUM SERPL-MCNC: 10 MG/DL (ref 8.6–10.4)
CHLORIDE SERPL-SCNC: 106 MMOL/L (ref 98–107)
CO2 SERPL-SCNC: 24 MMOL/L (ref 20–31)
COLOR: YELLOW
CREAT SERPL-MCNC: 0.87 MG/DL (ref 0.5–0.9)
EOSINOPHILS RELATIVE PERCENT: 5 % (ref 1–4)
EPITHELIAL CELLS UA: ABNORMAL /HPF (ref 0–5)
GFR SERPL CREATININE-BSD FRML MDRD: >60 ML/MIN/1.73M2
GLUCOSE SERPL-MCNC: 101 MG/DL (ref 70–99)
GLUCOSE UR STRIP.AUTO-MCNC: NEGATIVE MG/DL
HCT VFR BLD AUTO: 41.9 % (ref 36–46)
HGB BLD-MCNC: 13.9 G/DL (ref 12–16)
KETONES UR STRIP.AUTO-MCNC: NEGATIVE MG/DL
LEUKOCYTE ESTERASE UR QL STRIP.AUTO: ABNORMAL
LYMPHOCYTES # BLD: 36 % (ref 24–44)
MCH RBC QN AUTO: 31.7 PG (ref 26–34)
MCHC RBC AUTO-ENTMCNC: 33.1 G/DL (ref 31–37)
MCV RBC AUTO: 95.7 FL (ref 80–100)
MONOCYTES # BLD: 11 % (ref 2–11)
NITRITE UR QL STRIP.AUTO: NEGATIVE
OTHER OBSERVATIONS UA: ABNORMAL
PDW BLD-RTO: 15.7 % (ref 12.5–15.4)
PLATELET # BLD AUTO: 271 K/UL (ref 140–450)
PMV BLD AUTO: 8.3 FL (ref 6–12)
POTASSIUM SERPL-SCNC: 3.9 MMOL/L (ref 3.7–5.3)
PROT SERPL-MCNC: 7.7 G/DL (ref 6.4–8.3)
PROT UR STRIP.AUTO-MCNC: 6.5 MG/DL (ref 5–8)
PROT UR STRIP.AUTO-MCNC: NEGATIVE MG/DL
RBC # BLD: 4.38 M/UL (ref 4–5.2)
RBC CLUMPS #/AREA URNS AUTO: ABNORMAL /HPF (ref 0–2)
SARS-COV-2 RDRP RESP QL NAA+PROBE: NOT DETECTED
SEG NEUTROPHILS: 47 % (ref 36–66)
SEGMENTED NEUTROPHILS ABSOLUTE COUNT: 2.1 K/UL (ref 1.8–7.7)
SODIUM SERPL-SCNC: 142 MMOL/L (ref 135–144)
SPECIFIC GRAVITY UA: 1.01 (ref 1–1.03)
SPECIMEN DESCRIPTION: NORMAL
TROPONIN I SERPL DL<=0.01 NG/ML-MCNC: 7 NG/L (ref 0–14)
TURBIDITY: CLEAR
URINE HGB: NEGATIVE
UROBILINOGEN, URINE: NORMAL
WBC # BLD AUTO: 4.5 K/UL (ref 3.5–11)
WBC UA: ABNORMAL /HPF (ref 0–5)

## 2023-03-29 PROCEDURE — 85025 COMPLETE CBC W/AUTO DIFF WBC: CPT

## 2023-03-29 PROCEDURE — 6360000002 HC RX W HCPCS: Performed by: EMERGENCY MEDICINE

## 2023-03-29 PROCEDURE — 71045 X-RAY EXAM CHEST 1 VIEW: CPT

## 2023-03-29 PROCEDURE — 70450 CT HEAD/BRAIN W/O DYE: CPT

## 2023-03-29 PROCEDURE — 6370000000 HC RX 637 (ALT 250 FOR IP): Performed by: EMERGENCY MEDICINE

## 2023-03-29 PROCEDURE — 93005 ELECTROCARDIOGRAM TRACING: CPT | Performed by: EMERGENCY MEDICINE

## 2023-03-29 PROCEDURE — 81001 URINALYSIS AUTO W/SCOPE: CPT

## 2023-03-29 PROCEDURE — 99285 EMERGENCY DEPT VISIT HI MDM: CPT

## 2023-03-29 PROCEDURE — G0378 HOSPITAL OBSERVATION PER HR: HCPCS

## 2023-03-29 PROCEDURE — 96374 THER/PROPH/DIAG INJ IV PUSH: CPT

## 2023-03-29 PROCEDURE — 87635 SARS-COV-2 COVID-19 AMP PRB: CPT

## 2023-03-29 PROCEDURE — 84484 ASSAY OF TROPONIN QUANT: CPT

## 2023-03-29 PROCEDURE — 36415 COLL VENOUS BLD VENIPUNCTURE: CPT

## 2023-03-29 PROCEDURE — 80053 COMPREHEN METABOLIC PANEL: CPT

## 2023-03-29 RX ORDER — HYDRALAZINE HYDROCHLORIDE 20 MG/ML
10 INJECTION INTRAMUSCULAR; INTRAVENOUS ONCE
Status: COMPLETED | OUTPATIENT
Start: 2023-03-29 | End: 2023-03-29

## 2023-03-29 RX ORDER — ENOXAPARIN SODIUM 100 MG/ML
40 INJECTION SUBCUTANEOUS DAILY
Status: DISCONTINUED | OUTPATIENT
Start: 2023-03-30 | End: 2023-03-30 | Stop reason: HOSPADM

## 2023-03-29 RX ORDER — SODIUM CHLORIDE 0.9 % (FLUSH) 0.9 %
5-40 SYRINGE (ML) INJECTION EVERY 12 HOURS SCHEDULED
Status: DISCONTINUED | OUTPATIENT
Start: 2023-03-30 | End: 2023-03-30 | Stop reason: HOSPADM

## 2023-03-29 RX ORDER — ONDANSETRON 2 MG/ML
4 INJECTION INTRAMUSCULAR; INTRAVENOUS EVERY 6 HOURS PRN
Status: DISCONTINUED | OUTPATIENT
Start: 2023-03-29 | End: 2023-03-30 | Stop reason: HOSPADM

## 2023-03-29 RX ORDER — POTASSIUM CHLORIDE 7.45 MG/ML
10 INJECTION INTRAVENOUS PRN
Status: DISCONTINUED | OUTPATIENT
Start: 2023-03-29 | End: 2023-03-30 | Stop reason: HOSPADM

## 2023-03-29 RX ORDER — NITROGLYCERIN 0.4 MG/1
0.4 TABLET SUBLINGUAL EVERY 5 MIN PRN
Status: DISCONTINUED | OUTPATIENT
Start: 2023-03-29 | End: 2023-03-30 | Stop reason: HOSPADM

## 2023-03-29 RX ORDER — ATORVASTATIN CALCIUM 20 MG/1
20 TABLET, FILM COATED ORAL DAILY
COMMUNITY

## 2023-03-29 RX ORDER — ACETAMINOPHEN 325 MG/1
650 TABLET ORAL EVERY 6 HOURS PRN
Status: DISCONTINUED | OUTPATIENT
Start: 2023-03-29 | End: 2023-03-30 | Stop reason: HOSPADM

## 2023-03-29 RX ORDER — CARVEDILOL 3.12 MG/1
6.25 TABLET ORAL 2 TIMES DAILY WITH MEALS
Status: DISCONTINUED | OUTPATIENT
Start: 2023-03-29 | End: 2023-03-30 | Stop reason: HOSPADM

## 2023-03-29 RX ORDER — SODIUM CHLORIDE 9 MG/ML
INJECTION, SOLUTION INTRAVENOUS PRN
Status: DISCONTINUED | OUTPATIENT
Start: 2023-03-29 | End: 2023-03-30 | Stop reason: HOSPADM

## 2023-03-29 RX ORDER — SODIUM CHLORIDE 0.9 % (FLUSH) 0.9 %
10 SYRINGE (ML) INJECTION PRN
Status: DISCONTINUED | OUTPATIENT
Start: 2023-03-29 | End: 2023-03-30 | Stop reason: HOSPADM

## 2023-03-29 RX ORDER — ASPIRIN 81 MG/1
81 TABLET, CHEWABLE ORAL DAILY
Status: DISCONTINUED | OUTPATIENT
Start: 2023-03-30 | End: 2023-03-30 | Stop reason: HOSPADM

## 2023-03-29 RX ORDER — POTASSIUM CHLORIDE 20 MEQ/1
40 TABLET, EXTENDED RELEASE ORAL PRN
Status: DISCONTINUED | OUTPATIENT
Start: 2023-03-29 | End: 2023-03-30 | Stop reason: HOSPADM

## 2023-03-29 RX ORDER — MAGNESIUM SULFATE 1 G/100ML
1000 INJECTION INTRAVENOUS PRN
Status: DISCONTINUED | OUTPATIENT
Start: 2023-03-29 | End: 2023-03-30 | Stop reason: HOSPADM

## 2023-03-29 RX ORDER — ALPRAZOLAM 0.5 MG/1
0.5 TABLET ORAL NIGHTLY PRN
Status: DISCONTINUED | OUTPATIENT
Start: 2023-03-30 | End: 2023-03-30

## 2023-03-29 RX ORDER — ATORVASTATIN CALCIUM 40 MG/1
40 TABLET, FILM COATED ORAL NIGHTLY
Status: DISCONTINUED | OUTPATIENT
Start: 2023-03-30 | End: 2023-03-30 | Stop reason: HOSPADM

## 2023-03-29 RX ORDER — CARVEDILOL 6.25 MG/1
6.25 TABLET ORAL 2 TIMES DAILY
Qty: 60 TABLET | Refills: 0 | Status: SHIPPED | OUTPATIENT
Start: 2023-03-29

## 2023-03-29 RX ORDER — CARVEDILOL 3.12 MG/1
3.12 TABLET ORAL 2 TIMES DAILY
COMMUNITY
Start: 2023-02-08 | End: 2023-03-29

## 2023-03-29 RX ORDER — ONDANSETRON 4 MG/1
4 TABLET, ORALLY DISINTEGRATING ORAL EVERY 8 HOURS PRN
Status: DISCONTINUED | OUTPATIENT
Start: 2023-03-29 | End: 2023-03-30 | Stop reason: HOSPADM

## 2023-03-29 RX ADMIN — CARVEDILOL 6.25 MG: 3.12 TABLET, FILM COATED ORAL at 20:24

## 2023-03-29 RX ADMIN — HYDRALAZINE HYDROCHLORIDE 10 MG: 20 INJECTION INTRAMUSCULAR; INTRAVENOUS at 23:15

## 2023-03-29 ASSESSMENT — ENCOUNTER SYMPTOMS
SHORTNESS OF BREATH: 0
NAUSEA: 0
DIARRHEA: 0
SORE THROAT: 0
BACK PAIN: 0
ABDOMINAL PAIN: 0
WHEEZING: 0
VOMITING: 0
COUGH: 0

## 2023-03-29 ASSESSMENT — PAIN SCALES - GENERAL: PAINLEVEL_OUTOF10: 0

## 2023-03-29 ASSESSMENT — PAIN - FUNCTIONAL ASSESSMENT: PAIN_FUNCTIONAL_ASSESSMENT: 0-10

## 2023-03-29 NOTE — ED PROVIDER NOTES
[address] [facility]  [address] [facility] [address]  Phone: [phone]    eMERGENCY dEPARTMENT eNCOUnter        Pt Name: Arthur Freeman  MRN: [id number]  [place] 1945  Date of evaluation: 3/29/23    CHIEF COMPLAINT     Chief Complaint   Patient presents with    Hypertension    Headache     Pt reports she has been having headaches intermittently for a few weeks. Today went to the dr and bp was approx 196/102. HISTORY OF PRESENT ILLNESS    Arthur Freeman is a 66 y.o. female who presents the emergency department from the PCPs office where she saw a new PA and Dr. Aurora Shone office. She stated she made appointment a couple days ago because she was not feeling well. She was feeling \"\" crummy. She felt off balance and not well. She had a headache. She stated the headache is now since resolved. Her blood pressure was quite high and the PA did not feel comfortable with her driving so she called her friend who insisted she be seen and evaluated here in the emergency department. She denies any headache now no visual disturbance or dizziness. No recent falls injury or trauma. No difficulty speaking thinking walking or moving. No weakness or numbness. She denies any fever chills cough congestion chest pain or shortness of breath. No abdominal pain nausea vomiting or diarrhea. Should also note that patient had an MRI of the cervical neck thoracic and lumbar spine as an outpatient today. This was in regards to    Past medical history hypercholesterolemia hypertension MS vertigo and IBS. She recently saw Dr. Casa Muro her cardiologist 4 weeks ago who switched her from amlodipine to Coreg because she was having eye issues. REVIEW OF SYSTEMS     Review of Systems   Constitutional:  Negative for chills and fever. HENT:  Negative for congestion, ear pain and sore throat. Respiratory:  Negative for cough, shortness of breath and wheezing.     Cardiovascular:

## 2023-03-30 VITALS
WEIGHT: 137 LBS | TEMPERATURE: 98.6 F | RESPIRATION RATE: 16 BRPM | HEIGHT: 59 IN | BODY MASS INDEX: 27.62 KG/M2 | OXYGEN SATURATION: 98 % | DIASTOLIC BLOOD PRESSURE: 77 MMHG | SYSTOLIC BLOOD PRESSURE: 128 MMHG | HEART RATE: 85 BPM

## 2023-03-30 LAB
CHOLEST SERPL-MCNC: 146 MG/DL
CHOLESTEROL/HDL RATIO: 2.4
EKG ATRIAL RATE: 68 BPM
EKG P AXIS: 45 DEGREES
EKG P-R INTERVAL: 148 MS
EKG Q-T INTERVAL: 414 MS
EKG QRS DURATION: 82 MS
EKG QTC CALCULATION (BAZETT): 440 MS
EKG R AXIS: 16 DEGREES
EKG T AXIS: 46 DEGREES
EKG VENTRICULAR RATE: 68 BPM
HCT VFR BLD AUTO: 41.6 % (ref 36–46)
HDLC SERPL-MCNC: 62 MG/DL
HGB BLD-MCNC: 13.5 G/DL (ref 12–16)
LDLC SERPL CALC-MCNC: 65 MG/DL (ref 0–130)
MAGNESIUM SERPL-MCNC: 2 MG/DL (ref 1.6–2.6)
MCH RBC QN AUTO: 31.6 PG (ref 26–34)
MCHC RBC AUTO-ENTMCNC: 32.5 G/DL (ref 31–37)
MCV RBC AUTO: 97.4 FL (ref 80–100)
PDW BLD-RTO: 14.4 % (ref 12.5–15.4)
PLATELET # BLD AUTO: 247 K/UL (ref 140–450)
PMV BLD AUTO: 10.3 FL (ref 8–14)
RBC # BLD: 4.27 M/UL (ref 4–5.2)
TRIGL SERPL-MCNC: 95 MG/DL
TROPONIN I SERPL DL<=0.01 NG/ML-MCNC: 8 NG/L (ref 0–14)
TROPONIN I SERPL DL<=0.01 NG/ML-MCNC: 8 NG/L (ref 0–14)
WBC # BLD AUTO: 6.1 K/UL (ref 3.5–11)

## 2023-03-30 PROCEDURE — 6360000002 HC RX W HCPCS: Performed by: NURSE PRACTITIONER

## 2023-03-30 PROCEDURE — G0378 HOSPITAL OBSERVATION PER HR: HCPCS

## 2023-03-30 PROCEDURE — 6370000000 HC RX 637 (ALT 250 FOR IP): Performed by: STUDENT IN AN ORGANIZED HEALTH CARE EDUCATION/TRAINING PROGRAM

## 2023-03-30 PROCEDURE — 85027 COMPLETE CBC AUTOMATED: CPT

## 2023-03-30 PROCEDURE — 36415 COLL VENOUS BLD VENIPUNCTURE: CPT

## 2023-03-30 PROCEDURE — 6370000000 HC RX 637 (ALT 250 FOR IP): Performed by: NURSE PRACTITIONER

## 2023-03-30 PROCEDURE — 83735 ASSAY OF MAGNESIUM: CPT

## 2023-03-30 PROCEDURE — 80061 LIPID PANEL: CPT

## 2023-03-30 PROCEDURE — 84484 ASSAY OF TROPONIN QUANT: CPT

## 2023-03-30 PROCEDURE — 99238 HOSP IP/OBS DSCHRG MGMT 30/<: CPT | Performed by: STUDENT IN AN ORGANIZED HEALTH CARE EDUCATION/TRAINING PROGRAM

## 2023-03-30 PROCEDURE — 2580000003 HC RX 258: Performed by: NURSE PRACTITIONER

## 2023-03-30 PROCEDURE — 96372 THER/PROPH/DIAG INJ SC/IM: CPT

## 2023-03-30 RX ORDER — LOSARTAN POTASSIUM 50 MG/1
50 TABLET ORAL DAILY
Qty: 30 TABLET | Refills: 3 | Status: SHIPPED | OUTPATIENT
Start: 2023-03-30

## 2023-03-30 RX ORDER — LOSARTAN POTASSIUM 50 MG/1
50 TABLET ORAL DAILY
Status: DISCONTINUED | OUTPATIENT
Start: 2023-03-30 | End: 2023-03-30 | Stop reason: HOSPADM

## 2023-03-30 RX ORDER — HYDRALAZINE HYDROCHLORIDE 25 MG/1
25 TABLET, FILM COATED ORAL EVERY 8 HOURS SCHEDULED
Qty: 90 TABLET | Refills: 3 | Status: SHIPPED | OUTPATIENT
Start: 2023-03-30

## 2023-03-30 RX ORDER — ALPRAZOLAM 0.5 MG/1
0.5 TABLET ORAL NIGHTLY PRN
Status: DISCONTINUED | OUTPATIENT
Start: 2023-03-30 | End: 2023-03-30 | Stop reason: HOSPADM

## 2023-03-30 RX ORDER — HYDRALAZINE HYDROCHLORIDE 25 MG/1
25 TABLET, FILM COATED ORAL EVERY 8 HOURS SCHEDULED
Status: DISCONTINUED | OUTPATIENT
Start: 2023-03-30 | End: 2023-03-30 | Stop reason: HOSPADM

## 2023-03-30 RX ADMIN — ACETAMINOPHEN 650 MG: 325 TABLET ORAL at 10:04

## 2023-03-30 RX ADMIN — ATORVASTATIN CALCIUM 40 MG: 40 TABLET, FILM COATED ORAL at 00:31

## 2023-03-30 RX ADMIN — SODIUM CHLORIDE, PRESERVATIVE FREE 10 ML: 5 INJECTION INTRAVENOUS at 00:31

## 2023-03-30 RX ADMIN — CARVEDILOL 6.25 MG: 3.12 TABLET, FILM COATED ORAL at 10:03

## 2023-03-30 RX ADMIN — HYDRALAZINE HYDROCHLORIDE 25 MG: 25 TABLET, FILM COATED ORAL at 05:46

## 2023-03-30 RX ADMIN — ASPIRIN 81 MG CHEWABLE TABLET 81 MG: 81 TABLET CHEWABLE at 10:03

## 2023-03-30 RX ADMIN — SODIUM CHLORIDE, PRESERVATIVE FREE 10 ML: 5 INJECTION INTRAVENOUS at 10:08

## 2023-03-30 RX ADMIN — ACETAMINOPHEN 650 MG: 325 TABLET ORAL at 02:36

## 2023-03-30 RX ADMIN — ENOXAPARIN SODIUM 40 MG: 100 INJECTION SUBCUTANEOUS at 10:04

## 2023-03-30 RX ADMIN — LOSARTAN POTASSIUM 50 MG: 50 TABLET, FILM COATED ORAL at 10:03

## 2023-03-30 ASSESSMENT — PAIN DESCRIPTION - LOCATION
LOCATION: HEAD

## 2023-03-30 ASSESSMENT — PAIN SCALES - GENERAL
PAINLEVEL_OUTOF10: 2
PAINLEVEL_OUTOF10: 4
PAINLEVEL_OUTOF10: 3
PAINLEVEL_OUTOF10: 3

## 2023-03-30 ASSESSMENT — PAIN DESCRIPTION - DESCRIPTORS: DESCRIPTORS: POUNDING

## 2023-03-30 NOTE — DISCHARGE SUMMARY
[facility]  Office: [address], DO, Benitez Solis, DO, Davonjarvis Summers, DO, Dandre Crowell, DO, Phil Soriano MD, Tj Vega MD, Becka Prakash MD, Abhinav Solano MD,  Eder Sher MD, Real Oneal MD, Nupur Mccarty, DO, Alexandra Beltre MD,  Jeff Gaspar MD, Myrtle Wall MD, Selina Sarabia DO, Deejay Martinez MD, Penny Neely MD, Alexandra Vega DO, Ferny Still MD, Bassam Kwok MD, Alma Talley MD, Jeremiah Mascorro MD,  Shahbaz Ayoub DO, Jose Martin Manzanares MD,  Ludmila Davalos, CNP,  Jackelyn Cuadra, CNP, Aman Engle, CNP, Guicho Puga, CNP,  Rose Mary Dumont, DNP, Obed Casiano, CNP, Ángela Osuna, CNP, Darrius Massey, CNP, Pricila Ziegler, CNP, Kathy Tracy, CNP, Lorraine Crowder PA-C, Shawn Lopez, CNS, Haven Millard, CNP, Halima Sotomayor, CNP         [facility]    Discharge Summary     Patient ID: Jessica Jacobo  :  1945   MRN: [id number]     ACCOUNT:  [de-identified]   Patient's PCP: Hailey Asif MD  Admit Date: 3/29/2023   Discharge Date: 3/30/2023  Length of Stay: 0  Code Status:  Full Code  Admitting Physician: No admitting provider for patient encounter. Discharge Physician: Myrtle Wall MD     Active Discharge Diagnoses:     Hospital Problem Lists:  Principal Problem:    Hypertensive urgency  Active Problems:    MS (multiple sclerosis) ([facility] [address]    Essential hypertension    Overweight (BMI 25.0-29. 9)  Resolved Problems:    * No resolved hospital problems. *      Admission Condition:  good     Discharged Condition: good    Hospital Stay:     Hospital Course:  Jessica Jacobo is a 66 y.o. female with a past medical history of multiple sclerosis and hypertension who presented to the emergency department on 3/29/2023 complaining of hypertension and headaches.  The patient states that she has had persistent headaches for the past several weeks and reports that her blood pressure has been difficult to control. The patient sees multiple neurologists for management of her multiple sclerosis and headaches and follows with [address] cardiology for hypertension. In the ED, the patient was afebrile and nontoxic appearing. She was hypertensive with an initial blood pressure of 208/105. CT scan of the head was negative for an acute intracranial abnormality. The patient's blood pressure improved with carvedilol and hydralazine in the ED. Her cardiologist was contacted and recommended admission for overnight blood pressure monitoring. The patient's blood pressure stabilized overnight and was 138/80 prior to discharge. The patient is discharged home today (3/30) in stable condition. She is instructed to follow-up with neurology and cardiology as scheduled.      Significant therapeutic interventions: Blood pressure medication adjustment     Significant Diagnostic Studies:   Labs:  Hematology:  Recent Labs     03/29/23 1748 03/30/23  0242   WBC 4.5 6.1   RBC 4.38 4.27   HGB 13.9 13.5   HCT 41.9 41.6   MCV 95.7 97.4   MCH 31.7 31.6   MCHC 33.1 32.5   RDW 15.7* 14.4    247   MPV 8.3 10.3     Chemistry:  Recent Labs     03/29/23 1748 03/30/23  0044 03/30/23  0242     --   --    K 3.9  --   --      --   --    CO2 24  --   --    GLUCOSE 101*  --   --    BUN 19  --   --    CREATININE 0.87  --   --    MG  --   --  2.0   ANIONGAP 12  --   --    LABGLOM >60  --   --    CALCIUM 10.0  --   --    TROPHS 7 8 8     Recent Labs     03/29/23 1748   PROT 7.7   LABALBU 4.3   AST 17   ALT 13   ALKPHOS 71   BILITOT 0.4     ABG:No results found for: POCPH, PHART, PH, POCPCO2, AJE9ARY, PCO2, POCPO2, PO2ART, PO2, POCHCO3, ISZ5UPN, HCO3, NBEA, PBEA, BEART, BE, THGBART, THB, QTZ4CKX, FMTF4IUO, W5EBMBYZ, O2SAT, FIO2  Lab Results   Component Value Date/Time    SPECIAL NOT REPORTED 10/15/2021 01:13 AM     Lab Results   Component Value Date/Time    CULTURE NO SIGNIFICANT GROWTH 10/15/2021 01:13 AM

## 2023-03-30 NOTE — PLAN OF CARE
Problem: Discharge Planning  Goal: Discharge to home or other facility with appropriate resources  3/30/2023 1100 by Jessica Alford RN  Outcome: Progressing  Flowsheets (Taken 3/30/2023 0180)  Discharge to home or other facility with appropriate resources:   Identify barriers to discharge with patient and caregiver   Arrange for needed discharge resources and transportation as appropriate   Identify discharge learning needs (meds, wound care, etc)     Problem: Pain  Goal: Verbalizes/displays adequate comfort level or baseline comfort level  3/30/2023 1100 by Jessica Alford RN  Outcome: Progressing  Flowsheets (Taken 3/30/2023 1411)  Verbalizes/displays adequate comfort level or baseline comfort level:   Encourage patient to monitor pain and request assistance   Assess pain using appropriate pain scale   Administer analgesics based on type and severity of pain and evaluate response   Implement non-pharmacological measures as appropriate and evaluate response

## 2023-03-30 NOTE — DISCHARGE INSTRUCTIONS
PLEASE RETURN TO THE EMERGENCY DEPARTMENT IMMEDIATELY if your symptoms worsen in anyway or in 8-12 hours if not improved for re-evaluation. You should immediately return to the ER for symptoms such as increasing pain, shortness of breath, fever, a feeling of passing out, light headed, dizziness, abdominal pain, numbness or weakness to the arms or legs, coolness or color change of the arms or legs. Take your medication as indicated and prescribed. If you are given an antibiotic then, make sure you get the prescription filled and take the antibiotics until finished. Please understand that at this time there is no evidence for a more serious underlying process, but that early in the process of an illness or injury, an emergency department workup can be falsely reassuring. You should contact your family doctor within the next 24 hours for a follow up appointment    Viridiana Webb!!!    From [facility] and [facility]    On behalf of the Emergency Department staff at [facility]), I would like to thank you for giving us the opportunity to address your health care needs and concerns. We hope that during your visit, our service was delivered in a professional and caring manner. Please keep [facility] in mind as we walk with you down the path to your own personal wellness. Please expect an automated text message or email from us so we can ask a few questions about your health and progress. Based on your answers, a clinician may call you back to offer help and instructions. Please understand that early in the process of an illness or injury, an emergency department workup can be falsely reassuring. If you notice any worsening, changing or persistent symptoms please call your family doctor or return to the ER immediately. Tell us how we did during your visit at [email or web address]   and let us know about your experience

## 2023-03-30 NOTE — PLAN OF CARE
Problem: Pain  Goal: Verbalizes/displays adequate comfort level or baseline comfort level  Outcome: Progressing   No new signs/symptoms of pain noted, pain rating < 3 on scale of 0-10, pain controlled with medication/ repositioning   Problem: ABCDS Injury Assessment  Goal: Absence of physical injury  Outcome: Progressing   No falls/ injuries this shift, bed in lowest position, brakes on, bed alarm on, call light in reach, side rails up x2   Problem: Cardiovascular - Adult  Goal: Maintains optimal cardiac output and hemodynamic stability  Outcome: Progressing   Patient on telemetry monitor, VS assessed per unit routine

## 2023-03-30 NOTE — ED PROVIDER NOTES
allergic to ciprofloxacin, lisinopril, and codeine. FAMILY HISTORY     is adopted. family history is not on file. She was adopted. SOCIAL HISTORY      reports that she has never smoked. She has never used smokeless tobacco. She reports current alcohol use of about 1.0 standard drink per week. She reports that she does not use drugs. DIAGNOSTIC RESULTS     EKG: All EKG's are interpreted by the Emergency Department Physician who either signs or Co-signs this chart in the absence of a cardiologist.        RADIOLOGY:   Non-plain film images such as CT, Ultrasound and MRI are read by the radiologist. Plain radiographic images are visualized and the radiologist interpretations are reviewed as follows:   CT HEAD WO CONTRAST   Final Result   1. No acute intracranial findings. 2. Prominence of ventricles in relation to the size of the sulci which   appears unchanged and raise the question of communicating hydrocephalus. 3. Prominent multifocal areas of decreased attenuation in the white matter   which appear unchanged and could represent chronic microvascular ischemic   changes, transependymal flow of CSF or a combination of those. XR CHEST PORTABLE   Final Result   No radiographic evidence of an acute cardiopulmonary process. CT HEAD WO CONTRAST    Result Date: 3/29/2023  EXAMINATION: CT OF THE HEAD WITHOUT CONTRAST  3/29/2023 5:03 pm TECHNIQUE: CT of the head was performed without the administration of intravenous contrast. Automated exposure control, iterative reconstruction, and/or weight based adjustment of the mA/kV was utilized to reduce the radiation dose to as low as reasonably achievable. COMPARISON: 04/14/2020.  HISTORY: ORDERING SYSTEM PROVIDED HISTORY: Headache TECHNOLOGIST PROVIDED HISTORY: Headache Decision Support Exception - unselect if not a suspected or confirmed emergency medical condition->Emergency Medical Condition (MA) Reason for Exam: Headache FINDINGS: uropathogen can be unreliable in many patient populations. Rapid screening tests are less sensitive than culture and if UTI is a clinical possibility, culture should be considered despite a negative urinalysis. EKG 12 Lead   Result Value Ref Range    Ventricular Rate 68 BPM    Atrial Rate 68 BPM    P-R Interval 148 ms    QRS Duration 82 ms    Q-T Interval 414 ms    QTc Calculation (Bazett) 440 ms    P Axis 45 degrees    R Axis 16 degrees    T Axis 46 degrees         EMERGENCY DEPARTMENT COURSE:   Recent Vitals:    Vitals:    03/29/23 2045 03/29/23 2058 03/29/23 2115 03/29/23 2156   BP: (!) 189/96 (!) 190/93 (!) 187/99 (!) 212/78   Pulse: 68 67  62   Resp: 18 18  16   Temp:       TempSrc:       SpO2: 99% 98% 98% 99%   Weight:         -------------------------  BP: (!) 212/78, Temp: 97.6 °F (36.4 °C), Heart Rate: 62, Resp: 16      The patient was given the following medications:  Orders Placed This Encounter   Medications    carvedilol (COREG) 6.25 MG tablet     Sig: Take 1 tablet by mouth 2 times daily     Dispense:  60 tablet     Refill:  0    carvedilol (COREG) tablet 6.25 mg    hydrALAZINE (APRESOLINE) injection 10 mg           MEDICAL DECISION MAKING:     Patient presents with headache and hypertension. Headache has resolved. Patient recently switched from Norvasc to Coreg about a month ago. She developed a headache and saw her PCP who found her to be hypertensive. Patient reports her baseline blood pressure is typically 238X systolic. She does have a history of MS and her neurologist ordered MRIs of her cervical through lumbar spine done today as an outpatient. CT scan of the head here shows no acute findings however chronic findings are unchanged. On my exam clinically the patient is awake, alert and appropriate. She reports no headache at this time and states she feels well. Her blood pressure has improved somewhat.   I spoke with her cardiologist at 8:35 PM who was able to look the patient up in

## 2023-03-30 NOTE — CARE COORDINATION
Case Management Assessment  Initial Evaluation    Date/Time of Evaluation: 3/30/2023 2:53 PM  Assessment Completed by: Mavis Miranda RN    If patient is discharged prior to next notation, then this note serves as note for discharge by case management. Patient Name: Angelique Stoddard                   YOB: 1945  Diagnosis: Primary hypertension [I10]  Hypertensive urgency [I16.0]  Nonintractable episodic headache, unspecified headache type [R51.9]                   Date / Time: 3/29/2023  5:17 PM    Patient Admission Status: Observation   Readmission Risk (Low < 19, Mod (19-27), High > 27): No data recorded  Current PCP: Mateusz Meraz MD  PCP verified by CM? Yes    Chart Reviewed: Yes      History Provided by: Patient  Patient Orientation: Alert and Oriented    Patient Cognition: Alert    Hospitalization in the last 30 days (Readmission):  Yes    If yes, Readmission Assessment in  Navigator will be completed. Advance Directives:      Code Status: Prior   Patient's Primary Decision Maker is: Legal Next of Kin      Discharge Planning:    Patient lives with: Alone Type of Home: House (condo)  Primary Care Giver: Self  Patient Support Systems include: Children, Spouse/Significant Other   Current Financial resources:    Current community resources:    Current services prior to admission: Durable Medical Equipment            Current DME: [employer]            Type of Home Care services:  None    ADLS  Prior functional level: Independent in ADLs/IADLs  Current functional level: Independent in ADLs/IADLs    PT AM-PAC:   /24  OT AM-PAC:   /24    Family can provide assistance at DC: Yes  Would you like Case Management to discuss the discharge plan with any other family members/significant others, and if so, who?     Plans to Return to Present Housing: Yes  Other Identified Issues/Barriers to RETURNING to current housing: none   Potential Assistance needed at discharge: N/A            Potential DME:

## 2023-03-30 NOTE — H&P
intact  Skin: No gross lesions, rashes, bruising or bleeding on exposed skin area  Extremities:  peripheral pulses palpable, no pedal edema or calf pain with palpation  Psych: normal affect     Investigations:      Laboratory Testing:  Recent Results (from the past 24 hour(s))   CBC with Auto Differential    Collection Time: 03/29/23  5:48 PM   Result Value Ref Range    WBC 4.5 3.5 - 11.0 k/uL    RBC 4.38 4.0 - 5.2 m/uL    Hemoglobin 13.9 12.0 - 16.0 g/dL    Hematocrit 41.9 36 - 46 %    MCV 95.7 80 - 100 fL    MCH 31.7 26 - 34 pg    MCHC 33.1 31 - 37 g/dL    RDW 15.7 (H) 12.5 - 15.4 %    Platelets 477 543 - 623 k/uL    MPV 8.3 6.0 - 12.0 fL    Seg Neutrophils 47 36 - 66 %    Lymphocytes 36 24 - 44 %    Monocytes 11 2 - 11 %    Eosinophils % 5 (H) 1 - 4 %    Basophils 1 0 - 2 %    Segs Absolute 2.10 1.8 - 7.7 k/uL    Absolute Lymph # 1.60 1.0 - 4.8 k/uL    Absolute Mono # 0.50 0.1 - 1.2 k/uL    Absolute Eos # 0.20 0.0 - 0.4 k/uL    Basophils Absolute 0.00 0.0 - 0.2 k/uL   COVID-19, Rapid    Collection Time: 03/29/23  5:48 PM    Specimen: Nasopharyngeal Swab   Result Value Ref Range    Specimen Description . NASOPHARYNGEAL SWAB     SARS-CoV-2, Rapid Not Detected Not Detected   CMP    Collection Time: 03/29/23  5:48 PM   Result Value Ref Range    Glucose 101 (H) 70 - 99 mg/dL    BUN 19 8 - 23 mg/dL    Creatinine 0.87 0.50 - 0.90 mg/dL    Est, Glom Filt Rate >60 >60 mL/min/1.73m2    Calcium 10.0 8.6 - 10.4 mg/dL    Sodium 142 135 - 144 mmol/L    Potassium 3.9 3.7 - 5.3 mmol/L    Chloride 106 98 - 107 mmol/L    CO2 24 20 - 31 mmol/L    Anion Gap 12 9 - 17 mmol/L    Alkaline Phosphatase 71 35 - 104 U/L    ALT 13 5 - 33 U/L    AST 17 <32 U/L    Total Bilirubin 0.4 0.3 - 1.2 mg/dL    Total Protein 7.7 6.4 - 8.3 g/dL    Albumin 4.3 3.5 - 5.2 g/dL    Albumin/Globulin Ratio 1.3 1.0 - 2.5   Troponin    Collection Time: 03/29/23  5:48 PM   Result Value Ref Range    Troponin, High Sensitivity 7 0 - 14 ng/L   EKG 12 Lead

## 2023-04-21 ENCOUNTER — HOSPITAL ENCOUNTER (OUTPATIENT)
Dept: CT IMAGING | Age: 78
End: 2023-04-21
Payer: MEDICARE

## 2023-04-21 DIAGNOSIS — R31.0 GROSS HEMATURIA: ICD-10-CM

## 2023-04-21 DIAGNOSIS — Z87.442 HISTORY OF KIDNEY STONES: ICD-10-CM

## 2023-04-21 PROCEDURE — 6360000004 HC RX CONTRAST MEDICATION: Performed by: NURSE PRACTITIONER

## 2023-04-21 PROCEDURE — 74178 CT ABD&PLV WO CNTR FLWD CNTR: CPT | Performed by: NURSE PRACTITIONER

## 2023-04-21 PROCEDURE — 2580000003 HC RX 258: Performed by: NURSE PRACTITIONER

## 2023-04-21 RX ORDER — SODIUM CHLORIDE 0.9 % (FLUSH) 0.9 %
10 SYRINGE (ML) INJECTION PRN
Status: DISCONTINUED | OUTPATIENT
Start: 2023-04-21 | End: 2023-04-24 | Stop reason: HOSPADM

## 2023-04-21 RX ORDER — 0.9 % SODIUM CHLORIDE 0.9 %
80 INTRAVENOUS SOLUTION INTRAVENOUS ONCE
Status: COMPLETED | OUTPATIENT
Start: 2023-04-21 | End: 2023-04-21

## 2023-04-21 RX ADMIN — IOPAMIDOL 120 ML: 755 INJECTION, SOLUTION INTRAVENOUS at 12:59

## 2023-04-21 RX ADMIN — SODIUM CHLORIDE, PRESERVATIVE FREE 10 ML: 5 INJECTION INTRAVENOUS at 12:59

## 2023-04-21 RX ADMIN — SODIUM CHLORIDE 80 ML: 9 INJECTION, SOLUTION INTRAVENOUS at 12:59

## 2023-05-11 ENCOUNTER — TELEPHONE (OUTPATIENT)
Dept: UROLOGY | Age: 78
End: 2023-05-11

## 2023-05-11 DIAGNOSIS — R30.0 DYSURIA: Primary | ICD-10-CM

## 2023-05-11 DIAGNOSIS — R10.9 FLANK PAIN: ICD-10-CM

## 2023-05-11 DIAGNOSIS — Z96.0 URETERAL STENT PRESENT: ICD-10-CM

## 2023-05-15 ENCOUNTER — HOSPITAL ENCOUNTER (OUTPATIENT)
Age: 78
Discharge: HOME | End: 2023-05-15
Payer: MEDICARE

## 2023-05-15 ENCOUNTER — TELEPHONE (OUTPATIENT)
Dept: UROLOGY | Age: 78
End: 2023-05-15

## 2023-05-15 DIAGNOSIS — N39.0 URINARY TRACT INFECTION WITHOUT HEMATURIA, SITE UNSPECIFIED: ICD-10-CM

## 2023-05-15 DIAGNOSIS — N39.0 URINARY TRACT INFECTION WITHOUT HEMATURIA, SITE UNSPECIFIED: Primary | ICD-10-CM

## 2023-05-15 LAB — CALCIUM SERPL-MCNC: 10.5 MG/DL (ref 8.6–10.4)

## 2023-05-15 PROCEDURE — 36415 COLL VENOUS BLD VENIPUNCTURE: CPT

## 2023-05-15 PROCEDURE — 82310 ASSAY OF CALCIUM: CPT

## 2023-05-15 PROCEDURE — 87086 URINE CULTURE/COLONY COUNT: CPT

## 2023-05-15 RX ORDER — HYDROCODONE BITARTRATE AND ACETAMINOPHEN 5; 325 MG/1; MG/1
1 TABLET ORAL EVERY 8 HOURS PRN
Qty: 10 TABLET | Refills: 0 | Status: SHIPPED | OUTPATIENT
Start: 2023-05-15 | End: 2023-05-18

## 2023-05-15 RX ORDER — DOXYCYCLINE HYCLATE 100 MG
100 TABLET ORAL 2 TIMES DAILY
Qty: 10 TABLET | Refills: 0 | Status: ON HOLD | OUTPATIENT
Start: 2023-05-15 | End: 2023-05-19

## 2023-05-15 NOTE — TELEPHONE ENCOUNTER
Cysto, (RT) URS, HLL, (RT) stent exchange @ [facility] 5/19/23 8:15am   PAT phone call 5/17/23 11:00am         Spoke with patient, procedure info emailed. Spoke with patient, patient to  antibiotic today and begin taking.  Patient understands all instruction

## 2023-05-15 NOTE — TELEPHONE ENCOUNTER
Surgery scheduling needs to speak with patient. Patient's cysto will need to be cancelled for the office, and procedure put on.

## 2023-05-15 NOTE — TELEPHONE ENCOUNTER
Patient had surgery with Dr. Nelly Pichardo at [facility], needs second look URS. Was scheduled with Dr. Nelly Pichardo, but she is a patient of Dr. Carol Jeronimo. Shira Palmer, surgery scheduler spoke with patient. Will cancel cysto tomorrow. Will cancel surgery with Nelly Pichardo, reschedule with Dr. Carol Jeronimo this Friday. She will do urine culture and blood work today. Start doxycycline based off last urine culture, as she is reporting dysuria and concerns for infection. She has stent currently. She reports pain on the telephone, which is not well controlled with otc analgesics. OARRS reviewed and shows no evidence of aberrant behavior. Will send doxycycline. Will send norco.  Urine culture order placed, surgery will take care of the pre-op labs.

## 2023-05-16 LAB
MICROORGANISM SPEC CULT: NO GROWTH
SPECIMEN DESCRIPTION: NORMAL

## 2023-05-17 ENCOUNTER — TELEPHONE (OUTPATIENT)
Dept: UROLOGY | Age: 78
End: 2023-05-17

## 2023-05-17 ENCOUNTER — HOSPITAL ENCOUNTER (OUTPATIENT)
Dept: PREADMISSION TESTING | Age: 78
End: 2023-05-17
Payer: MEDICARE

## 2023-05-17 VITALS — BODY MASS INDEX: 27.62 KG/M2 | WEIGHT: 137 LBS | HEIGHT: 59 IN

## 2023-05-17 NOTE — PROGRESS NOTES
Pre-op Instructions For Out-Patient Surgery    Medication Instructions:  Please stop herbs and any supplements now (includes vitamins and minerals). Please contact your surgeon and prescribing physician for pre-op instructions for any blood thinners. Ibuprofen     If you have inhalers/aerosol treatments at home, please use them the morning of your surgery and bring the inhalers with you to the hospital.    Please take the following medications the morning of your surgery with a sip of water:    Losartan and Carvedilol  Sharon Bennett does not believe she takes Hydralazine)     Surgery Instructions:  After midnight before surgery:  Do not eat or drink anything, including water, mints, gum, and hard candy. You may brush your teeth without swallowing. No smoking, chewing tobacco, or street drugs. Please shower or bathe before surgery. Please do not wear any cologne, lotion, powder, deodorant, jewelry, piercings, perfume, makeup, nail polish, hair accessories, or hair spray on the day of surgery. Wear loose comfortable clothing. Leave your valuables at home. Bring a storage case for any glasses/contacts. An adult who is responsible for you MUST drive you home and should be with you for the first 24 hours after surgery. If having out-patient knee and foot surgeries, please arrange for planned crutches, walker, or wheelchair before arriving to the hospital.    The Day of Surgery:  Arrive at [facility] Surgery Entrance at the time directed by your surgeon and check in at the desk. If you have a living will or healthcare power of , please bring a copy. You will be taken to the pre-op holding area where you will be prepared for surgery. A physical assessment will be performed by a nurse practitioner or house officer.   Your IV will be started and you will meet your anesthesiologist.    When you go to surgery, your family will be directed to the

## 2023-05-17 NOTE — TELEPHONE ENCOUNTER
Per Peyton Stanton CNP, have patient stop Doxycycline. Writer contacted patient with update. Writer answered patients questions to the best of ability. Patient will call the office back with any other questions or concerns.

## 2023-05-18 ENCOUNTER — ANESTHESIA EVENT (OUTPATIENT)
Dept: OPERATING ROOM | Age: 78
End: 2023-05-18
Payer: MEDICARE

## 2023-05-18 NOTE — PRE-PROCEDURE INSTRUCTIONS
No answer, left message ? yes                    Unable to leave message ? When were you told to arrive at hospital ?  0630    Do you have a  ? Are you on any blood thinners ? If yes when did you stop taking ? Do you have your prep Rx filled and instruction ? Nothing to eat the day before , only clear liquids. Are you experiencing any covid symptoms ? Do you have any infections or rash we should be aware of ? Do you have the Hibiclens soap to use the night before and the morning of surgery ? Nothing to eat or drink after midnight, only a sip of water to take any medication instructed to take the night before. Wear comfortable clothing, leave any valuables at home, remove any jewelry and body piercing .

## 2023-05-19 ENCOUNTER — HOSPITAL ENCOUNTER (OUTPATIENT)
Age: 78
Setting detail: OUTPATIENT SURGERY
Discharge: HOME | End: 2023-05-19
Attending: UROLOGY | Admitting: UROLOGY
Payer: MEDICARE

## 2023-05-19 ENCOUNTER — APPOINTMENT (OUTPATIENT)
Dept: GENERAL RADIOLOGY | Age: 78
End: 2023-05-19
Attending: UROLOGY
Payer: MEDICARE

## 2023-05-19 ENCOUNTER — ANESTHESIA (OUTPATIENT)
Dept: OPERATING ROOM | Age: 78
End: 2023-05-19
Payer: MEDICARE

## 2023-05-19 VITALS
RESPIRATION RATE: 16 BRPM | HEART RATE: 90 BPM | WEIGHT: 137 LBS | OXYGEN SATURATION: 96 % | DIASTOLIC BLOOD PRESSURE: 72 MMHG | HEIGHT: 59 IN | SYSTOLIC BLOOD PRESSURE: 142 MMHG | TEMPERATURE: 97.5 F | BODY MASS INDEX: 27.62 KG/M2

## 2023-05-19 DIAGNOSIS — N20.0 KIDNEY STONE: Primary | ICD-10-CM

## 2023-05-19 PROBLEM — G47.01 INSOMNIA DUE TO MEDICAL CONDITION: Status: ACTIVE | Noted: 2021-07-28

## 2023-05-19 PROBLEM — R42 VERTIGO: Status: ACTIVE | Noted: 2021-06-30

## 2023-05-19 PROBLEM — N31.9 NEUROGENIC BLADDER: Status: ACTIVE | Noted: 2021-07-28

## 2023-05-19 PROBLEM — H93.13 BILATERAL TINNITUS: Status: ACTIVE | Noted: 2021-06-30

## 2023-05-19 PROBLEM — R41.89 SUBJECTIVE MEMORY COMPLAINTS: Status: ACTIVE | Noted: 2022-05-16

## 2023-05-19 PROBLEM — G91.2 NORMAL PRESSURE HYDROCEPHALUS (HCC): Status: ACTIVE | Noted: 2022-05-16

## 2023-05-19 PROBLEM — H90.3 BILATERAL HIGH FREQUENCY SENSORINEURAL HEARING LOSS: Status: ACTIVE | Noted: 2022-03-08

## 2023-05-19 PROBLEM — H93.8X3 EAR POPPING, BILATERAL: Status: ACTIVE | Noted: 2022-02-14

## 2023-05-19 PROBLEM — G43.819 MIGRAINE VARIANT, INTRACTABLE: Status: ACTIVE | Noted: 2022-05-16

## 2023-05-19 PROBLEM — R26.9 GAIT DISTURBANCE: Status: ACTIVE | Noted: 2022-05-16

## 2023-05-19 PROBLEM — N13.2 HYDRONEPHROSIS WITH RENAL AND URETERAL CALCULOUS OBSTRUCTION: Status: ACTIVE | Noted: 2023-05-08

## 2023-05-19 PROBLEM — M25.559 PAIN IN JOINT INVOLVING PELVIC REGION AND THIGH: Status: ACTIVE | Noted: 2021-03-11

## 2023-05-19 PROBLEM — R15.9 BOWEL AND BLADDER INCONTINENCE: Status: ACTIVE | Noted: 2022-05-16

## 2023-05-19 PROBLEM — N17.9 ACUTE KIDNEY INJURY (HCC): Status: ACTIVE | Noted: 2023-05-08

## 2023-05-19 PROBLEM — M25.551 RIGHT HIP PAIN: Status: ACTIVE | Noted: 2021-03-11

## 2023-05-19 PROBLEM — H55.82 DEFICIENT SMOOTH PURSUIT EYE MOVEMENTS: Status: ACTIVE | Noted: 2022-06-22

## 2023-05-19 PROBLEM — R26.89 BALANCE DISORDER: Status: ACTIVE | Noted: 2021-06-30

## 2023-05-19 PROBLEM — G35 HISTORY OF MULTIPLE SCLEROSIS (HCC): Status: ACTIVE | Noted: 2019-04-23

## 2023-05-19 PROBLEM — M19.049 LOCALIZED, PRIMARY OSTEOARTHRITIS OF HAND: Status: ACTIVE | Noted: 2021-03-11

## 2023-05-19 PROBLEM — R32 BOWEL AND BLADDER INCONTINENCE: Status: ACTIVE | Noted: 2022-05-16

## 2023-05-19 PROCEDURE — 7100000031 HC ASPR PHASE II RECOVERY - ADDTL 15 MIN: Performed by: UROLOGY

## 2023-05-19 PROCEDURE — 2709999900 HC NON-CHARGEABLE SUPPLY: Performed by: UROLOGY

## 2023-05-19 PROCEDURE — 6360000002 HC RX W HCPCS: Performed by: ANESTHESIOLOGY

## 2023-05-19 PROCEDURE — 7100000030 HC ASPR PHASE II RECOVERY - FIRST 15 MIN: Performed by: UROLOGY

## 2023-05-19 PROCEDURE — 2720000010 HC SURG SUPPLY STERILE: Performed by: UROLOGY

## 2023-05-19 PROCEDURE — 2500000003 HC RX 250 WO HCPCS: Performed by: ANESTHESIOLOGY

## 2023-05-19 PROCEDURE — 3700000000 HC ANESTHESIA ATTENDED CARE: Performed by: UROLOGY

## 2023-05-19 PROCEDURE — 2500000003 HC RX 250 WO HCPCS: Performed by: NURSE ANESTHETIST, CERTIFIED REGISTERED

## 2023-05-19 PROCEDURE — 6370000000 HC RX 637 (ALT 250 FOR IP): Performed by: ANESTHESIOLOGY

## 2023-05-19 PROCEDURE — 2580000003 HC RX 258: Performed by: ANESTHESIOLOGY

## 2023-05-19 PROCEDURE — C1758 CATHETER, URETERAL: HCPCS | Performed by: UROLOGY

## 2023-05-19 PROCEDURE — 7100000010 HC PHASE II RECOVERY - FIRST 15 MIN: Performed by: UROLOGY

## 2023-05-19 PROCEDURE — C2617 STENT, NON-COR, TEM W/O DEL: HCPCS | Performed by: UROLOGY

## 2023-05-19 PROCEDURE — 7100000011 HC PHASE II RECOVERY - ADDTL 15 MIN: Performed by: UROLOGY

## 2023-05-19 PROCEDURE — 7100000001 HC PACU RECOVERY - ADDTL 15 MIN: Performed by: UROLOGY

## 2023-05-19 PROCEDURE — A4216 STERILE WATER/SALINE, 10 ML: HCPCS | Performed by: ANESTHESIOLOGY

## 2023-05-19 PROCEDURE — 3600000003 HC SURGERY LEVEL 3 BASE: Performed by: UROLOGY

## 2023-05-19 PROCEDURE — 6360000002 HC RX W HCPCS: Performed by: NURSE ANESTHETIST, CERTIFIED REGISTERED

## 2023-05-19 PROCEDURE — 3209999900 FLUORO FOR SURGICAL PROCEDURES

## 2023-05-19 PROCEDURE — 3600000013 HC SURGERY LEVEL 3 ADDTL 15MIN: Performed by: UROLOGY

## 2023-05-19 PROCEDURE — C1769 GUIDE WIRE: HCPCS | Performed by: UROLOGY

## 2023-05-19 PROCEDURE — 7100000000 HC PACU RECOVERY - FIRST 15 MIN: Performed by: UROLOGY

## 2023-05-19 PROCEDURE — 3700000001 HC ADD 15 MINUTES (ANESTHESIA): Performed by: UROLOGY

## 2023-05-19 DEVICE — URETERAL STENT
Type: IMPLANTABLE DEVICE | Site: URETER | Status: FUNCTIONAL
Brand: POLARIS™ ULTRA

## 2023-05-19 RX ORDER — SODIUM CHLORIDE 0.9 % (FLUSH) 0.9 %
5-40 SYRINGE (ML) INJECTION PRN
Status: DISCONTINUED | OUTPATIENT
Start: 2023-05-19 | End: 2023-05-19 | Stop reason: HOSPADM

## 2023-05-19 RX ORDER — EPHEDRINE SULFATE/0.9% NACL/PF 50 MG/5 ML
SYRINGE (ML) INTRAVENOUS PRN
Status: DISCONTINUED | OUTPATIENT
Start: 2023-05-19 | End: 2023-05-19 | Stop reason: SDUPTHER

## 2023-05-19 RX ORDER — PROPOFOL 10 MG/ML
INJECTION, EMULSION INTRAVENOUS PRN
Status: DISCONTINUED | OUTPATIENT
Start: 2023-05-19 | End: 2023-05-19 | Stop reason: SDUPTHER

## 2023-05-19 RX ORDER — DIPHENHYDRAMINE HYDROCHLORIDE 50 MG/ML
12.5 INJECTION INTRAMUSCULAR; INTRAVENOUS
Status: DISCONTINUED | OUTPATIENT
Start: 2023-05-19 | End: 2023-05-19 | Stop reason: HOSPADM

## 2023-05-19 RX ORDER — SODIUM CHLORIDE, SODIUM LACTATE, POTASSIUM CHLORIDE, CALCIUM CHLORIDE 600; 310; 30; 20 MG/100ML; MG/100ML; MG/100ML; MG/100ML
INJECTION, SOLUTION INTRAVENOUS CONTINUOUS
Status: DISCONTINUED | OUTPATIENT
Start: 2023-05-19 | End: 2023-05-19 | Stop reason: HOSPADM

## 2023-05-19 RX ORDER — KETOROLAC TROMETHAMINE 30 MG/ML
INJECTION, SOLUTION INTRAMUSCULAR; INTRAVENOUS PRN
Status: DISCONTINUED | OUTPATIENT
Start: 2023-05-19 | End: 2023-05-19 | Stop reason: SDUPTHER

## 2023-05-19 RX ORDER — SODIUM CHLORIDE 9 MG/ML
INJECTION, SOLUTION INTRAVENOUS PRN
Status: DISCONTINUED | OUTPATIENT
Start: 2023-05-19 | End: 2023-05-19 | Stop reason: HOSPADM

## 2023-05-19 RX ORDER — HYDRALAZINE HYDROCHLORIDE 20 MG/ML
INJECTION INTRAMUSCULAR; INTRAVENOUS PRN
Status: DISCONTINUED | OUTPATIENT
Start: 2023-05-19 | End: 2023-05-19 | Stop reason: SDUPTHER

## 2023-05-19 RX ORDER — HYDROCODONE BITARTRATE AND ACETAMINOPHEN 5; 325 MG/1; MG/1
1 TABLET ORAL EVERY 6 HOURS PRN
Qty: 28 TABLET | Refills: 0 | Status: SHIPPED | OUTPATIENT
Start: 2023-05-19 | End: 2023-05-26

## 2023-05-19 RX ORDER — LIDOCAINE HYDROCHLORIDE 10 MG/ML
1 INJECTION, SOLUTION EPIDURAL; INFILTRATION; INTRACAUDAL; PERINEURAL
Status: COMPLETED | OUTPATIENT
Start: 2023-05-19 | End: 2023-05-19

## 2023-05-19 RX ORDER — SCOLOPAMINE TRANSDERMAL SYSTEM 1 MG/1
1 PATCH, EXTENDED RELEASE TRANSDERMAL ONCE
Status: DISCONTINUED | OUTPATIENT
Start: 2023-05-19 | End: 2023-05-19 | Stop reason: HOSPADM

## 2023-05-19 RX ORDER — DEXAMETHASONE SODIUM PHOSPHATE 4 MG/ML
INJECTION, SOLUTION INTRA-ARTICULAR; INTRALESIONAL; INTRAMUSCULAR; INTRAVENOUS; SOFT TISSUE PRN
Status: DISCONTINUED | OUTPATIENT
Start: 2023-05-19 | End: 2023-05-19 | Stop reason: SDUPTHER

## 2023-05-19 RX ORDER — CEPHALEXIN 500 MG/1
500 CAPSULE ORAL 3 TIMES DAILY
Qty: 15 CAPSULE | Refills: 0 | Status: SHIPPED | OUTPATIENT
Start: 2023-05-19

## 2023-05-19 RX ORDER — ONDANSETRON 2 MG/ML
4 INJECTION INTRAMUSCULAR; INTRAVENOUS
Status: DISCONTINUED | OUTPATIENT
Start: 2023-05-19 | End: 2023-05-19 | Stop reason: HOSPADM

## 2023-05-19 RX ORDER — ACETAMINOPHEN 500 MG
500 TABLET ORAL ONCE
Status: COMPLETED | OUTPATIENT
Start: 2023-05-19 | End: 2023-05-19

## 2023-05-19 RX ORDER — ONDANSETRON 2 MG/ML
4 INJECTION INTRAMUSCULAR; INTRAVENOUS ONCE
Status: COMPLETED | OUTPATIENT
Start: 2023-05-19 | End: 2023-05-19

## 2023-05-19 RX ORDER — GLYCOPYRROLATE 0.2 MG/ML
INJECTION INTRAMUSCULAR; INTRAVENOUS PRN
Status: DISCONTINUED | OUTPATIENT
Start: 2023-05-19 | End: 2023-05-19 | Stop reason: SDUPTHER

## 2023-05-19 RX ORDER — SODIUM CHLORIDE 0.9 % (FLUSH) 0.9 %
5-40 SYRINGE (ML) INJECTION EVERY 12 HOURS SCHEDULED
Status: DISCONTINUED | OUTPATIENT
Start: 2023-05-19 | End: 2023-05-19 | Stop reason: HOSPADM

## 2023-05-19 RX ORDER — FENTANYL CITRATE 50 UG/ML
INJECTION, SOLUTION INTRAMUSCULAR; INTRAVENOUS PRN
Status: DISCONTINUED | OUTPATIENT
Start: 2023-05-19 | End: 2023-05-19 | Stop reason: SDUPTHER

## 2023-05-19 RX ORDER — METOCLOPRAMIDE HYDROCHLORIDE 5 MG/ML
INJECTION INTRAMUSCULAR; INTRAVENOUS PRN
Status: DISCONTINUED | OUTPATIENT
Start: 2023-05-19 | End: 2023-05-19 | Stop reason: SDUPTHER

## 2023-05-19 RX ORDER — ONDANSETRON 2 MG/ML
INJECTION INTRAMUSCULAR; INTRAVENOUS PRN
Status: DISCONTINUED | OUTPATIENT
Start: 2023-05-19 | End: 2023-05-19 | Stop reason: SDUPTHER

## 2023-05-19 RX ORDER — LIDOCAINE HYDROCHLORIDE 20 MG/ML
INJECTION, SOLUTION EPIDURAL; INFILTRATION; INTRACAUDAL; PERINEURAL PRN
Status: DISCONTINUED | OUTPATIENT
Start: 2023-05-19 | End: 2023-05-19 | Stop reason: SDUPTHER

## 2023-05-19 RX ORDER — CEFAZOLIN SODIUM 1 G/3ML
INJECTION, POWDER, FOR SOLUTION INTRAMUSCULAR; INTRAVENOUS PRN
Status: DISCONTINUED | OUTPATIENT
Start: 2023-05-19 | End: 2023-05-19

## 2023-05-19 RX ADMIN — LIDOCAINE HYDROCHLORIDE 40 MG: 20 INJECTION, SOLUTION EPIDURAL; INFILTRATION; INTRACAUDAL; PERINEURAL at 08:56

## 2023-05-19 RX ADMIN — DEXAMETHASONE SODIUM PHOSPHATE 8 MG: 4 INJECTION, SOLUTION INTRAMUSCULAR; INTRAVENOUS at 09:10

## 2023-05-19 RX ADMIN — KETOROLAC TROMETHAMINE 15 MG: 30 INJECTION, SOLUTION INTRAMUSCULAR at 09:23

## 2023-05-19 RX ADMIN — Medication 2000 MG: at 09:07

## 2023-05-19 RX ADMIN — LIDOCAINE HYDROCHLORIDE 1 ML: 10 INJECTION, SOLUTION EPIDURAL; INFILTRATION; INTRACAUDAL; PERINEURAL at 07:11

## 2023-05-19 RX ADMIN — FENTANYL CITRATE 50 MCG: 50 INJECTION, SOLUTION INTRAMUSCULAR; INTRAVENOUS at 08:56

## 2023-05-19 RX ADMIN — FAMOTIDINE 20 MG: 10 INJECTION, SOLUTION INTRAVENOUS at 08:12

## 2023-05-19 RX ADMIN — GLYCOPYRROLATE 0.2 MG: 0.2 INJECTION INTRAMUSCULAR; INTRAVENOUS at 09:06

## 2023-05-19 RX ADMIN — PROPOFOL 150 MG: 10 INJECTION, EMULSION INTRAVENOUS at 08:56

## 2023-05-19 RX ADMIN — Medication 20 MG: at 09:03

## 2023-05-19 RX ADMIN — HYDRALAZINE HYDROCHLORIDE 10 MG: 20 INJECTION INTRAMUSCULAR; INTRAVENOUS at 09:19

## 2023-05-19 RX ADMIN — PROPOFOL 50 MG: 10 INJECTION, EMULSION INTRAVENOUS at 08:58

## 2023-05-19 RX ADMIN — PHENYLEPHRINE HYDROCHLORIDE 100 MCG: 10 INJECTION INTRAVENOUS at 09:00

## 2023-05-19 RX ADMIN — ACETAMINOPHEN 500 MG: 500 TABLET ORAL at 07:11

## 2023-05-19 RX ADMIN — ONDANSETRON 4 MG: 2 INJECTION INTRAMUSCULAR; INTRAVENOUS at 09:23

## 2023-05-19 RX ADMIN — SODIUM CHLORIDE, POTASSIUM CHLORIDE, SODIUM LACTATE AND CALCIUM CHLORIDE: 600; 310; 30; 20 INJECTION, SOLUTION INTRAVENOUS at 07:11

## 2023-05-19 RX ADMIN — FENTANYL CITRATE 50 MCG: 50 INJECTION, SOLUTION INTRAMUSCULAR; INTRAVENOUS at 09:15

## 2023-05-19 RX ADMIN — ONDANSETRON 4 MG: 2 INJECTION INTRAMUSCULAR; INTRAVENOUS at 08:12

## 2023-05-19 RX ADMIN — METOCLOPRAMIDE HYDROCHLORIDE 10 MG: 5 INJECTION INTRAMUSCULAR; INTRAVENOUS at 09:10

## 2023-05-19 ASSESSMENT — PAIN DESCRIPTION - DESCRIPTORS: DESCRIPTORS: DISCOMFORT

## 2023-05-19 ASSESSMENT — ENCOUNTER SYMPTOMS
DIARRHEA: 0
TROUBLE SWALLOWING: 0
VOMITING: 0
ABDOMINAL PAIN: 1
NAUSEA: 1
WHEEZING: 0
SHORTNESS OF BREATH: 0
BLOOD IN STOOL: 0
CONSTIPATION: 1
RHINORRHEA: 0
SORE THROAT: 0
COUGH: 0
ANAL BLEEDING: 0

## 2023-05-19 ASSESSMENT — PAIN - FUNCTIONAL ASSESSMENT: PAIN_FUNCTIONAL_ASSESSMENT: 0-10

## 2023-05-19 NOTE — H&P
MCHC 32 - 36 g/dL 33.9    RDW 11.5 - 15.0 % 15.7 High     Platelets 527 - 105 [id number]/N 193    MPV 7 - 12 fL 8.4    % neutrophils % 57.1    % lymphocytes % 29.1    % monocytes % 11.4    % eosinophils % 1.8    % Basophils % 0.6    Neutrophils Absolute (A) 1.5 - 6.6 X10E9/L 3.5    Lymphocytes Absolute 1.0 - 3.5 X10E9/L 1.8    Monocytes Absolute 0 - 0.9 X10E9/L 0.7    Eosinophils Absolute 0.0 - 0.4 X10E9/L 0.1    Basophils Absolute 0.0 - 0.2 X10E9/L 0.0    Resulting [address] LAB   Specimen Collected: 05/08/23 03:42 Last Resulted: 05/08/23 04:22   Received From: [facility]  Result Received: 05/17/23 11:42     PROVISIONAL DIAGNOSES / SURGERY:      Right ureteral stone [N20.1]    CYSTOSCOPY RIGHT URETEROSCOPY HOLMIUM LASER RIGHT STENT EXCHANGE    Patient Active Problem List    Diagnosis Date Noted    Acute kidney injury ([address] 05/08/2023    Hydronephrosis with renal and ureteral calculous obstruction 05/08/2023    Hypertensive urgency 03/29/2023    Deficient smooth pursuit eye movements 06/22/2022    Bowel and bladder incontinence 05/16/2022    Gait disturbance 05/16/2022    Migraine variant, intractable 05/16/2022    Normal pressure hydrocephalus ([address] 05/16/2022    Subjective memory complaints 05/16/2022    Bilateral high frequency sensorineural hearing loss 03/08/2022    Ear popping, bilateral 02/14/2022    Insomnia due to medical condition 07/28/2021    Neurogenic bladder 07/28/2021    Balance disorder 06/30/2021    Bilateral tinnitus 06/30/2021    Vertigo 06/30/2021    Localized, primary osteoarthritis of hand 03/11/2021    Pain in joint involving pelvic region and thigh 03/11/2021    Right hip pain 03/11/2021    Dementia without behavioral disturbance ([address] 07/02/2020    Cervical myelopathy ([facility] [address] 04/22/2020    Anxiety 04/14/2020    Arthritis 04/14/2020    Depressive disorder 04/14/2020    Migraine headache 04/14/2020    Memory loss     Current mild episode of major depressive

## 2023-05-19 NOTE — ANESTHESIA PRE PROCEDURE
Department of Anesthesiology  Preprocedure Note       Name:  Jas Goldman   Age:  66 y.o.  :  1945                                          MRN:  [id number]         Date:  2023      Surgeon: Makeda Veras):  Elyssa Meyers MD    Procedure: Procedure(s):  CYSTOSCOPY RIGHT URETEROSCOPY HOLMIUM LASER RIGHT STENT EXCHANGE    Medications prior to admission:   Prior to Admission medications    Medication Sig Start Date End Date Taking? Authorizing Provider   hydrALAZINE (APRESOLINE) 25 MG tablet Take 1 tablet by mouth every 8 hours 3/30/23   Enma Garcia MD   losartan (COZAAR) 50 MG tablet Take 1 tablet by mouth daily 3/30/23   Enma Garcia MD   atorvastatin (LIPITOR) 20 MG tablet Take 1 tablet by mouth daily    Historical Provider, MD   carvedilol (COREG) 6.25 MG tablet Take 1 tablet by mouth 2 times daily 3/29/23   Patrick Mayen DO   oxybutynin (DITROPAN) 5 MG tablet  21   Historical Provider, MD   tamsulosin (FLOMAX) 0.4 MG capsule Take 1 capsule by mouth daily  Patient not taking: Reported on 2022 10/19/21   Elyssa Meyers MD   ondansetron (ZOFRAN) 4 MG tablet Take 1 tablet by mouth every 6 hours as needed for Nausea or Vomiting 3/26/21   María Elena Salgado MD   ondansetron (ZOFRAN ODT) 4 MG disintegrating tablet Take 1 tablet by mouth every 8 hours as needed for Nausea 20   Marty Phan MD   Calcium Carb-Cholecalciferol (CALCIUM-VITAMIN D) 500-200 MG-UNIT per tablet Take 1 tablet by mouth daily   Patient not taking: Reported on 2023    Historical Provider, MD   Denosumab (PROLIA SC) Inject into the skin    Historical Provider, MD   ALPRAZolam Terrea Meg) 0.5 MG tablet Take 1 tablet by mouth as needed.  prn 13   Historical Provider, MD       Current medications:    Current Facility-Administered Medications   Medication Dose Route Frequency Provider Last Rate Last Admin    lactated ringers IV soln infusion   IntraVENous Continuous Cathjeovany Mejía  mL/hr at 23 4880 New

## 2023-05-19 NOTE — ANESTHESIA POSTPROCEDURE EVALUATION
Department of Anesthesiology  Postprocedure Note    Patient: Wander Walter  MRN: [id number]  [place]: 1945  Date of evaluation: 5/19/2023      Procedure Summary     Date: 05/19/23 Room / Location: [address] [place] [address] / [facility]: LEIA WAGGONER    Anesthesia Start: 0848 Anesthesia Stop: 1409    Procedure: CYSTOSCOPY RIGHT URETEROSCOPY HOLMIUM LASER RIGHT STENT EXCHANGE (Ureter) Diagnosis:       Right ureteral stone      (Right ureteral stone [N20.1])    Surgeons: Elisa Pérez MD Responsible Provider: Cara Huang MD    Anesthesia Type: general ASA Status: 3          Anesthesia Type: No value filed.     Castillo Phase I: Castillo Score: 7    Castillo Phase II: Castillo Score: 10      Anesthesia Post Evaluation    Comments: POST- ANESTHESIA EVALUATION       Pt Name: Wander Walter  MRN: [id number]  [place]: 1945  Date of evaluation: 5/19/2023  Time:  12:51 PM      BP (!) 142/72   Pulse 90   Temp 97.5 °F (36.4 °C) (Infrared)   Resp 16   Ht 4' 11\" (1.499 m)   Wt 137 lb (62.1 kg)   SpO2 96%   BMI 27.67 kg/m²      Consciousness Level  Awake  Cardiopulmonary Status  Stable  Pain Adequately Treated YES  Nausea / Vomiting  NO  Adequate Hydration  YES  Anesthesia Related Complications NONE      Electronically signed by Cara Huang MD on 5/19/2023 at 12:51 PM

## 2023-05-19 NOTE — BRIEF OP NOTE
Brief Postoperative Note      Patient: Stanley Dennis  YOB: 1945  MRN: [id number]    Date of Procedure: 5/19/2023    Pre-Op Diagnosis Codes:     * Right ureteral stone [N20.1]    Post-Op Diagnosis: Same       Procedure(s):  CYSTOSCOPY RIGHT URETEROSCOPY HOLMIUM LASER RIGHT STENT EXCHANGE    Surgeon(s):  Marcus Magallanes MD    Assistant:  * No surgical staff found *    Anesthesia: General    Estimated Blood Loss (mL): Minimal    Complications: None    Specimens:   * No specimens in log *    Implants:  * No implants in log *      Drains: * No LDAs found *    Findings: d      Electronically signed by Marcus Magallanes MD on 5/19/2023 at 7:55 AM

## 2023-05-20 NOTE — OP NOTE
[address]                 [address] [facility], [address]                                OPERATIVE REPORT    PATIENT NAME: Agustin Banda                 :        1945  MED REC NO:   [id number]                              ROOM:  ACCOUNT NO:   [de-identified]                           ADMIT DATE: 2023  PROVIDER:     Lore Goldberg    DATE OF PROCEDURE:  2023    PREOPERATIVE DIAGNOSIS:  Right renal stone. POSTOPERATIVE DIAGNOSIS:  Right renal stone. OPERATION PERFORMED:  Cystoscopy, right stent removal, right  ureteroscopy, right holmium laser lithotripsy, and right stent  placement. SURGEON:  Lore Goldberg MD    ASSISTANT:  None. ANESTHESIA:  General.    COMPLICATIONS:  None. ESTIMATED BLOOD LOSS:  Minimal.    NARRATIVE OF THE PROCEDURE:  This is a 66-year-old white female who has  a history of right 7-mm stone in her kidney. She had a previous stent  placed retained. She is here for definitive stone surgery. All the  risks and benefits were explained to the patient. Informed consent was  obtained. OPERATIVE PROCEDURE:  This 66-year-old white female was brought back to  the operating room and positioned in the modified dorsal lithotomy  position. After general anesthesia was started, she was sterilely  prepped and draped in the standard fashion. A 22-[place] rigid  cystoscope was inserted into her urethra and advanced into her bladder. Bladder was examined. No tumors or stones were noted. The right stent  was identified. It was grasped and removed through the urethral meatus. A wire was placed through this and a dual-lumen ureteral catheter was  used to place a second wire. Then, I advanced a flexible ureteroscope  over the first wire into the bladder, into the right ureter, and into  the kidney. I examined all calyces of the kidney. The stone was  identified in the mid pole calyx.   I used a 200-micron laser fiber

## 2023-05-22 ENCOUNTER — TELEPHONE (OUTPATIENT)
Dept: UROLOGY | Age: 78
End: 2023-05-22

## 2023-05-22 NOTE — TELEPHONE ENCOUNTER
Pain post surgery- does not feel like empty, dull pain. Patient told heat / ice every other every 15mins, stay on ibuprofen  and/ or tylenol for pain. If pain increases or worsens go to nearest ER. Cysto stent  removal in office.  6/1/23    Patient understand all instruction

## 2023-05-24 ENCOUNTER — TELEPHONE (OUTPATIENT)
Dept: UROLOGY | Age: 78
End: 2023-05-24

## 2023-05-24 DIAGNOSIS — Z96.0 URETERAL STENT PRESENT: ICD-10-CM

## 2023-05-24 DIAGNOSIS — N32.89 BLADDER SPASMS: Primary | ICD-10-CM

## 2023-05-24 DIAGNOSIS — R39.9 UTI SYMPTOMS: ICD-10-CM

## 2023-05-24 DIAGNOSIS — G89.18 POST-OPERATIVE PAIN: ICD-10-CM

## 2023-05-24 RX ORDER — HYOSCYAMINE SULFATE 0.12 MG/1
0.12 TABLET SUBLINGUAL EVERY 4 HOURS PRN
Qty: 15 EACH | Refills: 0 | Status: SHIPPED | OUTPATIENT
Start: 2023-05-24 | End: 2023-05-31

## 2023-05-24 NOTE — TELEPHONE ENCOUNTER
Patient calls in complaining of extreme pain since procedure. Felt good day after surgery but is now experiencing diarrhea, pain on both sides, cramping and spasms. Feels like she's been beat up. Patient wants to know if this is normal or what she should do. Please advise.

## 2023-05-24 NOTE — TELEPHONE ENCOUNTER
D/w Myron Archer for spasms, SE reviewed. Urine culture to rule out infection  Patient declines pain medication, states she does not tolerate. Rotate otc analgesics prn.    ER if severe pain not relieved by medications above, or if fevers develop.

## 2023-05-26 ENCOUNTER — TELEPHONE (OUTPATIENT)
Dept: UROLOGY | Age: 78
End: 2023-05-26

## 2023-05-26 DIAGNOSIS — B37.9 YEAST INFECTION: Primary | ICD-10-CM

## 2023-05-26 DIAGNOSIS — N89.8 VAGINAL DISCHARGE: ICD-10-CM

## 2023-05-26 DIAGNOSIS — N89.8 VAGINAL ITCHING: ICD-10-CM

## 2023-05-31 RX ORDER — FLUCONAZOLE 150 MG/1
150 TABLET ORAL DAILY
Qty: 2 TABLET | Refills: 0 | Status: SHIPPED | OUTPATIENT
Start: 2023-05-31 | End: 2023-06-01

## 2023-05-31 NOTE — TELEPHONE ENCOUNTER
Writer called to move Cysto, pt stated she called in on Friday with vaginal itching, now has a full blown yeast infection. Due to have stent removed tomorrow. Also has blood in urine.

## 2023-06-01 ENCOUNTER — PROCEDURE VISIT (OUTPATIENT)
Dept: UROLOGY | Age: 78
End: 2023-06-01

## 2023-06-01 DIAGNOSIS — N20.0 KIDNEY STONES: Primary | ICD-10-CM

## 2023-06-06 ENCOUNTER — TELEPHONE (OUTPATIENT)
Dept: UROLOGY | Age: 78
End: 2023-06-06

## 2023-06-06 NOTE — TELEPHONE ENCOUNTER
Patient called in complaining of pressure in bladder since having her stent removed. She also stated in [employer] message that she spoke with Ed Nelson on Sunday and he advised her to call the office.      Writer left patient a voicemail to see if she is able to come in for a PVR

## 2023-06-06 NOTE — TELEPHONE ENCOUNTER
Patient declined doing a urine sample. She stated \"My pain is in my back near my kidneys. If the doctor is not worried, I'm not going to worry either. What should I do in the meantime? \"    Writer spoke with ANNIE Delgado.  She will reach out to patient

## 2023-06-07 ENCOUNTER — OFFICE VISIT (OUTPATIENT)
Dept: UROLOGY | Age: 78
End: 2023-06-07
Payer: MEDICARE

## 2023-06-07 ENCOUNTER — HOSPITAL ENCOUNTER (OUTPATIENT)
Age: 78
Setting detail: SPECIMEN
Discharge: HOME | End: 2023-06-07

## 2023-06-07 VITALS
HEART RATE: 64 BPM | WEIGHT: 137 LBS | TEMPERATURE: 97.7 F | SYSTOLIC BLOOD PRESSURE: 171 MMHG | BODY MASS INDEX: 27.62 KG/M2 | HEIGHT: 59 IN | DIASTOLIC BLOOD PRESSURE: 85 MMHG

## 2023-06-07 DIAGNOSIS — R10.9 BILATERAL FLANK PAIN: ICD-10-CM

## 2023-06-07 DIAGNOSIS — R39.89 SENSATION OF PRESSURE IN BLADDER AREA: ICD-10-CM

## 2023-06-07 DIAGNOSIS — N20.0 KIDNEY STONES: Primary | ICD-10-CM

## 2023-06-07 DIAGNOSIS — Z87.442 HISTORY OF KIDNEY STONES: ICD-10-CM

## 2023-06-07 DIAGNOSIS — R10.9 BILATERAL FLANK PAIN: Primary | ICD-10-CM

## 2023-06-07 LAB
BILIRUBIN, POC: NORMAL
BLOOD URINE, POC: POSITIVE
CLARITY, POC: NORMAL
COLOR, POC: YELLOW
GLUCOSE URINE, POC: NEGATIVE
KETONES, POC: NORMAL
LEUKOCYTE EST, POC: NEGATIVE
NITRITE, POC: NEGATIVE
PH, POC: NORMAL
POST VOID RESIDUAL (PVR): NORMAL ML
PROTEIN, POC: NEGATIVE
SPECIFIC GRAVITY, POC: NORMAL
UROBILINOGEN, POC: NORMAL

## 2023-06-07 PROCEDURE — 1090F PRES/ABSN URINE INCON ASSESS: CPT | Performed by: NURSE PRACTITIONER

## 2023-06-07 PROCEDURE — 99214 OFFICE O/P EST MOD 30 MIN: CPT | Performed by: NURSE PRACTITIONER

## 2023-06-07 PROCEDURE — G8427 DOCREV CUR MEDS BY ELIG CLIN: HCPCS | Performed by: NURSE PRACTITIONER

## 2023-06-07 PROCEDURE — G8417 CALC BMI ABV UP PARAM F/U: HCPCS | Performed by: NURSE PRACTITIONER

## 2023-06-07 PROCEDURE — 3079F DIAST BP 80-89 MM HG: CPT | Performed by: NURSE PRACTITIONER

## 2023-06-07 PROCEDURE — 3077F SYST BP >= 140 MM HG: CPT | Performed by: NURSE PRACTITIONER

## 2023-06-07 PROCEDURE — 81002 URINALYSIS NONAUTO W/O SCOPE: CPT | Performed by: NURSE PRACTITIONER

## 2023-06-07 PROCEDURE — G8399 PT W/DXA RESULTS DOCUMENT: HCPCS | Performed by: NURSE PRACTITIONER

## 2023-06-07 PROCEDURE — 1036F TOBACCO NON-USER: CPT | Performed by: NURSE PRACTITIONER

## 2023-06-07 PROCEDURE — 1123F ACP DISCUSS/DSCN MKR DOCD: CPT | Performed by: NURSE PRACTITIONER

## 2023-06-07 ASSESSMENT — ENCOUNTER SYMPTOMS
EYE PAIN: 0
ABDOMINAL PAIN: 0
DIARRHEA: 0
SHORTNESS OF BREATH: 0
VOMITING: 0
COUGH: 0
NAUSEA: 0
EYE REDNESS: 0
CONSTIPATION: 0
WHEEZING: 0
BACK PAIN: 1

## 2023-06-07 NOTE — PROGRESS NOTES
Review of Systems   Constitutional:  Negative for chills, fatigue and fever. Eyes:  Negative for pain, redness and visual disturbance. Respiratory:  Negative for cough, shortness of breath and wheezing. Cardiovascular:  Negative for chest pain and leg swelling. Gastrointestinal:  Negative for abdominal pain, constipation, diarrhea, nausea and vomiting. Genitourinary:  Negative for difficulty urinating, dysuria, flank pain, frequency, hematuria and urgency. Musculoskeletal:  Positive for back pain. Negative for joint swelling and myalgias. Skin:  Negative for rash and wound. Neurological:  Negative for dizziness, tremors, weakness and numbness. Hematological:  Does not bruise/bleed easily.
[facility])     pre diabetic    Diverticulosis     Gait disturbance 05/16/2022    Hearing loss     Hyperlipidemia     Hypertension     Kidney stone     Memory loss     MS (multiple sclerosis) (HCC)     Osteoarthritis     Osteoporosis     Pericardial effusion 08/07/2014    PONV (postoperative nausea and vomiting)     Right ureteral stone     Vertigo      Past Surgical History:   Procedure Laterality Date    BLADDER SUSPENSION      CATARACT EXTRACTION Bilateral     CERVICAL DISCECTOMY  04/22/2020    Dr. Sunny Leigh a [address]  04/22/2020    [facility] [place], Sunny Leigh MD    COLONOSCOPY      CYSTOSCOPY N/A 5/19/2023    CYSTOSCOPY RIGHT URETEROSCOPY HOLMIUM LASER RIGHT STENT EXCHANGE performed by Nomi Mcbride MD at [address] Right 05/09/2023    DONE  [address]  03/01/2017    ESOPHAGOGASTRODUODENOSCOPY  03/01/2017    EYE SURGERY      x3    HERNIA REPAIR  05/2017    INGUINAL- PATIENT IS UNSURE CONCERNING THIS HX    KNEE SURGERY      menicus right 2007    LAPAROSCOPIC NISSEN FUNDOPLICATION  32/86/7781    Procedure: LAPAROSCOPIC PARAESOPHAGEAL HERNIA REPAIR/ NISSEN FUNDOPLICATION/EGD; Surgeon: Pk Rincon MD; Location: [facility]; Service: General; Laterality    LUMBAR PUNCTURE      SHOULDER SURGERY      right rotator cuff 2016    SKIN BIOPSY      SUPERFICIAL KERATECTOMY Right     TUBAL LIGATION       Family History   Adopted: Yes   Problem Relation Age of Onset    Hypertension Mother      No outpatient medications have been marked as taking for the 6/7/23 encounter (Office Visit) with JAY Germain CNP.       (All medications reviewed and updated by provider sincelast office visit or hospitalization)   Ciprofloxacin, Lisinopril, and Codeine  Social History     Tobacco Use   Smoking Status Never   Smokeless Tobacco Never      (If patient a smoker, smoking cessation counseling offered)     Social History     Substance

## 2023-06-07 NOTE — PATIENT INSTRUCTIONS
B/l low back. Urine culture now- I will call with results. Renal us now- I will call with results. Needs to complete litholink prior to f/u in September. Needs f/u with Dr. Armando Pena.

## 2023-06-09 LAB
MICROORGANISM SPEC CULT: ABNORMAL
SPECIMEN DESCRIPTION: ABNORMAL

## 2023-07-14 ENCOUNTER — HOSPITAL ENCOUNTER (OUTPATIENT)
Dept: CT IMAGING | Age: 78
End: 2023-07-14
Payer: MEDICARE

## 2023-07-14 DIAGNOSIS — R10.9 BILATERAL FLANK PAIN: ICD-10-CM

## 2023-07-14 DIAGNOSIS — N13.30 HYDRONEPHROSIS OF LEFT KIDNEY: ICD-10-CM

## 2023-07-14 PROCEDURE — 74176 CT ABD & PELVIS W/O CONTRAST: CPT

## 2023-08-03 ENCOUNTER — OFFICE VISIT (OUTPATIENT)
Dept: UROLOGY | Age: 78
End: 2023-08-03
Payer: MEDICARE

## 2023-08-03 VITALS — HEIGHT: 59 IN | BODY MASS INDEX: 27.62 KG/M2 | TEMPERATURE: 97 F | WEIGHT: 137 LBS

## 2023-08-03 DIAGNOSIS — N20.0 KIDNEY STONES: ICD-10-CM

## 2023-08-03 DIAGNOSIS — R33.9 URINE RETENTION: Primary | ICD-10-CM

## 2023-08-03 LAB
BILIRUBIN, POC: NORMAL
BLOOD URINE, POC: NORMAL
CLARITY, POC: CLEAR
COLOR, POC: NORMAL
GLUCOSE URINE, POC: NORMAL
KETONES, POC: NORMAL
LEUKOCYTE EST, POC: NORMAL
NITRITE, POC: NORMAL
PH, POC: NORMAL
POST VOID RESIDUAL (PVR): NORMAL ML
PROTEIN, POC: NORMAL
SPECIFIC GRAVITY, POC: NORMAL
UROBILINOGEN, POC: NORMAL

## 2023-08-03 PROCEDURE — 99214 OFFICE O/P EST MOD 30 MIN: CPT | Performed by: UROLOGY

## 2023-08-03 PROCEDURE — 81002 URINALYSIS NONAUTO W/O SCOPE: CPT | Performed by: UROLOGY

## 2023-08-03 PROCEDURE — G8417 CALC BMI ABV UP PARAM F/U: HCPCS | Performed by: UROLOGY

## 2023-08-03 PROCEDURE — 51798 US URINE CAPACITY MEASURE: CPT | Performed by: UROLOGY

## 2023-08-03 PROCEDURE — 1090F PRES/ABSN URINE INCON ASSESS: CPT | Performed by: UROLOGY

## 2023-08-03 PROCEDURE — G8427 DOCREV CUR MEDS BY ELIG CLIN: HCPCS | Performed by: UROLOGY

## 2023-08-03 PROCEDURE — 1123F ACP DISCUSS/DSCN MKR DOCD: CPT | Performed by: UROLOGY

## 2023-08-03 PROCEDURE — 1036F TOBACCO NON-USER: CPT | Performed by: UROLOGY

## 2023-08-03 PROCEDURE — G8399 PT W/DXA RESULTS DOCUMENT: HCPCS | Performed by: UROLOGY

## 2023-08-03 ASSESSMENT — ENCOUNTER SYMPTOMS
RESPIRATORY NEGATIVE: 1
COUGH: 0
CONSTIPATION: 0
WHEEZING: 0
GASTROINTESTINAL NEGATIVE: 1
DIARRHEA: 0
ABDOMINAL PAIN: 0
EYES NEGATIVE: 1
SHORTNESS OF BREATH: 0
BACK PAIN: 0
EYE PAIN: 0
VOMITING: 0
NAUSEA: 0
EYE REDNESS: 0

## 2023-08-03 NOTE — PROGRESS NOTES
Review of Systems   Constitutional: Negative. Negative for appetite change, chills and fever. Eyes: Negative. Negative for pain, redness and visual disturbance. Respiratory: Negative. Negative for cough, shortness of breath and wheezing. Cardiovascular: Negative. Negative for chest pain and leg swelling. Gastrointestinal: Negative. Negative for abdominal pain, constipation, diarrhea, nausea and vomiting. Genitourinary:  Positive for frequency (only at night. Up 3xs). Negative for difficulty urinating, dysuria, flank pain, hematuria and urgency. Musculoskeletal: Negative. Negative for back pain, joint swelling and myalgias. Skin: Negative. Negative for rash and wound. Neurological: Negative. Negative for dizziness, tremors and numbness. Hematological: Negative. Does not bruise/bleed easily.

## 2023-08-03 NOTE — PROGRESS NOTES
[address]  [address] [facility] [address]  Dept: [address] [place] [address] Urology Office Note - Established    Patient:  Heath Evans  YOB: 1945  Date: 8/3/2023    The patient is a 66 y.o. female who presents todayfor evaluation of the following problems:   Chief Complaint   Patient presents with    Nephrolithiasis       HPI  Here for stones, discussed dietary changes. Discussed lemonade and decreased sodium. Reviewed litholink  Reviewed ct     Summary of old records: N/A    Additional History: N/A    Procedures Today: N/A    Urinalysis today:  Results for POC orders placed in visit on 08/03/23   POCT Urinalysis no Micro   Result Value Ref Range    Color, UA Yellolw     Clarity, UA Clear     Glucose, UA POC Neg     Bilirubin, UA      Ketones, UA      Spec Grav, UA      Blood, UA POC Neg     pH, UA      Protein, UA POC Neg     Urobilinogen, UA      Leukocytes, UA Neg     Nitrite, UA Neg      Last several PSA's:  No results found for: PSA  Last total testosterone:  No results found for: TESTOSTERONE    AUA Symptom Score (8/3/2023):                                Last BUN and creatinine:  Lab Results   Component Value Date    BUN 19 03/29/2023     Lab Results   Component Value Date    CREATININE 0.87 03/29/2023       Additional Lab/Culture results: none    Imaging Reviewed during this Office Visit: none  (results were independently reviewed by physician and radiology report verified)    PAST MEDICAL, FAMILY AND SOCIAL HISTORY UPDATE:  Past Medical History:   Diagnosis Date    Anxiety     Arthritis     Balance disorder 06/30/2021    Cataracts, bilateral     Colitis     Depression     Diabetes mellitus ([address])     pre diabetic    Diverticulosis     Gait disturbance 05/16/2022    Hearing loss     Hyperlipidemia     Hypertension     Kidney stone     Memory loss     MS (multiple sclerosis) ([address])     Osteoarthritis

## 2023-10-31 NOTE — TELEPHONE ENCOUNTER
Patient called in this morning needing to get anand . But patient has a busy schedule has appts all weeks. I advised her to go to Er if symptoms do not subside . Also informed her to let primary care know about her symptoms . Patient will call us back later to schedule anand when she is ready.
Please keep incision clean and dry, dressing can be removed/ changed on post-op day # 3, do not submerge wound in water for prolonged periods of time, pat dry after showering, and do not use any creams or ointments to incision.  Please do not engage in strenuous activity, heavy lifting, drive, please notify physician if fevers, bleeding, swelling, pain not relieved by medication, increased sluggishness or irritability, increased nausea or vomiting, inability to tolerate foods or liquids.  No heavy lifting/straining, Showering allowed, Stairs allowed, Walking - Indoors allowed, Walking - Outdoors allowed, Follow Instructions Provided by your Surgical Team  Do not start any Motrin /Aspirin NSAIDS( Motrin, Advil.... until cleared by your neurosurgeon

## 2023-11-02 ENCOUNTER — APPOINTMENT (OUTPATIENT)
Dept: GENERAL RADIOLOGY | Age: 78
End: 2023-11-02
Payer: MEDICARE

## 2023-11-02 ENCOUNTER — APPOINTMENT (OUTPATIENT)
Dept: CT IMAGING | Age: 78
End: 2023-11-02
Payer: MEDICARE

## 2023-11-02 ENCOUNTER — HOSPITAL ENCOUNTER (EMERGENCY)
Age: 78
Discharge: ANOTHER HOSPITAL | End: 2023-11-03
Attending: EMERGENCY MEDICINE
Payer: MEDICARE

## 2023-11-02 DIAGNOSIS — R29.6 RECURRENT FALLS: Primary | ICD-10-CM

## 2023-11-02 DIAGNOSIS — N30.01 ACUTE CYSTITIS WITH HEMATURIA: ICD-10-CM

## 2023-11-02 DIAGNOSIS — R93.0 ABNORMAL HEAD CT: ICD-10-CM

## 2023-11-02 LAB
ANION GAP SERPL CALCULATED.3IONS-SCNC: 8 MMOL/L (ref 9–17)
ATYPICAL LYMPHOCYTE ABSOLUTE COUNT: 0.28 K/UL
ATYPICAL LYMPHOCYTES: 5 %
BACTERIA URNS QL MICRO: ABNORMAL
BASOPHILS # BLD: 0 K/UL (ref 0–0.2)
BASOPHILS NFR BLD: 0 % (ref 0–2)
BILIRUB UR QL STRIP: NEGATIVE
BNP SERPL-MCNC: 448 PG/ML
BUN SERPL-MCNC: 28 MG/DL (ref 8–23)
CALCIUM SERPL-MCNC: 10.2 MG/DL (ref 8.6–10.4)
CHLORIDE SERPL-SCNC: 107 MMOL/L (ref 98–107)
CLARITY UR: CLEAR
CO2 SERPL-SCNC: 27 MMOL/L (ref 20–31)
COLOR UR: YELLOW
CREAT SERPL-MCNC: 0.8 MG/DL (ref 0.5–0.9)
EOSINOPHIL # BLD: 0.17 K/UL (ref 0–0.4)
EOSINOPHILS RELATIVE PERCENT: 3 % (ref 1–4)
EPI CELLS #/AREA URNS HPF: ABNORMAL /HPF (ref 0–5)
ERYTHROCYTE [DISTWIDTH] IN BLOOD BY AUTOMATED COUNT: 15.7 % (ref 12.5–15.4)
GFR SERPL CREATININE-BSD FRML MDRD: >60 ML/MIN/1.73M2
GLUCOSE SERPL-MCNC: 105 MG/DL (ref 70–99)
GLUCOSE UR STRIP-MCNC: NEGATIVE MG/DL
HCT VFR BLD AUTO: 39.2 % (ref 36–46)
HGB BLD-MCNC: 13.2 G/DL (ref 12–16)
HGB UR QL STRIP.AUTO: NEGATIVE
KETONES UR STRIP-MCNC: NEGATIVE MG/DL
LEUKOCYTE ESTERASE UR QL STRIP: ABNORMAL
LYMPHOCYTES NFR BLD: 1.51 K/UL (ref 1–4.8)
LYMPHOCYTES RELATIVE PERCENT: 27 % (ref 24–44)
MCH RBC QN AUTO: 33 PG (ref 26–34)
MCHC RBC AUTO-ENTMCNC: 33.8 G/DL (ref 31–37)
MCV RBC AUTO: 97.7 FL (ref 80–100)
MONOCYTES NFR BLD: 0.5 K/UL (ref 0.1–0.8)
MONOCYTES NFR BLD: 9 % (ref 1–7)
MORPHOLOGY: ABNORMAL
MUCOUS THREADS URNS QL MICRO: ABNORMAL
NEUTROPHILS NFR BLD: 56 % (ref 36–66)
NEUTS SEG NFR BLD: 3.14 K/UL (ref 1.8–7.7)
NITRITE UR QL STRIP: NEGATIVE
PH UR STRIP: 6 [PH] (ref 5–8)
PLATELET # BLD AUTO: 241 K/UL (ref 140–450)
PMV BLD AUTO: 8.6 FL (ref 6–12)
POTASSIUM SERPL-SCNC: 5 MMOL/L (ref 3.7–5.3)
PROT UR STRIP-MCNC: NEGATIVE MG/DL
RBC # BLD AUTO: 4.01 M/UL (ref 4–5.2)
RBC #/AREA URNS HPF: ABNORMAL /HPF (ref 0–2)
SODIUM SERPL-SCNC: 142 MMOL/L (ref 135–144)
SP GR UR STRIP: 1.02 (ref 1–1.03)
TROPONIN I SERPL HS-MCNC: 7 NG/L (ref 0–14)
TSH SERPL DL<=0.05 MIU/L-ACNC: 1.14 UIU/ML (ref 0.3–5)
UROBILINOGEN UR STRIP-ACNC: NORMAL EU/DL (ref 0–1)
WBC #/AREA URNS HPF: ABNORMAL /HPF (ref 0–5)
WBC OTHER # BLD: 5.6 K/UL (ref 3.5–11)

## 2023-11-02 PROCEDURE — 81001 URINALYSIS AUTO W/SCOPE: CPT

## 2023-11-02 PROCEDURE — 36415 COLL VENOUS BLD VENIPUNCTURE: CPT

## 2023-11-02 PROCEDURE — 72125 CT NECK SPINE W/O DYE: CPT

## 2023-11-02 PROCEDURE — 93005 ELECTROCARDIOGRAM TRACING: CPT | Performed by: EMERGENCY MEDICINE

## 2023-11-02 PROCEDURE — 72131 CT LUMBAR SPINE W/O DYE: CPT

## 2023-11-02 PROCEDURE — 70450 CT HEAD/BRAIN W/O DYE: CPT

## 2023-11-02 PROCEDURE — 70486 CT MAXILLOFACIAL W/O DYE: CPT

## 2023-11-02 PROCEDURE — 6360000002 HC RX W HCPCS: Performed by: EMERGENCY MEDICINE

## 2023-11-02 PROCEDURE — 80048 BASIC METABOLIC PNL TOTAL CA: CPT

## 2023-11-02 PROCEDURE — 84443 ASSAY THYROID STIM HORMONE: CPT

## 2023-11-02 PROCEDURE — 71046 X-RAY EXAM CHEST 2 VIEWS: CPT

## 2023-11-02 PROCEDURE — 85025 COMPLETE CBC W/AUTO DIFF WBC: CPT

## 2023-11-02 PROCEDURE — 90715 TDAP VACCINE 7 YRS/> IM: CPT | Performed by: EMERGENCY MEDICINE

## 2023-11-02 PROCEDURE — 83880 ASSAY OF NATRIURETIC PEPTIDE: CPT

## 2023-11-02 PROCEDURE — 84484 ASSAY OF TROPONIN QUANT: CPT

## 2023-11-02 PROCEDURE — 99285 EMERGENCY DEPT VISIT HI MDM: CPT

## 2023-11-02 PROCEDURE — 6370000000 HC RX 637 (ALT 250 FOR IP): Performed by: EMERGENCY MEDICINE

## 2023-11-02 PROCEDURE — 87086 URINE CULTURE/COLONY COUNT: CPT

## 2023-11-02 PROCEDURE — 90471 IMMUNIZATION ADMIN: CPT | Performed by: EMERGENCY MEDICINE

## 2023-11-02 RX ORDER — HYDRALAZINE HYDROCHLORIDE 25 MG/1
25 TABLET, FILM COATED ORAL EVERY 8 HOURS SCHEDULED
Status: DISCONTINUED | OUTPATIENT
Start: 2023-11-02 | End: 2023-11-03

## 2023-11-02 RX ORDER — ATORVASTATIN CALCIUM 10 MG/1
20 TABLET, FILM COATED ORAL NIGHTLY
Status: DISCONTINUED | OUTPATIENT
Start: 2023-11-02 | End: 2023-11-03 | Stop reason: HOSPADM

## 2023-11-02 RX ORDER — CEPHALEXIN 250 MG/1
500 CAPSULE ORAL 2 TIMES DAILY
Status: DISCONTINUED | OUTPATIENT
Start: 2023-11-03 | End: 2023-11-03 | Stop reason: HOSPADM

## 2023-11-02 RX ORDER — CEPHALEXIN 250 MG/1
500 CAPSULE ORAL ONCE
Status: COMPLETED | OUTPATIENT
Start: 2023-11-02 | End: 2023-11-02

## 2023-11-02 RX ORDER — ONDANSETRON 4 MG/1
4 TABLET, ORALLY DISINTEGRATING ORAL EVERY 8 HOURS PRN
Status: DISCONTINUED | OUTPATIENT
Start: 2023-11-02 | End: 2023-11-03 | Stop reason: HOSPADM

## 2023-11-02 RX ORDER — ACETAMINOPHEN 325 MG/1
650 TABLET ORAL EVERY 6 HOURS PRN
Status: DISCONTINUED | OUTPATIENT
Start: 2023-11-02 | End: 2023-11-03 | Stop reason: HOSPADM

## 2023-11-02 RX ORDER — LOSARTAN POTASSIUM 25 MG/1
25 TABLET ORAL DAILY
Status: DISCONTINUED | OUTPATIENT
Start: 2023-11-03 | End: 2023-11-03 | Stop reason: HOSPADM

## 2023-11-02 RX ADMIN — CEPHALEXIN 500 MG: 250 CAPSULE ORAL at 18:33

## 2023-11-02 RX ADMIN — TETANUS TOXOID, REDUCED DIPHTHERIA TOXOID AND ACELLULAR PERTUSSIS VACCINE, ADSORBED 0.5 ML: 5; 2.5; 8; 8; 2.5 SUSPENSION INTRAMUSCULAR at 18:34

## 2023-11-02 ASSESSMENT — PAIN - FUNCTIONAL ASSESSMENT: PAIN_FUNCTIONAL_ASSESSMENT: 0-10

## 2023-11-02 ASSESSMENT — PAIN DESCRIPTION - LOCATION: LOCATION: NOSE

## 2023-11-02 ASSESSMENT — PATIENT HEALTH QUESTIONNAIRE - PHQ9
SUM OF ALL RESPONSES TO PHQ QUESTIONS 1-9: 0
2. FEELING DOWN, DEPRESSED OR HOPELESS: 0
SUM OF ALL RESPONSES TO PHQ QUESTIONS 1-9: 0
SUM OF ALL RESPONSES TO PHQ9 QUESTIONS 1 & 2: 0
1. LITTLE INTEREST OR PLEASURE IN DOING THINGS: 0

## 2023-11-02 ASSESSMENT — ENCOUNTER SYMPTOMS
TROUBLE SWALLOWING: 0
VOICE CHANGE: 0
NAUSEA: 0
PHOTOPHOBIA: 0
VOMITING: 0

## 2023-11-02 ASSESSMENT — LIFESTYLE VARIABLES: HOW OFTEN DO YOU HAVE A DRINK CONTAINING ALCOHOL: MONTHLY OR LESS

## 2023-11-02 NOTE — ED NOTES
[facility] paged through answering service.       Leatha Pereira RN  11/02/23 0653

## 2023-11-03 VITALS
HEART RATE: 71 BPM | TEMPERATURE: 97.7 F | DIASTOLIC BLOOD PRESSURE: 94 MMHG | SYSTOLIC BLOOD PRESSURE: 188 MMHG | OXYGEN SATURATION: 98 % | WEIGHT: 130 LBS | RESPIRATION RATE: 16 BRPM | BODY MASS INDEX: 26.26 KG/M2

## 2023-11-03 LAB
ANION GAP SERPL CALCULATED.3IONS-SCNC: 9 MMOL/L (ref 9–17)
BASOPHILS # BLD: 0 K/UL (ref 0–0.2)
BASOPHILS NFR BLD: 1 % (ref 0–2)
BUN SERPL-MCNC: 21 MG/DL (ref 8–23)
CALCIUM SERPL-MCNC: 9.6 MG/DL (ref 8.6–10.4)
CHLORIDE SERPL-SCNC: 107 MMOL/L (ref 98–107)
CO2 SERPL-SCNC: 25 MMOL/L (ref 20–31)
CREAT SERPL-MCNC: 0.8 MG/DL (ref 0.5–0.9)
EKG ATRIAL RATE: 66 BPM
EKG P AXIS: 32 DEGREES
EKG P-R INTERVAL: 148 MS
EKG Q-T INTERVAL: 392 MS
EKG QRS DURATION: 70 MS
EKG QTC CALCULATION (BAZETT): 410 MS
EKG R AXIS: 12 DEGREES
EKG T AXIS: 38 DEGREES
EKG VENTRICULAR RATE: 66 BPM
EOSINOPHIL # BLD: 0.3 K/UL (ref 0–0.4)
EOSINOPHILS RELATIVE PERCENT: 5 % (ref 1–4)
ERYTHROCYTE [DISTWIDTH] IN BLOOD BY AUTOMATED COUNT: 15.5 % (ref 12.5–15.4)
GFR SERPL CREATININE-BSD FRML MDRD: >60 ML/MIN/1.73M2
GLUCOSE SERPL-MCNC: 100 MG/DL (ref 70–99)
HCT VFR BLD AUTO: 38.7 % (ref 36–46)
HGB BLD-MCNC: 13 G/DL (ref 12–16)
LYMPHOCYTES NFR BLD: 1.6 K/UL (ref 1–4.8)
LYMPHOCYTES RELATIVE PERCENT: 29 % (ref 24–44)
MCH RBC QN AUTO: 32.8 PG (ref 26–34)
MCHC RBC AUTO-ENTMCNC: 33.7 G/DL (ref 31–37)
MCV RBC AUTO: 97.1 FL (ref 80–100)
MICROORGANISM SPEC CULT: NORMAL
MONOCYTES NFR BLD: 0.6 K/UL (ref 0.1–1.2)
MONOCYTES NFR BLD: 10 % (ref 2–11)
NEUTROPHILS NFR BLD: 55 % (ref 36–66)
NEUTS SEG NFR BLD: 3 K/UL (ref 1.8–7.7)
PLATELET # BLD AUTO: 245 K/UL (ref 140–450)
PMV BLD AUTO: 7.8 FL (ref 6–12)
POTASSIUM SERPL-SCNC: 4 MMOL/L (ref 3.7–5.3)
RBC # BLD AUTO: 3.98 M/UL (ref 4–5.2)
SODIUM SERPL-SCNC: 141 MMOL/L (ref 135–144)
SPECIMEN DESCRIPTION: NORMAL
WBC OTHER # BLD: 5.5 K/UL (ref 3.5–11)

## 2023-11-03 PROCEDURE — 6370000000 HC RX 637 (ALT 250 FOR IP): Performed by: EMERGENCY MEDICINE

## 2023-11-03 PROCEDURE — 80048 BASIC METABOLIC PNL TOTAL CA: CPT

## 2023-11-03 PROCEDURE — 36415 COLL VENOUS BLD VENIPUNCTURE: CPT

## 2023-11-03 PROCEDURE — 85025 COMPLETE CBC W/AUTO DIFF WBC: CPT

## 2023-11-03 RX ORDER — CARVEDILOL 3.12 MG/1
6.25 TABLET ORAL 2 TIMES DAILY WITH MEALS
Status: DISCONTINUED | OUTPATIENT
Start: 2023-11-03 | End: 2023-11-03 | Stop reason: HOSPADM

## 2023-11-03 RX ADMIN — ACETAMINOPHEN 650 MG: 325 TABLET ORAL at 08:37

## 2023-11-03 RX ADMIN — CEPHALEXIN 500 MG: 250 CAPSULE ORAL at 08:39

## 2023-11-03 RX ADMIN — CARVEDILOL 6.25 MG: 3.12 TABLET, FILM COATED ORAL at 08:42

## 2023-11-03 RX ADMIN — CARVEDILOL 6.25 MG: 3.12 TABLET, FILM COATED ORAL at 00:38

## 2023-11-03 RX ADMIN — LOSARTAN POTASSIUM 25 MG: 25 TABLET, FILM COATED ORAL at 08:39

## 2023-11-03 RX ADMIN — ATORVASTATIN CALCIUM 20 MG: 10 TABLET, FILM COATED ORAL at 00:38

## 2023-11-03 ASSESSMENT — PAIN - FUNCTIONAL ASSESSMENT: PAIN_FUNCTIONAL_ASSESSMENT: 0-10

## 2023-11-03 ASSESSMENT — PAIN DESCRIPTION - DESCRIPTORS: DESCRIPTORS: ACHING

## 2023-11-03 ASSESSMENT — PAIN DESCRIPTION - LOCATION
LOCATION: HEAD

## 2023-11-03 ASSESSMENT — PAIN DESCRIPTION - ORIENTATION
ORIENTATION: RIGHT
ORIENTATION: RIGHT

## 2023-11-03 NOTE — ED NOTES
Pt and friend updated on status of transfer bed and plan of transfer time.      Kayy Flores RN  11/03/23 [id number]

## 2023-11-03 NOTE — ED NOTES
Spoke with Maya Leung with [facility] access was given room & report #   Room # A25  Report # [phone] & [phone]      Afshin Ramsay RN  11/03/23 [id number]

## 2023-11-03 NOTE — ED NOTES
Dunn irrigated per MD orders. 60 cc sterile H2O instilled but unable to aspirate back out. Once drainage tubing reconnected to dunn, 75cc pink OP drained into urimeter @ BS. Pt tolerated well, VSS, XAVIERTM.   Report called to Noreen Elizabeth RN at [facility] - neurology.  [facility] [place]  11/03/23 2580

## 2023-11-03 NOTE — ED NOTES
[facility] contacted for update - transfer remains discharge dependent.  Will update pt     Richa Wayne RN  11/03/23 [id number]

## 2024-02-07 NOTE — TELEPHONE ENCOUNTER
Cancer History:   Treatment Synopsis:    E. RECTOSIGMOID COLON MASS, BIOPSY: ADENOCARCINOMA.   MISMATCH REPAIR PROTEIN EXPRESSION:     Protein: Result:   MLH-1 Expression Present    PMS-2 Expression Present   MSH-2 Expression Present   MSH-6 Expression Present     INTERPRETATION: Colon neoplasm with normal mismatch   repair protein expression.     FINAL DIAGNOSIS   A. SIGMOID COLON STAPLE LINE DISTAL:    --MODERATELY DIFFERENTIATED COLONIC ADENOCARCINOMA, 5.0 CM, WITH INVASION   THROUGH THE MUSCULARIS PROPRIA AND INTO PERICOLIC TISSUE, SEE SUMMARY.   --MARGINS OF EXCISION ARE NEGATIVE FOR CARCINOMA.   --MESENTERIC LYMPH NODES ARE NEGATIVE  FOR MALIGNANCY (0/11), SEE COMMENT.   --TUMOR DEPOSITS, TWO.      Procedure: Sigmoidectomy   Tumor Site: Sigmoid colon   Tumor Size: 5.0 cm   Macroscopic Tumor Perforation: Not identified   Histologic Type: Adenocarcinoma    Histologic Grade: G2   Tumor Extension: Tumor invades through the muscularis propria into pericolonic   tissue   Margins: All margins are uninvolved by invasive carcinoma, high grade   dysplasia/intramucosal carcinoma, and low grade dysplasia    Treatment Effect: No known presurgical therapy   Lymphovascular Invasion: Not identified   Perineural Invasion: Not identified   Tumor Deposits: Present, 2 deposits   Number of Lymph Nodes Involved: 0   Number of Lymph Nodes Examined:  11   Pathologic Staging (pTNM): pT3 pN1c   pt3, pN1c, M0 stage IIIB, July 2020  Duodenal nodule fine-needle biopsy revealed neuroendocrine tumor grade 1 on August 24, 2020  Case is Amended   FINAL DIAGNOSIS   A. STOMACH, DISTAL GASTRECTOMY: 4/22/2021    WELL DIFFERENTIATED G CELL NEUROENDOCRINE TUMOR, GRADE 1:  DUODENUM   METASTATIC WELL DIFFERENTIATED NEUROENDOCRINE TUMOR: LYMPH NODES, 2 OF 15   DISTAL GASTRECTOMYAND PROXIMAL DUODENAL RESECTION SPECIMEN   pT2, pN1, M0 stage IIIA                      History of Present Illness:      ID Statement:    MORENA GUNDERSON is a 67 year old  Pt called in stating the pain, cramping & trouble urinating has not eased up at all. Pt states she was so scared last night about not being able to urinate that she almost went to the ED. She is unable to see urology until 8/21 due to the physician being out of down & the office is moving. Pt is wondering if you can send in a different medication to get her through to the urology appt or send a referral to a different urologist. Please advise. 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     The patient is a 64 year old man with a past medical history significant for CAD, HTN, Hyperlipidemia, dizziness fatigue and cancer.  The patient was doing well until he started having bowel movement complaints in early July, which prompted a colonoscopy  that revealed a mass in the sigmoid colon. He also obtained laparoscopic sigmoidectomy that revealed sigmoid mass and 2 tumor deposits, 0 out of 14 lymph nodes were positive. CT scan did not reveal  any metastatic disease but an upper endoscopy did reveal  duodenal nodules. The patient underwent nodule removal and is now 2 weeks s/p the surgery.     The patient had come for a follow up on 2/7/2024 regarding his history of colon cancer s/p resection.  Recall at last visit we discussed that his upper endoscopy and biopsy of duodenal nodule revealed grade 1 neuroendocrine tumor.  The patient has received  cycle 12 of adjuvant chemotherapy using FOLFOX and been tolerating well. Path report on 05/05/2021 indicated pt3, pN1c, M0 stage IIIB neuroendocrine tumor of the duodenum s/p gastrectomy and resection of the  non-secretory tumor. The patient reports flank  pain but is overall doing well. He denies any history of abdominal pain, diarrhea, wheezing and lymphadenopathy.      Past Medical History:  1. Adenocarcinoma of the sigmoid colon  2. CAD   3. HTN   4. Hyperlipidemia  5. Dizziness   6. Fatigue  7. Left Kidney lesion  8. Anxiety and Depression       Past Surgical History:  1. CABG (quadruple bypass)  2. Cholecystectomy   3. Lesion removals  4.  Status post pacemaker insertion in May 2022 for bradycardia and dizziness.  Stress test was reportedly normal.  Family  Medical History:  1. CAD   2. HTN     Last colonoscopy was in 07/2020.           Review of Systems:   ·  System Review All other systems have been reviewed and are negative for complaint.      · Constitutional NEGATIVE: Fever, Chills, Anorexia, Weight Loss, Malaise      · Eyes NEGATIVE: Blurry Vision, Drainage, Diploplia, Redness, Vision Loss/ Change      · ENMT NEGATIVE: Nasal Discharge, Nasal Congestion, Ear Pain, Mouth Pain, Throat Pain      · Respiratory NEGATIVE: Dry Cough, Productive Cough, Hemoptysis, Wheezing, Shortness of Breath      · Cardiology NEGATIVE: Chest Pain, Dyspnea on Exertion, Orthopnea, Palpitations, Syncope      ·  Gastrointestinal POSITIVE: Nausea     NEGATIVE: Abdominal Pain, Constipation, Diarrhea, Vomiting      · Genitourinary NEGATIVE: Discharge, Dysuria, Flank Pain, Frequency, Hematuria      · Musculoskeletal NEGATIVE: Decreased ROM, Pain, Swelling, Stiffness, Weakness      · Neurological NEGATIVE: Dizziness, Confusion, Headache, Seizures, Syncope      · Psychiatric NEGATIVE: Mood Changes, Anxiety, Hallucinations, Sleep Changes, Suicidal Ideas      · Skin NEGATIVE: Mass, Pain, Pruritus, Rash, Ulcer      · Endocrine NEGATIVE: Heat Intolerance, Cold Intolerance, Sweat, Polyuria, Thirst      · Hematologic/Lymph NEGATIVE: Anemia, Bruising, Easy Bleeding, Night Sweats, Petechiae      · Allergic/Immunologic NEGATIVE: Anaphylaxis, Itchy/ Teary Eyes, Itching, Sneezing, Swelling      · Breast NEGATIVE: Pain, Mass, Discharge, Nipple Itching, Gynecomastia         Allergies and Intolerances:       Intolerances:         Demerol: Drug, Nausea/Vomiting, Active     Outpatient Medication Profile:  * Patient Currently Takes Medications as of 08-Aug-2023 10:21 documented in Structured Notes         MiraLax oral powder for reconstitution : Last Dose Taken:  , 17 gram(s) orally once a day, As needed, Constipation, Start Date: 30-Mar-2023         Colace 100 mg oral capsule: Last Dose Taken:  , 1 cap(s) orally  2 times  a day as needed for constipation , Start Date: 30-Mar-2023         Metoprolol Succinate ER 25 mg oral tablet, extended release: Last Dose  Taken:  , 1 tab(s) orally once a day, Start Date: 30-Mar-2023         acetaminophen 325 mg oral tablet: Last Dose Taken:  , 2 tab(s) orally  every 4 hours, As needed, Pain - Mild (1-3), Start Date: 26-Apr-2022         atorvastatin 80 mg oral tablet: Last Dose Taken:  , 1 tab(s) orally once  a day (at bedtime)         amLODIPine 10 mg oral tablet: Last Dose Taken:  , 1 tab(s) orally once  a day (in the morning)         lisinopril 20 mg oral tablet: Last Dose Taken:  , 1 tab(s) orally once  a day (in the morning)         citalopram 40 mg oral tablet: Last Dose Taken:  , 1 tab(s) orally once  a day (in the morning)         gabapentin 300 mg oral capsule: Last Dose Taken:  , 1 cap(s) orally 2  times a day         omeprazole 20 mg oral delayed release tablet: Last Dose Taken:  , 1 tab(s)  orally 2 times a day         famotidine 20 mg oral tablet: Last Dose Taken:  , 1 tab(s) orally 2 times  a day         clonazePAM 1 mg oral tablet: Last Dose Taken:  , 1 tab(s) orally in the  morning and at bedtime         aspirin 81 mg oral tablet: Last Dose Taken:  , 1 tab(s) oral once a day             Medical History:         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, Status: Active         Iron deficiency anemia: ICD-10: D50.9, Status: Active         Fatigue: ICD-10: R53.83, Status: Active         Dizziness: ICD-10: R42, Status: Active         Hyperlipidemia: ICD-10: E78.5, Status: Active         HTN (hypertension): ICD-10: I10, Status: Active         CAD (coronary artery disease): ICD-10: I25.10, Status: Active         Colon cancer: ICD-10: C18.9, Status: Active       Surg History:         Low iron: ICD-10: E61.1, Status: Active         Family history of cholecystectomy: ICD-10: Z83.79, Status:  Active         Status post coronary artery bypass graft: ICD-10: Z95.1,  Status: Active     Family  History: Family history of coronary artery  disease (Father Age Unknown)  Family history of hypertension (Father Age Unknown)  Family history of hypertension (Mother Age Unknown)      Social History:   Social Substance History:  ·  Smoking Status never smoker (1)   ·  Additional History     65 year old male    with no children  No substance history (1)           Vitals and Measurements:   Vitals: Temp: 36.6  HR: 72  RR: 18  BP: 118/80  SPO2%:   97   Measurements: HT(cm): 159.7  WT(kg): 85.6  BSA: 1.94   BMI:  33.5      Physical Exam:      Constitutional: Well developed, awake/alert/oriented  x3, no distress, alert and cooperative   Eyes: PERRL, EOMI, clear sclera   ENMT: mucous membranes moist, no apparent injury,  no lesions seen   Head/Neck: Neck supple, no apparent injury, thyroid  without mass or tenderness, No JVD, trachea midline, no bruits   Respiratory/Thorax: Patent airways, CTAB, normal  breath sounds with good chest expansion, thorax symmetric   Cardiovascular: Regular, rate and rhythm, no murmurs,  2+ equal pulses of the extremities, normal S 1and S 2   Gastrointestinal: Nondistended, soft, non-tender,  no rebound tenderness or guarding, no masses palpable, no organomegaly, +BS, no bruits   Genitourinary: No Discharge, vesicles or other abnormalities   Musculoskeletal: ROM intact, no joint swelling, normal  strength   Extremities: normal extremities, no cyanosis edema,  contusions or wounds, no clubbing   Neurological: alert and oriented x3, intact senses,  motor, response and reflexes, normal strength   Breast: No masses, tenderness, no discharge or discoloration   Lymphatic: No significant lymphadenopathy   Psychological: Appropriate mood and behavior   Skin: Warm and dry, no lesions, no rashes         Lab Results:           Radiology Result:     ·  Results           Impression:     CHEST:  1.  No focal infiltrate, pulmonary nodule or lymphadenopathy  identified.  2. Fusiform dilatation of the  ascending thoracic aorta, similar to  the prior study.        ABDOMEN-PELVIS:  1.  Postoperative changes, as above.  2. No new or enlarging mass or lymphadenopathy identified.  3. No evidence of bowel obstruction, free intraperitoneal air or  abnormal intra-abdominal fluid collection.      CT Chest Abdomen Pelvis w/wo IV Contrast [Feb 8 2022  1:50PM]        Assessment and Plan:         The patient was referred to for further evaluation and management of recently diagnosed adenocarcinoma of the sigmoid colon.      1. Adenocarcinoma of the sigmoid colon     The patient is a 64 year old man with a past medical history significant for CAD, HTN, Hyperlipidemia, dizziness fatigue and cancer.  The patient was doing well until he started having bowel movement complaints in early July, which prompted a colonoscopy  that revealed a mass in the sigmoid colon. He also obtained laparoscopic studies that revealed 2 tumor deposits. CT scan did not reveal  any metastatic disease but an upper endoscopy did reveal the nodules. The patient underwent nodule removal and is  now 2 weeks s/p the surgery.   We discussed that his diagnosis is stage III adenocarcinoma of the sigmoid colon and that she should initiate adjuvant chemotherapy to achieve absolute benefit. I offered him the following options:  1. Do nothing  2. Adjuvant chemotherapy using FOLFOX  The patient would like to proceed with chemotherapy every 2 weeks using FOLFOX.  Effect side effects explained.     The patient had come for a follow up on March February 7, 2024. Physical exam was within normal limits. I reviewed the lab data with the patient. Has tolerated Cycle 12 of chemotherapy using FOLFOX the patient is grateful for all the details. Path report on 05/05/2021  indicated pT3, pN1, M0 stage IIIA  low grade neuroendocrine tumor of the duodenum. The patient remains s/p gastrectomy and resection of the  non-secretory tumor. No treatement indicated for neuroendocrine  tumor however, the patient should be followed  clinically.  Restaging CT scan of chest abdomen pelvis in December 2022 did not reveal any evidence of local or distant recurrence, surgery related changes.  The patient is pleased.  CT scan of chest abdomen pelvis on October 2, 2023 did not reveal any evidence of local or distal recurrence.     2. CAD   - Managed by PCP, cardiology and cardiac surgery   - Anticoagulation with Lovenox 40 mg   -S/p pacemaker insertion in May 2022  3. HTN   - Managed by PCP, cardiology and cardiac surgery   - Metoprolol 50 mg   - Lisinopril 5 mg      4. Hyperlipidemia  - Atorvastatin 80 mg      5. Dizziness      6. Fatigue     7. Left Kidney lesion      - Surgical intervention     8. Mental health/ anxiety and depression   - Counseling services   - Celexa 40mg   - Klonopin 1 mg

## 2024-04-09 ENCOUNTER — APPOINTMENT (OUTPATIENT)
Dept: GENERAL RADIOLOGY | Age: 79
End: 2024-04-09
Payer: MEDICARE

## 2024-04-09 ENCOUNTER — APPOINTMENT (OUTPATIENT)
Dept: CT IMAGING | Age: 79
End: 2024-04-09
Payer: MEDICARE

## 2024-04-09 ENCOUNTER — HOSPITAL ENCOUNTER (EMERGENCY)
Age: 79
Discharge: HOME | End: 2024-04-09
Attending: EMERGENCY MEDICINE
Payer: MEDICARE

## 2024-04-09 VITALS
TEMPERATURE: 98.3 F | HEART RATE: 88 BPM | WEIGHT: 175 LBS | OXYGEN SATURATION: 97 % | DIASTOLIC BLOOD PRESSURE: 90 MMHG | RESPIRATION RATE: 17 BRPM | SYSTOLIC BLOOD PRESSURE: 174 MMHG
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
## 2024-04-09 LAB
25(OH)D3 SERPL-MCNC: 36.3 NG/ML (ref 30–100)
ABO + RH BLD: NORMAL
ALBUMIN SERPL-MCNC: 4.5 G/DL (ref 3.5–5.2)
ANION GAP SERPL CALCULATED.3IONS-SCNC: 12 MMOL/L (ref 9–16)
ARM BAND NUMBER: NORMAL
BLOOD BANK SAMPLE EXPIRATION: NORMAL
BLOOD BANK SPECIMEN: ABNORMAL
BLOOD GROUP ANTIBODIES SERPL: NEGATIVE
BODY TEMPERATURE: 37
BUN SERPL-MCNC: 21 MG/DL (ref 8–23)
CHLORIDE SERPL-SCNC: 105 MMOL/L (ref 98–107)
CO2 SERPL-SCNC: 23 MMOL/L (ref 20–31)
COHGB MFR BLD: 3.7 % (ref 0–5)
CREAT SERPL-MCNC: 1 MG/DL (ref 0.5–0.9)
ERYTHROCYTE [DISTWIDTH] IN BLOOD BY AUTOMATED COUNT: 13.7 % (ref 11.8–14.4)
EST. AVERAGE GLUCOSE BLD GHB EST-MCNC: 114 MG/DL
ETHANOL PERCENT: <0.01 %
ETHANOLAMINE SERPL-MCNC: <10 MG/DL
FIO2 ON VENT: ABNORMAL %
GFR SERPL CREATININE-BSD FRML MDRD: 40 ML/MIN/1.73M2
GLUCOSE SERPL-MCNC: 110 MG/DL (ref 74–99)
HBA1C MFR BLD: 5.6 % (ref 4–6)
HCG SERPL QL: NEGATIVE
HCO3 VENOUS: 23.7 MMOL/L (ref 24–30)
HCT VFR BLD AUTO: 47 % (ref 36.3–47.1)
HGB BLD-MCNC: 14.9 G/DL (ref 11.9–15.1)
INR PPP: 0.9
MCH RBC QN AUTO: 31.7 PG (ref 25.2–33.5)
MCHC RBC AUTO-ENTMCNC: 31.7 G/DL (ref 28.4–34.8)
MCV RBC AUTO: 100 FL (ref 82.6–102.9)
MYOGLOBIN SERPL-MCNC: 53 NG/ML (ref 25–58)
NRBC BLD-RTO: 0 PER 100 WBC
O2 SAT, VEN: 63.7 % (ref 60–85)
PARTIAL THROMBOPLASTIN TIME: 24.9 SEC (ref 23–36.5)
PCO2, VEN: 37.4 MM HG (ref 39–55)
PH VENOUS: 7.42 (ref 7.32–7.42)
PLATELET # BLD AUTO: 252 K/UL (ref 138–453)
PMV BLD AUTO: 9.9 FL (ref 8.1–13.5)
PO2, VEN: 32.2 MM HG (ref 30–50)
POSITIVE BASE EXCESS, VEN: 0.1 MMOL/L (ref 0–2)
POTASSIUM SERPL-SCNC: 4.1 MMOL/L (ref 3.7–5.3)
PROTHROMBIN TIME: 12.3 SEC (ref 11.7–14.9)
RBC # BLD AUTO: 4.7 M/UL (ref 3.95–5.11)
SODIUM SERPL-SCNC: 140 MMOL/L (ref 136–145)
T4 FREE SERPL-MCNC: 1.2 NG/DL (ref 0.92–1.68)
TROPONIN I SERPL HS-MCNC: 7 NG/L (ref 0–14)
TSH SERPL DL<=0.05 MIU/L-ACNC: 2.27 UIU/ML (ref 0.27–4.2)
VIT B12 SERPL-MCNC: 473 PG/ML (ref 232–1245)
WBC OTHER # BLD: 6 K/UL (ref 3.5–11.3)

## 2024-04-09 PROCEDURE — 72125 CT NECK SPINE W/O DYE: CPT

## 2024-04-09 PROCEDURE — 84439 ASSAY OF FREE THYROXINE: CPT

## 2024-04-09 PROCEDURE — 6370000000 HC RX 637 (ALT 250 FOR IP): Performed by: STUDENT IN AN ORGANIZED HEALTH CARE EDUCATION/TRAINING PROGRAM

## 2024-04-09 PROCEDURE — 86850 RBC ANTIBODY SCREEN: CPT

## 2024-04-09 PROCEDURE — 85730 THROMBOPLASTIN TIME PARTIAL: CPT

## 2024-04-09 PROCEDURE — 82805 BLOOD GASES W/O2 SATURATION: CPT

## 2024-04-09 PROCEDURE — 86901 BLOOD TYPING SEROLOGIC RH(D): CPT

## 2024-04-09 PROCEDURE — 73130 X-RAY EXAM OF HAND: CPT

## 2024-04-09 PROCEDURE — 83874 ASSAY OF MYOGLOBIN: CPT

## 2024-04-09 PROCEDURE — 99284 EMERGENCY DEPT VISIT MOD MDM: CPT | Performed by: SURGERY

## 2024-04-09 PROCEDURE — 82040 ASSAY OF SERUM ALBUMIN: CPT

## 2024-04-09 PROCEDURE — 82607 VITAMIN B-12: CPT

## 2024-04-09 PROCEDURE — 84484 ASSAY OF TROPONIN QUANT: CPT

## 2024-04-09 PROCEDURE — G0480 DRUG TEST DEF 1-7 CLASSES: HCPCS

## 2024-04-09 PROCEDURE — 6810039001 HC L1 TRAUMA PRIORITY

## 2024-04-09 PROCEDURE — 82306 VITAMIN D 25 HYDROXY: CPT

## 2024-04-09 PROCEDURE — 83036 HEMOGLOBIN GLYCOSYLATED A1C: CPT

## 2024-04-09 PROCEDURE — 6360000002 HC RX W HCPCS: Performed by: STUDENT IN AN ORGANIZED HEALTH CARE EDUCATION/TRAINING PROGRAM

## 2024-04-09 PROCEDURE — 84703 CHORIONIC GONADOTROPIN ASSAY: CPT

## 2024-04-09 PROCEDURE — 71045 X-RAY EXAM CHEST 1 VIEW: CPT

## 2024-04-09 PROCEDURE — 96374 THER/PROPH/DIAG INJ IV PUSH: CPT

## 2024-04-09 PROCEDURE — 99285 EMERGENCY DEPT VISIT HI MDM: CPT

## 2024-04-09 PROCEDURE — 82565 ASSAY OF CREATININE: CPT

## 2024-04-09 PROCEDURE — 80051 ELECTROLYTE PANEL: CPT

## 2024-04-09 PROCEDURE — 86900 BLOOD TYPING SEROLOGIC ABO: CPT

## 2024-04-09 PROCEDURE — 82947 ASSAY GLUCOSE BLOOD QUANT: CPT

## 2024-04-09 PROCEDURE — 96375 TX/PRO/DX INJ NEW DRUG ADDON: CPT

## 2024-04-09 PROCEDURE — 85610 PROTHROMBIN TIME: CPT

## 2024-04-09 PROCEDURE — 84443 ASSAY THYROID STIM HORMONE: CPT

## 2024-04-09 PROCEDURE — 93005 ELECTROCARDIOGRAM TRACING: CPT | Performed by: EMERGENCY MEDICINE

## 2024-04-09 PROCEDURE — 84520 ASSAY OF UREA NITROGEN: CPT

## 2024-04-09 PROCEDURE — 72170 X-RAY EXAM OF PELVIS: CPT

## 2024-04-09 PROCEDURE — 70450 CT HEAD/BRAIN W/O DYE: CPT

## 2024-04-09 PROCEDURE — 85027 COMPLETE CBC AUTOMATED: CPT

## 2024-04-09 PROCEDURE — 93005 ELECTROCARDIOGRAM TRACING: CPT | Performed by: NURSE PRACTITIONER

## 2024-04-09 RX ORDER — ACETAMINOPHEN 500 MG
1000 TABLET ORAL EVERY 6 HOURS PRN
Qty: 8 TABLET | Refills: 0 | Status: SHIPPED | OUTPATIENT
Start: 2024-04-09

## 2024-04-09 RX ORDER — ACETAMINOPHEN 500 MG
1000 TABLET ORAL EVERY 6 HOURS PRN
Qty: 8 TABLET | Refills: 0 | Status: SHIPPED | OUTPATIENT
Start: 2024-04-09 | End: 2024-04-09

## 2024-04-09 RX ORDER — ACETAMINOPHEN 500 MG
1000 TABLET ORAL ONCE
Status: COMPLETED | OUTPATIENT
Start: 2024-04-09 | End: 2024-04-09

## 2024-04-09 RX ORDER — LABETALOL HYDROCHLORIDE 5 MG/ML
10 INJECTION, SOLUTION INTRAVENOUS ONCE
Status: COMPLETED | OUTPATIENT
Start: 2024-04-09 | End: 2024-04-09

## 2024-04-09 RX ORDER — HYDRALAZINE HYDROCHLORIDE 20 MG/ML
10 INJECTION INTRAMUSCULAR; INTRAVENOUS ONCE
Status: COMPLETED | OUTPATIENT
Start: 2024-04-09 | End: 2024-04-09

## 2024-04-09 RX ADMIN — LABETALOL HYDROCHLORIDE 10 MG: 5 INJECTION, SOLUTION INTRAVENOUS at 14:31

## 2024-04-09 RX ADMIN — HYDRALAZINE HYDROCHLORIDE 10 MG: 20 INJECTION INTRAMUSCULAR; INTRAVENOUS at 15:11

## 2024-04-09 RX ADMIN — ACETAMINOPHEN 1000 MG: 500 TABLET ORAL at 15:11

## 2024-04-09 ASSESSMENT — ENCOUNTER SYMPTOMS
SHORTNESS OF BREATH: 0
PHOTOPHOBIA: 0
VOMITING: 0
DIARRHEA: 0
WHEEZING: 0
NAUSEA: 0
RESPIRATORY NEGATIVE: 1
ABDOMINAL DISTENTION: 0
VOICE CHANGE: 0
BACK PAIN: 0
GASTROINTESTINAL NEGATIVE: 1

## 2024-04-09 ASSESSMENT — LIFESTYLE VARIABLES: HOW OFTEN DO YOU HAVE A DRINK CONTAINING ALCOHOL: PATIENT DECLINED

## 2024-04-09 NOTE — PROCEDURES
C- Spine Evaluation for Spine Clearance:    Pt is a 64 yo. female who was seen in the ED  s/p fall from standing height.   Pt w/ complaints of frontal scalp pain.      C-Spine precautions of C-collar with spinal neutrality maintained since arrival with current exam directed at further evaluation of spine for clearance purposes.    Pt chart and current images reviewed.  CT C-Spine negative for acute fracture, subluxation, or traumatic injury.  Patient does not have a distracting injury, is not acutely intoxicated and is alert, oriented and fully able to participate in exam.      Pt denies c-spine pain while resting in c-collar.  C-collar removed w/ c-spine neutrality maintained.  Pt denies midline pain with palpation of spinous processes and axial loading.  Pt demonstrated full flexion, extension, and SB ROM without complaints of pain.        TLS precautions of supine position maintained since arrival.  Pt denies midline pain with palpation of spinous processes    C-spine is considered cleared w/out need for further imaging, evaluation, or continuation of c-collar.  TLS considered clear w/out need for further imagine, evaluation, or continuation of supine bedrest precautions.    Electronically signed by Deborah Humphreys MD on 4/9/2024 at 4:36 PM

## 2024-04-09 NOTE — H&P
TRAUMA H&P/CONSULT    PATIENT NAME:  Trauma Robert  YOB: 1880  MEDICAL RECORD NO. [id number]   DATE: 4/9/2024  PRIMARY CARE PHYSICIAN: No primary care provider on file.  PATIENT EVALUATED AT THE REQUEST OF : Geoffrey    ACTIVATION   Trauma Priority    There is no problem list on file for this patient.      IMPRESSION AND PLAN:       Fall   Check EKG due to reports of recent arrhythmias   Follow up CT head and neck  Hx of MS    If intracranial hemorrhage is present, is it a:  [] BIG 1  [] BIG 2  [] BIG 3  If chest wall injury: Rib score___       HISTORY:     Chief Complaint:  \"Fall\"    GENERAL DATA  Patient information was obtained from patient and EMS personnel.  History/Exam limitations: none.  Injury Date: 4/9/2024   Approximate Injury Time: 1300        Transport mode: Ambulance  Referring Hospital:     SETTING OF TRAUMATIC EVENT   Location : Other: restaurant  Specific Details of Location: Other: outside    MECHANISM OF INJURY    Fall From standing      HISTORY:      Trauma Robert is a female that presented to the Emergency Department following a fall from standing. Patient reportedly was walking out of a restaurant when she fell.  Reportedly does have a recent history of cardiac arrhythmia but unsure the details.    Traumatic loss of Consciousness: No    Total Fluids Given Prior To Arrival unknown mL    MEDICATIONS:   []  None     []  Information not available due to exam limitations documented above  Medication Sig Start Date End Date Taking? Authorizing Provider   hydrALAZINE (APRESOLINE) 25 MG tablet Take 1 tablet by mouth every 8 hours 3/30/23     Chele Shannon MD   losartan (COZAAR) 50 MG tablet Take 1 tablet by mouth daily 3/30/23     Chele Shannon MD   atorvastatin (LIPITOR) 20 MG tablet Take 1 tablet by mouth daily       ProviderGurdeep MD   carvedilol (COREG) 6.25 MG tablet Take 1 tablet by mouth 2 times daily 3/29/23     Thomas Mayen DO   ondansetron (ZOFRAN) 4 MG

## 2024-04-09 NOTE — PROGRESS NOTES
Begin Time 1355   End Time  1425   Total Time Calculated 30 min   Crisis   Type Trauma   Assessment/Intervention/Outcome   Assessment Coping   Intervention Active listening;Explored/Affirmed feelings, thoughts, concerns  (Contacted friend)   Outcome Engaged in conversation;Expressed feelings, needs, and concerns;Expressed Gratitude;Receptive

## 2024-04-09 NOTE — ED PROVIDER NOTES
[facility] ED     Emergency Department     Faculty Attestation    I performed a history and physical examination of the patient and discussed management with the resident. I reviewed the resident’s note and agree with the documented findings and plan of care. Any areas of disagreement are noted on the chart. I was personally present for the key portions of any procedures. I have documented in the chart those procedures where I was not present during the key portions. I have reviewed the emergency nurses triage note. I agree with the chief complaint, past medical history, past surgical history, allergies, medications, social and family history as documented unless otherwise noted below. For Physician Assistant/ Nurse Practitioner cases/documentation I have personally evaluated this patient and have completed at least one if not all key elements of the E/M (history, physical exam, and MDM). Additional findings are as noted.    2:13 PM EDT    Patient brought in by EMS as a trauma priority.  Patient was reportedly at a restaurant when she was walking and missed the curb and fell hitting her head.  Patient is not on any anticoagulants.  On arrival here, patient is alert and oriented with a GCS of 15.  Airway is intact and breath sounds are equal bilaterally.  Will await imaging and trauma surgery recommendations.    EKG Interpretation    Interpreted by emergency department physician    Rhythm: normal sinus   Rate: normal  Axis: normal  Ectopy: none  Conduction: normal  ST Segments: nonspecific changes  T Waves: normal  Q Waves: none    Clinical Impression: non-specific EKG    MD Cleopatra Ingram MD  Attending Emergency  Physician         
Faculty Sign-Out Attestation  Handoff taken on the following patient from prior Attending Physician: Geoffrey    Note Started: 4:07 PM EDT    I was available and discussed any additional care issues that arose and coordinated the management plans with the resident(s) caring for the patient during my duty period. Any areas of disagreement with resident’s documentation of care or procedures are noted on the chart. I was personally present for the key portions of any/all procedures during my duty period. I have documented in the chart those procedures where I was not present during the key portions.      XR HAND RIGHT (MIN 3 VIEWS)   Final Result   No definite fracture.         CT HEAD WO CONTRAST   Final Result   Mild central and cortical cerebral atrophy.      Moderate chronic deep white matter ischemic changes      No acute intracranial abnormalities are noted.      Right frontal scalp hematoma         CT CERVICAL SPINE WO CONTRAST   Final Result   1.  No acute abnormality of the cervical spine.      2.  Left thyroid nodule.  Nonemergent ultrasound is recommended      RECOMMENDATIONS:   Nonemergent thyroid ultrasound         XR CHEST PORTABLE   Preliminary Result   No acute process.         XR PELVIS (1-2 VIEWS)   Final Result   No acute bony abnormalities are noted               Clyde Wolff MD  Attending Physician        Clyde Wolff MD  04/09/24 [id number]    
General:         Right eye: No discharge.         Left eye: No discharge.      Extraocular Movements: Extraocular movements intact.      Conjunctiva/sclera: Conjunctivae normal.      Pupils: Pupils are equal, round, and reactive to light.   Neck:      Comments: C-collar in place.  No midline C-spine tenderness palpation  Cardiovascular:      Rate and Rhythm: Normal rate and regular rhythm.      Pulses: Normal pulses.      Heart sounds: Normal heart sounds.   Pulmonary:      Effort: Pulmonary effort is normal. No respiratory distress.      Breath sounds: Normal breath sounds. No wheezing.   Abdominal:      General: Abdomen is flat.      Tenderness: There is no abdominal tenderness. There is no guarding or rebound.   Musculoskeletal:         General: Swelling (Right forehead) present. No deformity.      Cervical back: Normal range of motion and neck supple. No tenderness.      Right lower leg: No edema.      Left lower leg: No edema.      Comments: No midline T/L-spine tenderness palpation.   Skin:     General: Skin is warm and dry.      Findings: No erythema or rash.      Comments: Superficial abrasion right forehead   Neurological:      Mental Status: She is alert and oriented to person, place, and time. Mental status is at baseline.      Sensory: No sensory deficit.      Motor: No weakness.      Coordination: Coordination normal.      Gait: Gait normal.      Comments: Moving all 4 extremities with good coordination.  Sensation intact all 4 extremities.  No facial droop or slurred speech.  Alert and oriented x 3 with a GCS of 15   Psychiatric:         Mood and Affect: Mood normal.         Behavior: Behavior normal.         Thought Content: Thought content normal.         Judgment: Judgment normal.           DDX/DIAGNOSTIC RESULTS / EMERGENCY DEPARTMENT COURSE / [facility]     Medical Decision Making   Trauma Robert is a 89 y.o. female with reported history of cardiac arrhythmia and MS who presents emergency department

## 2024-04-09 NOTE — DISCHARGE INSTRUCTIONS
04/09/24    A CT scan was performed during your stay in the hospital.  A radiologist reviewed these images and has encountered a finding that may be important to you and your primary care provider.      The findings of your CT scan that may require further testing include:  Left thyroid nodule. Nonemergent ultrasound is recommended     Missy Pang DO        Seen in the emergency department as a trauma priority after a mechanical fall.  Your trauma CT scans and x-rays are negative for acute pathology/fracture.  Please follow-up with your primary care provider primary care within the next 1 to 2 days.  Please take Tylenol Motrin for aches/pains.  Please return to the emergency department if you develop changes in vision, headaches, vomiting, numbness and tingling in any of your 4 extremities, episodes of passing out, or any other concerning symptoms

## 2024-04-09 NOTE — ED NOTES
C-Collar placed on patient. Dr. Ahumada holding C Spine.   Xray at [facility]

## 2024-04-09 NOTE — ED TRIAGE NOTES
Pt presents to ED from [facility] EMS for a mechanical fall no LOC . Pt reports not being on blood thinners, states she was at a restaurant and tripped over a curb while walking to her vehicle. Pt has a hx of MS.  Pt denies n/v/d, LOC, CP, SOB, fever, chills, change in bowel/bladder function, loss of appetite, or any other sx.      Pt is A&OX4, placed on full monitor,IV established, changed into gown and given warm blankets. Labs drawn, labeled, and sent to lab. Pt vitals show HTN but otherwise WNL. Trauma team at bedside, C-collar placed. CTLS maintained throughout movement. Pt has abrasion and ecchymosis to her forehead, R side.

## 2024-04-11 LAB
EKG ATRIAL RATE: 79 BPM
EKG P AXIS: 50 DEGREES
EKG P-R INTERVAL: 136 MS
EKG Q-T INTERVAL: 400 MS
EKG QRS DURATION: 84 MS
EKG QTC CALCULATION (BAZETT): 458 MS
EKG R AXIS: 34 DEGREES
EKG T AXIS: 50 DEGREES
EKG VENTRICULAR RATE: 79 BPM

## 2024-04-12 LAB
EKG ATRIAL RATE: 95 BPM
EKG P AXIS: 37 DEGREES
EKG P-R INTERVAL: 140 MS
EKG Q-T INTERVAL: 382 MS
EKG QRS DURATION: 76 MS
EKG QTC CALCULATION (BAZETT): 480 MS
EKG R AXIS: 15 DEGREES
EKG T AXIS: 55 DEGREES
EKG VENTRICULAR RATE: 95 BPM

## 2024-04-13 ENCOUNTER — HOSPITAL ENCOUNTER (EMERGENCY)
Age: 79
Discharge: HOME | End: 2024-04-14
Attending: STUDENT IN AN ORGANIZED HEALTH CARE EDUCATION/TRAINING PROGRAM
Payer: MEDICARE

## 2024-04-13 VITALS
OXYGEN SATURATION: 95 % | HEIGHT: 59 IN | RESPIRATION RATE: 20 BRPM | SYSTOLIC BLOOD PRESSURE: 187 MMHG | WEIGHT: 135 LBS | TEMPERATURE: 98 F | BODY MASS INDEX: 27.21 KG/M2 | DIASTOLIC BLOOD PRESSURE: 92 MMHG | HEART RATE: 68 BPM

## 2024-04-13 DIAGNOSIS — R03.0 ELEVATED BLOOD PRESSURE READING: Primary | ICD-10-CM

## 2024-04-13 LAB
ANION GAP SERPL CALCULATED.3IONS-SCNC: 10 MMOL/L (ref 9–17)
BASOPHILS # BLD: 0 K/UL (ref 0–0.2)
BASOPHILS NFR BLD: 1 % (ref 0–2)
BUN SERPL-MCNC: 24 MG/DL (ref 8–23)
CALCIUM SERPL-MCNC: 9.8 MG/DL (ref 8.6–10.4)
CHLORIDE SERPL-SCNC: 107 MMOL/L (ref 98–107)
CO2 SERPL-SCNC: 25 MMOL/L (ref 20–31)
CREAT SERPL-MCNC: 0.9 MG/DL (ref 0.5–0.9)
EOSINOPHIL # BLD: 0.2 K/UL (ref 0–0.4)
EOSINOPHILS RELATIVE PERCENT: 4 % (ref 1–4)
ERYTHROCYTE [DISTWIDTH] IN BLOOD BY AUTOMATED COUNT: 15.8 % (ref 12.5–15.4)
GFR SERPL CREATININE-BSD FRML MDRD: 65 ML/MIN/1.73M2
GLUCOSE SERPL-MCNC: 106 MG/DL (ref 70–99)
HCT VFR BLD AUTO: 39.1 % (ref 36–46)
HGB BLD-MCNC: 13.2 G/DL (ref 12–16)
LYMPHOCYTES NFR BLD: 1.6 K/UL (ref 1–4.8)
LYMPHOCYTES RELATIVE PERCENT: 35 % (ref 24–44)
MCH RBC QN AUTO: 32.1 PG (ref 26–34)
MCHC RBC AUTO-ENTMCNC: 33.7 G/DL (ref 31–37)
MCV RBC AUTO: 95.3 FL (ref 80–100)
MONOCYTES NFR BLD: 0.5 K/UL (ref 0.1–1.2)
MONOCYTES NFR BLD: 11 % (ref 2–11)
NEUTROPHILS NFR BLD: 49 % (ref 36–66)
NEUTS SEG NFR BLD: 2.3 K/UL (ref 1.8–7.7)
PLATELET # BLD AUTO: 253 K/UL (ref 140–450)
PMV BLD AUTO: 7.9 FL (ref 6–12)
POTASSIUM SERPL-SCNC: 4.2 MMOL/L (ref 3.7–5.3)
RBC # BLD AUTO: 4.1 M/UL (ref 4–5.2)
SODIUM SERPL-SCNC: 142 MMOL/L (ref 135–144)
WBC OTHER # BLD: 4.6 K/UL (ref 3.5–11)

## 2024-04-13 PROCEDURE — 36415 COLL VENOUS BLD VENIPUNCTURE: CPT

## 2024-04-13 PROCEDURE — 93005 ELECTROCARDIOGRAM TRACING: CPT | Performed by: STUDENT IN AN ORGANIZED HEALTH CARE EDUCATION/TRAINING PROGRAM

## 2024-04-13 PROCEDURE — 85025 COMPLETE CBC W/AUTO DIFF WBC: CPT

## 2024-04-13 PROCEDURE — 80048 BASIC METABOLIC PNL TOTAL CA: CPT

## 2024-04-13 PROCEDURE — 84484 ASSAY OF TROPONIN QUANT: CPT

## 2024-04-13 PROCEDURE — 99284 EMERGENCY DEPT VISIT MOD MDM: CPT | Performed by: STUDENT IN AN ORGANIZED HEALTH CARE EDUCATION/TRAINING PROGRAM

## 2024-04-13 ASSESSMENT — PAIN - FUNCTIONAL ASSESSMENT
PAIN_FUNCTIONAL_ASSESSMENT: NONE - DENIES PAIN
PAIN_FUNCTIONAL_ASSESSMENT: NONE - DENIES PAIN

## 2024-04-14 LAB — TROPONIN I SERPL HS-MCNC: 10 NG/L (ref 0–14)

## 2024-04-14 NOTE — DISCHARGE INSTRUCTIONS
Continue taking your medication as it is prescribed and follow-up with your cardiologist on Monday.  If you develop heavy chest pain, nausea, sweating, or any other concerns please return to the emergency department immediately.

## 2024-04-14 NOTE — ED PROVIDER NOTES
[facility] EMERGENCY DEPARTMENT  EMERGENCY DEPARTMENT ENCOUNTER      Pt Name: Mary López  MRN: [id number]  Birthdate 1945  Date of evaluation: 4/13/2024  Provider: Dat Goins DO    CHIEF COMPLAINT       Chief Complaint   Patient presents with    Hypertension     Pt reports taking bp at home -200s, reports taking carvediolol and losartan      Shortness of Breath     Started one week ago         HISTORY OF PRESENT ILLNESS   (Location/Symptom, Timing/Onset, Context/Setting, Quality, Duration, Modifying Factors, Severity)  Note limiting factors.   Mary López is a 79 y.o. female who presents to the emergency department with elevated blood pressure.  Patient states that for the past week she has been having issues with high blood pressure at home despite taking her losartan and Coreg.  She notes that she had a fall on Tuesday of last week with head injury requiring her to go to [facility].  She notes that there was not any intracranial abnormalities but states that since then her blood pressure has been very high.  She states that her systolic blood pressures tonight have been in the 220s and her diastolics have been in the 110s.  She is uncertain if there is something wrong with her blood pressure cuff though since her blood pressure currently is 187/90.  She notes that she has been having some shortness of breath for the past week but denies any new chest pain, nausea, diaphoresis, severe headache, change in vision, numbness or tingling or weakness to her extremities, vertigo, double vision or loss of vision.    HPI    Nursing Notes were reviewed.    REVIEW OF SYSTEMS    (2-9 systems for level 4, 10 or more for level 5)     Review of Systems   Constitutional:  Negative for chills and fever.   HENT:  Negative for congestion, facial swelling and sore throat.    Eyes:  Negative for visual disturbance.   Respiratory:  Positive for shortness of breath. Negative for cough and

## 2024-04-16 LAB
EKG ATRIAL RATE: 72 BPM
EKG P AXIS: 35 DEGREES
EKG P-R INTERVAL: 156 MS
EKG Q-T INTERVAL: 390 MS
EKG QRS DURATION: 74 MS
EKG QTC CALCULATION (BAZETT): 427 MS
EKG R AXIS: 4 DEGREES
EKG T AXIS: 44 DEGREES
EKG VENTRICULAR RATE: 72 BPM

## 2024-05-13 ENCOUNTER — TRANSCRIBE ORDERS (OUTPATIENT)
Dept: ADMINISTRATIVE | Age: 79
End: 2024-05-13

## 2024-05-13 DIAGNOSIS — R06.02 SHORTNESS OF BREATH: ICD-10-CM

## 2024-05-13 DIAGNOSIS — R42 DIZZINESS AND GIDDINESS: ICD-10-CM

## 2024-05-13 DIAGNOSIS — I10 ESSENTIAL HYPERTENSION, MALIGNANT: Primary | ICD-10-CM

## 2024-06-03 ENCOUNTER — HOSPITAL ENCOUNTER (OUTPATIENT)
Age: 79
Discharge: HOME | End: 2024-06-05
Payer: MEDICARE

## 2024-06-03 VITALS — WEIGHT: 135 LBS | BODY MASS INDEX: 27.21 KG/M2 | HEIGHT: 59 IN

## 2024-06-03 DIAGNOSIS — R06.02 SHORTNESS OF BREATH: ICD-10-CM

## 2024-06-03 DIAGNOSIS — R42 DIZZINESS AND GIDDINESS: ICD-10-CM

## 2024-06-03 DIAGNOSIS — I10 ESSENTIAL HYPERTENSION, MALIGNANT: ICD-10-CM

## 2024-06-03 PROCEDURE — 93306 TTE W/DOPPLER COMPLETE: CPT

## 2024-06-03 PROCEDURE — 93306 TTE W/DOPPLER COMPLETE: CPT | Performed by: INTERNAL MEDICINE

## 2024-06-04 LAB
ECHO AO ROOT DIAM: 3.2 CM
ECHO AO ROOT DIAM: 3.2 CM
ECHO AO ROOT INDEX: 2.05 CM/M2
ECHO AR MAX VEL PISA: 3.2 M/S
ECHO AV AREA PEAK VELOCITY: 2.8 CM2
ECHO AV AREA VTI: 27.8 CM2
ECHO AV AREA VTI: 3 CM2
ECHO AV AREA/BSA VTI: 17.8 CM2/M2
ECHO AV MEAN GRADIENT: 3 MMHG
ECHO AV MEAN GRADIENT: 3 MMHG
ECHO AV MEAN VELOCITY: 0.9 M/S
ECHO AV PEAK GRADIENT: 5 MMHG
ECHO AV PEAK GRADIENT: 5 MMHG
ECHO AV PEAK VELOCITY: 1.2 M/S
ECHO AV PEAK VELOCITY: 115 M/S
ECHO AV REGURGITANT PHT: 485 MS
ECHO AV VTI: 27.8 CM
ECHO BSA: 1.6 M2
ECHO EST RA PRESSURE: 3 MMHG
ECHO IVC EXP: 1.7 CM
ECHO IVC EXP: 1.7 CM
ECHO IVC INSP: 0.8 CM
ECHO IVC INSP: 0.8 CM
ECHO LA AREA 2C: 16.4 CM2
ECHO LA AREA 4C: 18.6 CM2
ECHO LA DIAMETER INDEX: 3.14 CM/M2
ECHO LA DIAMETER: 4.9 CM
ECHO LA DIAMETER: 4.9 CM
ECHO LA MAJOR AXIS: 5.2 CM
ECHO LA MINOR AXIS: 5.2 CM
ECHO LA TO AORTIC ROOT RATIO: 1.53
ECHO LA VOL BP: 48 ML (ref 22–52)
ECHO LA VOL MOD A2C: 43 ML (ref 22–52)
ECHO LA VOL MOD A4C: 54 ML (ref 22–52)
ECHO LV E' LATERAL VELOCITY: 5 CM/S
ECHO LV E' LATERAL VELOCITY: 7 CM/S
ECHO LV E' SEPTAL VELOCITY: 3 CM/S
ECHO LV E' SEPTAL VELOCITY: 3 CM/S
ECHO LV FRACTIONAL SHORTENING: 14 % (ref 28–44)
ECHO LV INTERNAL DIMENSION DIASTOLE INDEX: 2.31 CM/M2
ECHO LV INTERNAL DIMENSION DIASTOLIC: 3.6 CM (ref 3.9–5.3)
ECHO LV INTERNAL DIMENSION DIASTOLIC: 3.6 CM (ref 3.9–5.3)
ECHO LV INTERNAL DIMENSION SYSTOLIC INDEX: 1.99 CM/M2
ECHO LV INTERNAL DIMENSION SYSTOLIC: 3.1 CM
ECHO LV INTERNAL DIMENSION SYSTOLIC: 3.1 CM
ECHO LV IVSD: 1.2 CM (ref 0.6–0.9)
ECHO LV IVSD: 1.2 CM (ref 0.6–0.9)
ECHO LV MASS 2D: 124.1 G (ref 67–162)
ECHO LV MASS INDEX 2D: 79.6 G/M2 (ref 43–95)
ECHO LV POSTERIOR WALL DIASTOLIC: 1 CM (ref 0.6–0.9)
ECHO LV POSTERIOR WALL DIASTOLIC: 1 CM (ref 0.6–0.9)
ECHO LV RELATIVE WALL THICKNESS RATIO: 0.56
ECHO LVOT AREA: 3.1 CM2
ECHO LVOT AREA: 3.1 CM2
ECHO LVOT DIAM: 2 CM
ECHO LVOT DIAM: 2 CM
ECHO LVOT MEAN GRADIENT: 2 MMHG
ECHO LVOT PEAK GRADIENT: 4 MMHG
ECHO LVOT PEAK VELOCITY: 1 M/S
ECHO LVOT STROKE VOLUME INDEX: 53.1 ML/M2
ECHO LVOT SV: 82.9 ML
ECHO LVOT SV: 83 ML
ECHO LVOT VTI: 26.4 CM
ECHO LVOT VTI: 26.4 CM
ECHO MV A VELOCITY: 0.75 M/S
ECHO MV AREA PHT: 3.4 CM2
ECHO MV E DECELERATION TIME (DT): 218 MS
ECHO MV E VELOCITY: 0.68 M/S
ECHO RV BASAL DIMENSION: 3.1 CM
ECHO RV FREE WALL PEAK S': 10 CM/S
ECHO RV FREE WALL PEAK S': 10 CM/S
ECHO RV INTERNAL DIMENSION: 3.1 CM
ECHO TV REGURGITANT MAX VELOCITY: 2.2 M/S
ECHO TV REGURGITANT PEAK GRADIENT: 19 MMHG
ECHO TV REGURGITANT PEAK GRADIENT: 19 MMHG

## 2024-06-13 ENCOUNTER — HOSPITAL ENCOUNTER (OUTPATIENT)
Dept: GENERAL RADIOLOGY | Age: 79
Discharge: HOME | End: 2024-06-15
Payer: MEDICARE

## 2024-06-13 ENCOUNTER — HOSPITAL ENCOUNTER (OUTPATIENT)
Age: 79
Discharge: HOME | End: 2024-06-15
Payer: MEDICARE

## 2024-06-13 DIAGNOSIS — R29.6 FALLS FREQUENTLY: ICD-10-CM

## 2024-06-13 DIAGNOSIS — M51.36 DEGENERATION OF LUMBAR INTERVERTEBRAL DISC: ICD-10-CM

## 2024-06-13 PROCEDURE — 72100 X-RAY EXAM L-S SPINE 2/3 VWS: CPT

## 2024-06-19 ENCOUNTER — HOSPITAL ENCOUNTER (OUTPATIENT)
Dept: PHYSICAL THERAPY | Facility: CLINIC | Age: 79
Setting detail: THERAPIES SERIES
Discharge: HOME | End: 2024-06-19
Payer: MEDICARE

## 2024-06-19 PROCEDURE — 97162 PT EVAL MOD COMPLEX 30 MIN: CPT

## 2024-06-19 NOTE — CONSULTS
[] [facility]  Outpatient Rehabilitation &  Therapy  [address]  [phone]  F: [phone] [] [facility]  Outpatient Rehabilitation &  Therapy  [address]   [address]  P: [phone]  F: [phone] [x] [facility]  Outpatient Rehabilitation &  Therapy  [address]  P: [phone]  F: [phone] [] [facility]  Outpatient Rehabilitation &  Therapy  [address]  P: [phone]  F: [phone] [] [facility]  Outpatient Rehabilitation &  Therapy  [address]   P: [phone]  F: [phone]  [] [facility]  Outpatient Rehabilitation &  Therapy  [address]   P: [phone]  F: [phone] [] [facility]  Outpatient Rehabilitation &  Therapy  [address]  P: [phone]  F: [phone] [] [facility]  Outpatient Rehabilitation &  Therapy  [address]  P: [phone]  F: [phone] [] [facility]  Outpatient Rehabilitation &  Therapy  [address]  P: [phone]  F: [phone]      Physical Therapy General Evaluation    Date:  2024  Patient: Mary López  : 1945  MRN: [id number]  Physician: Tiera Hernandez     Insurance: Medicare VISITS BASED ON MEDICAL NECESSITY  Medical Diagnosis: Recurrent Falls, Cervical stenosis of spinal canal, DDD lumbar    Rehab Codes: R26.2., M54.50 M54.2  Onset Date: 24                                  Next 's appt: unknown    Subjective:   CC: Ms. López, a 79-year-old female, was referred with the diagnosis of recurrent falls, cervical stenosis of spinal canal, and DDD lumbar.  Patient reports an onset of lumbar pain and cervical pain about 3 weeks after a fall that occurred on 24. She states she stepped off a curb, fell, and landed on the street striking her head.

## 2024-06-21 ENCOUNTER — APPOINTMENT (OUTPATIENT)
Dept: PHYSICAL THERAPY | Facility: CLINIC | Age: 79
End: 2024-06-21
Payer: MEDICARE

## 2024-06-24 ENCOUNTER — HOSPITAL ENCOUNTER (OUTPATIENT)
Dept: PHYSICAL THERAPY | Facility: CLINIC | Age: 79
Setting detail: THERAPIES SERIES
Discharge: HOME | End: 2024-06-24
Payer: MEDICARE

## 2024-06-24 NOTE — FLOWSHEET NOTE
[] [facility]  Outpatient Rehabilitation &  Therapy  [address]  [phone]  [phone] [] [facility]  Outpatient Rehabilitation &  Therapy  [address]  P: [phone]  F: [phone] [x] [facility]  Outpatient Rehabilitation &  Therapy  [address]  P: [phone]  F: [phone] [] [facility]  Outpatient Rehabilitation &  Therapy  [address]  [phone]  [phone] [] [facility]  Outpatient Rehabilitation &  Therapy  [address]   P: [phone]  F: [phone]  [] [facility]  Outpatient Rehabilitation &  Therapy  [address]  P: [phone]  F: [phone] [] [facility]  Outpatient Rehabilitation &  Therapy  [address]  P: [phone]  F: [phone] [] [facility]  Outpatient Rehabilitation &  Therapy  [address]  P: [phone]  F: [phone] [] [facility]  Outpatient Rehabilitation &  Therapy  [address]  P: [phone]  F: [phone]  [] [facility] Outpatient Rehabilitation &  Therapy  [address]  P: [phone]  F: [phone]     Therapy Cancel/No Show note    Date: 2024  Patient: Mary López  : 1945  MRN: [id number]    Cancels/No Shows to date:     For today's appointment patient:    [x]  Cancelled    [] Rescheduled appointment    [] No-show     Reason given by patient:    [x]  Patient ill    []  Conflicting appointment    [] No transportation      [] Conflict with work    [] No reason given    [] Weather related    [] COVID-19    [] Other:      Comments:        [x] Next appointment was confirmed    Electronically signed by: Malik Lerma, PTA

## 2024-06-25 ENCOUNTER — APPOINTMENT (OUTPATIENT)
Dept: PHYSICAL THERAPY | Facility: CLINIC | Age: 79
End: 2024-06-25
Payer: MEDICARE

## 2024-06-27 ENCOUNTER — APPOINTMENT (OUTPATIENT)
Dept: PHYSICAL THERAPY | Facility: CLINIC | Age: 79
End: 2024-06-27
Payer: MEDICARE

## 2024-07-01 ENCOUNTER — HOSPITAL ENCOUNTER (OUTPATIENT)
Dept: PHYSICAL THERAPY | Facility: CLINIC | Age: 79
Setting detail: THERAPIES SERIES
Discharge: HOME | End: 2024-07-01
Payer: MEDICARE

## 2024-07-01 PROCEDURE — 97110 THERAPEUTIC EXERCISES: CPT

## 2024-07-01 PROCEDURE — 97112 NEUROMUSCULAR REEDUCATION: CPT

## 2024-07-01 NOTE — FLOWSHEET NOTE
[] [facility]  Outpatient Rehabilitation &  Therapy  [address]  [phone]  [phone] [] [facility]  Outpatient Rehabilitation &  Therapy  [address]  P: [phone]  F: [phone] [x] [facility]  Outpatient Rehabilitation &  Therapy  [address]  P: [phone]  F: [phone] [] [facility]  Outpatient Rehabilitation &  Therapy  [address]  [phone]  [phone] [] [facility]  Outpatient Rehabilitation &  Therapy  [address]   P: [phone]  F: [phone]  [] [facility]  Outpatient Rehabilitation &  Therapy  [address]  P: [phone]  F: [phone] [] [facility]  Outpatient Rehabilitation &  Therapy  [address]  P: [phone]  F: [phone] [] [facility]  Outpatient Rehabilitation &  Therapy  [address]  P: [phone]  F: [phone] [] [facility]  Outpatient Rehabilitation &  Therapy  [address]  P: [phone]  F: [phone]  [] [facility] Outpatient Rehabilitation &  Therapy  [address]  P: [phone]  F: [phone]     Physical Therapy Daily Treatment Note    Date:  2024  Patient Name:  Mary López    :  1945  MRN: [id number]  Physician: Tiera Hernandez                            Insurance: Medicare VISITS BASED ON MEDICAL NECESSITY  Medical Diagnosis: Recurrent Falls, Cervical stenosis of spinal canal, DDD lumbar                       Rehab Codes: R26.2., M54.50 M54.2  Visit# / total visits: ; Progress note for Medicare patient due at visit 10     Cancels/No Shows:     Subjective:    Pain:  [x] Yes  [] No Location: lumbar  Pain Rating: (0-10 scale) 1/10  Pain altered Tx:  [] No  [] Yes  Action:  Comments: Patient states her BP

## 2024-07-03 ENCOUNTER — HOSPITAL ENCOUNTER (OUTPATIENT)
Dept: PHYSICAL THERAPY | Facility: CLINIC | Age: 79
Setting detail: THERAPIES SERIES
Discharge: HOME | End: 2024-07-03
Payer: MEDICARE

## 2024-07-03 PROCEDURE — 97110 THERAPEUTIC EXERCISES: CPT

## 2024-07-03 PROCEDURE — 97112 NEUROMUSCULAR REEDUCATION: CPT

## 2024-07-03 NOTE — FLOWSHEET NOTE
[] [facility]  Outpatient Rehabilitation &  Therapy  [address]  [phone]  [phone] [] [facility]  Outpatient Rehabilitation &  Therapy  [address]  P: [phone]  F: [phone] [x] [facility]  Outpatient Rehabilitation &  Therapy  [address]  P: [phone]  F: [phone] [] [facility]  Outpatient Rehabilitation &  Therapy  [address]  [phone]  [phone] [] [facility]  Outpatient Rehabilitation &  Therapy  [address]   P: [phone]  F: [phone]  [] [facility]  Outpatient Rehabilitation &  Therapy  [address]  P: [phone]  F: [phone] [] [facility]  Outpatient Rehabilitation &  Therapy  [address]  P: [phone]  F: [phone] [] [facility]  Outpatient Rehabilitation &  Therapy  [address]  P: [phone]  F: [phone] [] [facility]  Outpatient Rehabilitation &  Therapy  [address]  P: [phone]  F: [phone]  [] [facility] Outpatient Rehabilitation &  Therapy  [address]  P: [phone]  F: [phone]     Physical Therapy Daily Treatment Note    Date:  7/3/2024  Patient Name:  Mary López    :  1945  MRN: [id number]  Physician: Tiera Hernandez                            Insurance: Medicare VISITS BASED ON MEDICAL NECESSITY  Medical Diagnosis: Recurrent Falls, Cervical stenosis of spinal canal, DDD lumbar                       Rehab Codes: R26.2., M54.50 M54.2  Visit# / total visits: 3/16; Progress note for Medicare patient due at visit 10     Cancels/No Shows:     Subjective:    Pain:  [] Yes  [x] No Location: lumbar  Pain Rating: (0-10 scale) 1-2/10  Pain altered Tx:  [x] No  [] Yes  Action:  Comments: Pt arrives today

## 2024-07-10 ENCOUNTER — HOSPITAL ENCOUNTER (OUTPATIENT)
Dept: PHYSICAL THERAPY | Facility: CLINIC | Age: 79
Setting detail: THERAPIES SERIES
Discharge: HOME | End: 2024-07-10
Payer: MEDICARE

## 2024-07-10 PROCEDURE — 97110 THERAPEUTIC EXERCISES: CPT

## 2024-07-10 PROCEDURE — 97112 NEUROMUSCULAR REEDUCATION: CPT

## 2024-07-10 NOTE — FLOWSHEET NOTE
1 x daily - 2 sets - 10 reps  - Supine Transversus Abdominis Bracing - Hands on Stomach  - 1 x daily - 10 reps - 5 seconds hold  - Clamshell  - 1 x daily - 3 sets - 10 reps  - Sidelying Hip Abduction  - 1 x daily - 3 sets - 10 reps  - Hooklying Single Knee to Chest Stretch  - 1 x daily - 7 x weekly - 3 sets - 30 hold  - Supine Gluteal Sets  - 1 x daily - 7 x weekly - 2 sets - 10 reps - 10 hold  - Seated Long Arc Quad  - 1 x daily - 7 x weekly - 3 sets - 10 reps  - Sit to Stand with Counter Support  - 1 x daily - 7 x weekly - 3 sets - 10 reps      Plan: [x] Continue current frequency toward long and short term goals.    [x] Specific Instructions for subsequent treatments: see above      Time In: 2:00 pm            Time Out: 3:00 pm    Electronically signed by:  Connie Pereyra, FELIX

## 2024-07-11 ENCOUNTER — HOSPITAL ENCOUNTER (OUTPATIENT)
Dept: PHYSICAL THERAPY | Facility: CLINIC | Age: 79
Setting detail: THERAPIES SERIES
Discharge: HOME | End: 2024-07-11
Payer: MEDICARE

## 2024-07-11 PROCEDURE — 97116 GAIT TRAINING THERAPY: CPT

## 2024-07-11 PROCEDURE — 97530 THERAPEUTIC ACTIVITIES: CPT

## 2024-07-11 NOTE — CARE COORDINATION
[] [facility]  Outpatient Rehabilitation &  Therapy  [address]  [phone]  [phone] [] [facility]  Outpatient Rehabilitation &  Therapy  [address]  P: [phone]  F: [phone] [x] [facility]  Outpatient Rehabilitation &  Therapy  [address]  P: [phone]  F: [phone] [] [facility]  Outpatient Rehabilitation &  Therapy  [address]  [phone]  [phone] [] [facility]  Outpatient Rehabilitation &  Therapy  [address]   P: [phone]  F: [phone]  [] [facility]  Outpatient Rehabilitation &  Therapy  [address]  P: [phone]  F: [phone] [] [facility]  Outpatient Rehabilitation &  Therapy  [address]  P: [phone]  F: [phone] [] [facility]  Outpatient Rehabilitation &  Therapy  [address]  P: [phone]  F: [phone] [] [facility]  Outpatient Rehabilitation &  Therapy  [address]  P: [phone]  F: [phone]  [] [facility] Outpatient Rehabilitation &  Therapy  [address]  P: [phone]  F: [phone]     THERAPY RESPONSIBILITY OF CARE TRANSFER FORM       PATIENT NAME: Mary López  MRN: [id number]   : 1945      TRANSFERRING FACILITY:    [x] [place]   [] [facility]   [] [facility]   [] [facility]   [] [facility] [] [place]   [] [facility]  [] [facility]  [] [facility]   [] [place]  [] [place]   [] Other:          ACCEPTING FACILITY  [] [place]   [] [facility]   [] [facility]   [] [facility]   [] [facility] [] [place]   [] [facility]  [] [facility]  [x]  [facility]   [] [place]  [] [place]   [] Other:         REASON FOR TRANSFER: Vestibular

## 2024-07-11 NOTE — FLOWSHEET NOTE
transfers.     [] No change.     [] Other:  [x] Patient would continue to benefit from skilled physical therapy services in order to:  reduce pain for improved ADL and improve LE strength/balance to reduce risk af fall.     Problem list:   [x] ? Pain                       [x] ? ROM                      [x] ? Strength                 [x] ? Function:   [x] ? Balance  [] Edema  [] Postural Deviations  [x] Gait Deviations  [] Other     STG: (to be met in 10 treatments)  ? Pain: lumbar to 5/10 at worst to improve standing tolerance  ? ROM: lumbar extension to no > 25% limitation to improve standing and walking tolerance  ? ROM: left cervical rotation to to > 25% limit to allow patient to drive with no difficulty  ? Strength: Bilateral LE to at least 4/5 to improve stability with gait  Patient to be independent with home exercise program as demonstrated by performance with correct form without cues.  Demonstrate Knowledge of fall prevention  LTG: (to be met in 18 treatments)  JACKY 30% or less impaired  NPDI 20% or less impaired  Tinetti 24 to reduce risk of fall     Patient goals: \"less pain, I would like an improvement with balance (right now it is bad)\"    Pt. Education:  [x] Yes  [] No  [] Reviewed Prior HEP/Ed  Method of Education: [x] Verbal  [] Demo  [x] Written  Comprehension of Education:  [] Verbalizes understanding.  [] Demonstrates understanding.  [x] Needs review.  [] Demonstrates/verbalizes HEP/Ed previously given.     Access Code: [email or web address]  URL: [email or web address]  Date: 07/10/2024  Prepared by: Connie Pereyra    Exercises  - Seated Cervical Rotation AROM  - 1 x daily - 5-10 reps  - Lower Trunk Rotations  - 1 x daily - 2 sets - 10 reps  - Supine Transversus Abdominis Bracing - Hands on Stomach  - 1 x daily - 10 reps - 5 seconds hold  - Clamshell  - 1 x daily - 3 sets - 10 reps  - Sidelying Hip Abduction  - 1 x daily - 3 sets - 10 reps  - Hooklying Single Knee to Chest Stretch  - 1 x daily - 7 x

## 2024-07-16 ENCOUNTER — HOSPITAL ENCOUNTER (OUTPATIENT)
Dept: PHYSICAL THERAPY | Facility: CLINIC | Age: 79
Setting detail: THERAPIES SERIES
Discharge: HOME | End: 2024-07-16
Payer: MEDICARE

## 2024-07-16 PROCEDURE — 97110 THERAPEUTIC EXERCISES: CPT

## 2024-07-16 NOTE — FLOWSHEET NOTE
[] [facility]  Outpatient Rehabilitation &  Therapy  [address]  [phone]  [phone] [] [facility]  Outpatient Rehabilitation &  Therapy  [address]  P: [phone]  F: [phone] [x] [facility]  Outpatient Rehabilitation &  Therapy  [address]  P: [phone]  F: [phone] [] [facility]  Outpatient Rehabilitation &  Therapy  [address]  [phone]  [phone] [] [facility]  Outpatient Rehabilitation &  Therapy  [address]   P: [phone]  F: [phone]  [] [facility]  Outpatient Rehabilitation &  Therapy  [address]  P: [phone]  F: [phone] [] [facility]  Outpatient Rehabilitation &  Therapy  [address]  P: [phone]  F: [phone] [] [facility]  Outpatient Rehabilitation &  Therapy  [address]  P: [phone]  F: [phone] [] [facility]  Outpatient Rehabilitation &  Therapy  [address]  P: [phone]  F: [phone]  [] [facility] Outpatient Rehabilitation &  Therapy  [address]  P: [phone]  F: [phone]     Physical Therapy Daily Treatment Note    Date:  2024  Patient Name:  Mary López    :  1945  MRN: [id number]  Physician: Tiera Hernandez                            Insurance: Medicare VISITS BASED ON MEDICAL NECESSITY  Medical Diagnosis: Recurrent Falls, Cervical stenosis of spinal canal, DDD lumbar                       Rehab Codes: R26.2., M54.50 M54.2  Visit# / total visits: ; Progress note for Medicare patient due at visit 10     Cancels/No Shows:     Subjective:    Pain:  [x] Yes  [] No Location: lumbar  Pain Rating: (0-10 scale) 2/10  Pain altered Tx:  [x] No  [] Yes  Action:  Comments: pt arrived with no

## 2024-07-18 ENCOUNTER — HOSPITAL ENCOUNTER (OUTPATIENT)
Dept: PHYSICAL THERAPY | Facility: CLINIC | Age: 79
Setting detail: THERAPIES SERIES
Discharge: HOME | End: 2024-07-18
Payer: MEDICARE

## 2024-07-18 PROCEDURE — 97110 THERAPEUTIC EXERCISES: CPT

## 2024-07-18 NOTE — FLOWSHEET NOTE
[] [facility]  Outpatient Rehabilitation &  Therapy  [address]  [phone]  [phone] [] [facility]  Outpatient Rehabilitation &  Therapy  [address]  P: [phone]  F: [phone] [x] [facility]  Outpatient Rehabilitation &  Therapy  [address]  P: [phone]  F: [phone] [] [facility]  Outpatient Rehabilitation &  Therapy  [address]  [phone]  [phone] [] [facility]  Outpatient Rehabilitation &  Therapy  [address]   P: [phone]  F: [phone]  [] [facility]  Outpatient Rehabilitation &  Therapy  [address]  P: [phone]  F: [phone] [] [facility]  Outpatient Rehabilitation &  Therapy  [address]  P: [phone]  F: [phone] [] [facility]  Outpatient Rehabilitation &  Therapy  [address]  P: [phone]  F: [phone] [] [facility]  Outpatient Rehabilitation &  Therapy  [address]  P: [phone]  F: [phone]  [] [facility] Outpatient Rehabilitation &  Therapy  [address]  P: [phone]  F: [phone]     Physical Therapy Daily Treatment Note    Date:  2024  Patient Name:  Mary López    :  1945  MRN: [id number]  Physician: Tiera Hernandez                            Insurance: Medicare VISITS BASED ON MEDICAL NECESSITY  Medical Diagnosis: Recurrent Falls, Cervical stenosis of spinal canal, DDD lumbar                       Rehab Codes: R26.2., M54.50 M54.2  Visit# / total visits: ; Progress note for Medicare patient due at visit 10     Cancels/No Shows:     Subjective:    Pain:  [] Yes  [x] No Location: lumbar  Pain Rating: (0-10 scale) 0/10  Pain altered Tx:  [x] No  [] Yes  Action:  Comments: pt with no pain or

## 2024-07-25 ENCOUNTER — HOSPITAL ENCOUNTER (OUTPATIENT)
Age: 79
Setting detail: THERAPIES SERIES
Discharge: HOME | End: 2024-07-25
Payer: MEDICARE

## 2024-07-25 PROCEDURE — 97164 PT RE-EVAL EST PLAN CARE: CPT

## 2024-07-25 NOTE — CONSULTS
discussed with pt/family      Pt/Family Education: [] Verbal  [] Demo  [] Written    Comprehension of Education:  [] Verbalizes understanding.  [] Demonstrates understanding.  [] Needs Review.  [] Demonstrates/verbalizes understanding of HEP/Ed previously given.    Today’s Treatment:  Modalities:   Precautions:  Exercises:  Exercise Reps/ Time Weight/ Level Comments                                                     Other:     Specific Instructions for next treatment:    Evaluation Complexity:  History (Personal factors, comorbidities) [] 0 [] 1-2 [] 3+   Exam (limitations, restrictions) [] 1-2 [] 3 [] 4+   Clinical presentation (progression) [] Stable [] Evolving  [] Unstable   Decision Making [] Low [] Moderate [] High     [] Low Complexity [] Moderate Complexity [] High Complexity     Today's Treatment Charges        Mins       Units   [] PT Evaluation- [] Low; [] Moderate; [] High      [] Canalith repositioning maneuver/technique (CRM/T)     [] Neuroreeed 15min       TOTAL TREATMENT TIME:     Start Time:***             Stop Time:***     More objective information is available upon request.  Thank you for this referral.    Electronically signed by: Rebecca Navarro PT    Physician Signature:________________________________Date:__________________  By signing above or cosigning this note, I have reviewed this plan of care and certify a need for medically necessary rehabilitation services.      *PLEASE SIGN ABOVE AND FAX BACK ALL PAGES*

## 2024-08-02 ENCOUNTER — HOSPITAL ENCOUNTER (OUTPATIENT)
Age: 79
Setting detail: THERAPIES SERIES
Discharge: HOME | End: 2024-08-02

## 2024-08-02 NOTE — FLOWSHEET NOTE
[] [facility]  Outpatient Rehabilitation &  Therapy  [address]  [phone]  [phone] [] [facility]  Outpatient Rehabilitation &  Therapy  [address]  P: [phone]  F: [phone] [] [facility]  Outpatient Rehabilitation &  Therapy  [address]  P: [phone]  F: [phone] [] [facility]  Outpatient Rehabilitation &  Therapy  [address]  [phone]  [phone] [] [facility]  Outpatient Rehabilitation &  Therapy  [address]   P: [phone]  F: [phone]  [x] [facility]  Outpatient Rehabilitation &  Therapy  [address]  P: [phone]  F: [phone] [] [facility]  Outpatient Rehabilitation &  Therapy  [address]  P: [phone]  F: [phone] [] [facility]  Outpatient Rehabilitation &  Therapy  [address]  P: [phone]  F: [phone] [] [facility]  Outpatient Rehabilitation &  Therapy  [address]  P: [phone]  F: [phone]  [] [facility] Outpatient Rehabilitation &  Therapy  [address]  P: [phone]  F: [phone]     Therapy Cancel/No Show note    Date: 2024  Patient: Mary CYR Bessaric  : 1945  MRN: [id number]    Cancels/No Shows to date:     For today's appointment patient:    [x]  Cancelled    [] Rescheduled appointment    [] No-show     Reason given by patient:    []  Patient ill    []  Conflicting appointment    [] No transportation      [] Conflict with work    [] No reason given    [] Weather related    [] COVID-19    [x] Other:   severe vertigo, cannot drive   Comments:        [] Next appointment was confirmed    Electronically signed by: Rebecca Navarro PT

## 2024-08-12 ENCOUNTER — HOSPITAL ENCOUNTER (OUTPATIENT)
Age: 79
Setting detail: THERAPIES SERIES
Discharge: HOME | End: 2024-08-12
Payer: MEDICARE

## 2024-08-12 PROCEDURE — 97112 NEUROMUSCULAR REEDUCATION: CPT

## 2024-08-12 NOTE — FLOWSHEET NOTE
[] [facility]  Outpatient Rehabilitation &  Therapy  [address]  [phone]  [phone] [] [facility]  Outpatient Rehabilitation &  Therapy  [address]  P: [phone]  F: [phone] [] [facility]  Outpatient Rehabilitation &  Therapy  [address]  P: [phone]  F: [phone] [] [facility]  Outpatient Rehabilitation &  Therapy  [address]  [phone]  [phone] [] [facility]  Outpatient Rehabilitation &  Therapy  [address]   P: [phone]  F: [phone]  [x] [facility]  Outpatient Rehabilitation &  Therapy  [address]  P: [phone]  F: [phone] [] [facility]  Outpatient Rehabilitation &  Therapy  [address]  P: [phone]  F: [phone] [] [facility]  Outpatient Rehabilitation &  Therapy  [address]  P: [phone]  F: [phone] [] [facility]  Outpatient Rehabilitation &  Therapy  [address]  P: [phone]  F: [phone]  [] [facility] Outpatient Rehabilitation &  Therapy  [address]  P: [phone]  F: [phone]     Physical Therapy Daily Treatment Note    Date:  2024  Patient Name:  Mary López    :  1945  MRN: [id number]  Physician: Tiera Hernandez                            Insurance: Medicare VISITS BASED ON MEDICAL NECESSITY  Medical Diagnosis: Recurrent Falls, Cervical stenosis of spinal canal, DDD lumbar                       Rehab Codes: R26.2., M54.50 M54.2  Visit# / total visits: ; Progress note for Medicare patient due at visit 10                                    Cancels/No Shows:     Subjective:  Denies  Pain:  [x] Yes  [] No Location: B LE N/A Pain Rating: (0-10 scale) 3/10  Pain altered Tx:  [] No  [] Yes

## 2024-08-15 ENCOUNTER — HOSPITAL ENCOUNTER (OUTPATIENT)
Age: 79
Setting detail: THERAPIES SERIES
Discharge: HOME | End: 2024-08-15
Payer: MEDICARE

## 2024-08-15 PROCEDURE — 97112 NEUROMUSCULAR REEDUCATION: CPT

## 2024-08-15 NOTE — PROGRESS NOTES
[] [facility]  Outpatient Rehabilitation &  Therapy  [address]  [phone]  [phone] [] [facility]  Outpatient Rehabilitation &  Therapy  [address]  P: [phone]  F: [phone] [] [facility]  Outpatient Rehabilitation &  Therapy  [address]  P: [phone]  F: [phone] [] [facility]  Outpatient Rehabilitation &  Therapy  [address]  [phone]  [phone] [] [facility]  Outpatient Rehabilitation &  Therapy  [address]   P: [phone]  F: [phone]  [x] [facility]  Outpatient Rehabilitation &  Therapy  [address]  P: [phone]  F: [phone] [] [facility]  Outpatient Rehabilitation &  Therapy  [address]  P: [phone]  F: [phone] [] [facility]  Outpatient Rehabilitation &  Therapy  [address]  P: [phone]  F: [phone] [] [facility]  Outpatient Rehabilitation &  Therapy  [address]  P: [phone]  F: [phone]  [] [facility] Outpatient Rehabilitation &  Therapy  [address]  P: [phone]  F: [phone]     Physical Therapy Daily Treatment Note/Progress Note    Date:  8/15/2024  Patient Name:  Mary López    :  1945  MRN: [id number]  Physician: Tiera Hernandez                            Insurance: Medicare VISITS BASED ON MEDICAL NECESSITY  Medical Diagnosis: Recurrent Falls, Cervical stenosis of spinal canal, DDD lumbar                       Rehab Codes: R26.2., M54.50 M54.2  Visit# / total visits: 10/18;                                           Progress note for Medicare patient due at visit 20                                    Cancels/No Shows: 3/1    Subjective:  Denies recent dizziness  Pain:  [x] Yes  [] No Location:

## 2024-08-21 ENCOUNTER — HOSPITAL ENCOUNTER (OUTPATIENT)
Age: 79
Setting detail: THERAPIES SERIES
Discharge: HOME | End: 2024-08-21
Payer: MEDICARE

## 2024-08-21 PROCEDURE — 97110 THERAPEUTIC EXERCISES: CPT

## 2024-08-21 NOTE — FLOWSHEET NOTE
[] [facility]  Outpatient Rehabilitation &  Therapy  [address]  [phone]  [phone] [] [facility]  Outpatient Rehabilitation &  Therapy  [address]  P: [phone]  F: [phone] [] [facility]  Outpatient Rehabilitation &  Therapy  [address]  P: [phone]  F: [phone] [] [facility]  Outpatient Rehabilitation &  Therapy  [address]  [phone]  [phone] [] [facility]  Outpatient Rehabilitation &  Therapy  [address]   P: [phone]  F: [phone]  [x] [facility]  Outpatient Rehabilitation &  Therapy  [address]  P: [phone]  F: [phone] [] [facility]  Outpatient Rehabilitation &  Therapy  [address]  P: [phone]  F: [phone] [] [facility]  Outpatient Rehabilitation &  Therapy  [address]  P: [phone]  F: [phone] [] [facility]  Outpatient Rehabilitation &  Therapy  [address]  P: [phone]  F: [phone]  [] [facility] Outpatient Rehabilitation &  Therapy  [address]  P: [phone]  F: [phone]     Physical Therapy Daily Treatment Note/Progress Note    Date:  2024  Patient Name:  Mary López    :  1945  MRN: [id number]  Physician: Tiera Hernandez                            Insurance: Medicare VISITS BASED ON MEDICAL NECESSITY  Medical Diagnosis: Recurrent Falls, Cervical stenosis of spinal canal, DDD lumbar                       Rehab Codes: R26.2., M54.50 M54.2  Visit# / total visits: ;                                           Progress note for Medicare patient due at visit 20                                    Cancels/No Shows: 3/1    Subjective:  Denies recent vertigo. Has imbalance.  Pain:  [x] Yes  []

## 2024-08-23 ENCOUNTER — HOSPITAL ENCOUNTER (OUTPATIENT)
Age: 79
Setting detail: THERAPIES SERIES
Discharge: HOME | End: 2024-08-23
Payer: MEDICARE

## 2024-08-23 PROCEDURE — 97112 NEUROMUSCULAR REEDUCATION: CPT

## 2024-08-23 NOTE — FLOWSHEET NOTE
Progressing  LTG: (to be met in 18 treatments)  JACKY 30% or less impaired                                                                                                                                                                                          Not met  NPDI 20% or less impaired                                                                                                                                                                                           Not Met  Tinetti 24/28 to reduce risk of falls                                                                                                                                                                                Progressing                          Improve DGI score to  21/24 to facilitate safe ambulation                                                                                                                                            Progressing      Patient goals: \"less pain, I would like an improvement with balance (right now it is bad)    Pt. Education:  [x] Yes  [] No  [x] Reviewed Prior HEP/Ed  Method of Education: [x] Verbal  [x] Demo  [] Written  Comprehension of Education:  [x] Verbalizes understanding.  [x] Demonstrates understanding.  [x] Needs review.  [] Demonstrates/verbalizes HEP/Ed previously given.  Access Code: [email or web address]  URL: [email or web address]  Date: 08/21/2024  Prepared by: Rebecca Navarro    Exercises  - Heel Toe Raises with Counter Support  - 1 x daily - 5 x weekly - 2 sets - 10 reps  - Gastroc Stretch on Wall  - 1 x daily - 5 x weekly - 1 sets - 5 reps - 10 hold  - Mini Squat  - 1 x daily - 5 x weekly - 2 sets - 10 reps  - Standing Hip Flexion with Counter Support  - 1 x daily - 5 x weekly - 2 sets - 10 reps  - Standing Hip Abduction with Counter Support  - 1 x daily - 5 x weekly - 2 sets - 10 reps  - Single Leg Stance  - 1 x daily - 5 x weekly -

## 2024-08-28 ENCOUNTER — APPOINTMENT (OUTPATIENT)
Age: 79
End: 2024-08-28
Payer: MEDICARE

## 2024-09-04 ENCOUNTER — HOSPITAL ENCOUNTER (OUTPATIENT)
Age: 79
Setting detail: THERAPIES SERIES
Discharge: HOME | End: 2024-09-04
Payer: MEDICARE

## 2024-09-04 PROCEDURE — 97112 NEUROMUSCULAR REEDUCATION: CPT

## 2024-09-04 NOTE — FLOWSHEET NOTE
[] [facility]  Outpatient Rehabilitation &  Therapy  [address]  [phone]  [phone] [] [facility]  Outpatient Rehabilitation &  Therapy  [address]  P: [phone]  F: [phone] [] [facility]  Outpatient Rehabilitation &  Therapy  [address]  P: [phone]  F: [phone] [] [facility]  Outpatient Rehabilitation &  Therapy  [address]  [phone]  [phone] [] [facility]  Outpatient Rehabilitation &  Therapy  [address]   P: [phone]  F: [phone]  [x] [facility]  Outpatient Rehabilitation &  Therapy  [address]  P: [phone]  F: [phone] [] [facility]  Outpatient Rehabilitation &  Therapy  [address]  P: [phone]  F: [phone] [] [facility]  Outpatient Rehabilitation &  Therapy  [address]  P: [phone]  F: [phone] [] [facility]  Outpatient Rehabilitation &  Therapy  [address]  P: [phone]  F: [phone]  [] [facility] Outpatient Rehabilitation &  Therapy  [address]  P: [phone]  F: [phone]     Physical Therapy Daily Treatment Note/Progress Note    Date:  2024  Patient Name:  Mary López    :  1945  MRN: [id number]  Physician: Tiera Hernandez                            Insurance: Medicare VISITS BASED ON MEDICAL NECESSITY  Medical Diagnosis: Recurrent Falls, Cervical stenosis of spinal canal, DDD lumbar                       Rehab Codes: R26.2., M54.50 M54.2  Visit# / total visits: ;                                           Progress note for Medicare patient due at visit 20                                    Cancels/No Shows: 3/1    Subjective:  Denies recent vertigo. Has imbalance and dizziness.   Pain:

## 2025-09-04 ENCOUNTER — OFFICE VISIT (OUTPATIENT)
Dept: UROLOGY | Age: 80
End: 2025-09-04
Payer: MEDICARE

## 2025-09-04 VITALS
WEIGHT: 138 LBS | OXYGEN SATURATION: 98 % | BODY MASS INDEX: 27.82 KG/M2 | TEMPERATURE: 97.8 F | HEIGHT: 59 IN | DIASTOLIC BLOOD PRESSURE: 80 MMHG | SYSTOLIC BLOOD PRESSURE: 130 MMHG | HEART RATE: 105 BPM

## 2025-09-04 DIAGNOSIS — R33.9 URINE RETENTION: Primary | ICD-10-CM

## 2025-09-04 PROCEDURE — G8427 DOCREV CUR MEDS BY ELIG CLIN: HCPCS | Performed by: UROLOGY

## 2025-09-04 PROCEDURE — 3075F SYST BP GE 130 - 139MM HG: CPT | Performed by: UROLOGY

## 2025-09-04 PROCEDURE — 1123F ACP DISCUSS/DSCN MKR DOCD: CPT | Performed by: UROLOGY

## 2025-09-04 PROCEDURE — 1036F TOBACCO NON-USER: CPT | Performed by: UROLOGY

## 2025-09-04 PROCEDURE — 1159F MED LIST DOCD IN RCRD: CPT | Performed by: UROLOGY

## 2025-09-04 PROCEDURE — G8419 CALC BMI OUT NRM PARAM NOF/U: HCPCS | Performed by: UROLOGY

## 2025-09-04 PROCEDURE — 99214 OFFICE O/P EST MOD 30 MIN: CPT | Performed by: UROLOGY

## 2025-09-04 PROCEDURE — G8399 PT W/DXA RESULTS DOCUMENT: HCPCS | Performed by: UROLOGY

## 2025-09-04 PROCEDURE — 3079F DIAST BP 80-89 MM HG: CPT | Performed by: UROLOGY

## 2025-09-04 PROCEDURE — 1090F PRES/ABSN URINE INCON ASSESS: CPT | Performed by: UROLOGY

## 2025-09-04 RX ORDER — CHLORTHALIDONE 25 MG/1
25 TABLET ORAL DAILY
COMMUNITY
Start: 2025-06-09

## 2025-09-04 RX ORDER — HYDROCORTISONE 25 MG/G
1 CREAM TOPICAL 2 TIMES DAILY
COMMUNITY
Start: 2025-02-28

## 2025-09-04 RX ORDER — POTASSIUM CHLORIDE 750 MG/1
TABLET, EXTENDED RELEASE ORAL
COMMUNITY
Start: 2025-08-12

## 2025-09-04 RX ORDER — AMLODIPINE BESYLATE 2.5 MG/1
TABLET ORAL
COMMUNITY
Start: 2025-07-07

## 2025-09-04 ASSESSMENT — ENCOUNTER SYMPTOMS
RESPIRATORY NEGATIVE: 1
ALLERGIC/IMMUNOLOGIC NEGATIVE: 1
ABDOMINAL PAIN: 0
COUGH: 0
SHORTNESS OF BREATH: 0
GASTROINTESTINAL NEGATIVE: 1
NAUSEA: 0
WHEEZING: 0
VOMITING: 0
EYE PAIN: 0
EYES NEGATIVE: 1
BACK PAIN: 0
EYE REDNESS: 0

## (undated) DEVICE — ST CHARLES CYSTO PACK: Brand: MEDLINE INDUSTRIES, INC.

## (undated) DEVICE — GLOVE ORANGE PI 7 1/2   MSG9075

## (undated) DEVICE — SYSTEM PMP SGL ACT W/ 10CC VAC SYR 1 W VLV FOR ENDOSCP SURG

## (undated) DEVICE — SOLUTION IRRIG 3000ML 0.9% SOD CHL USP UROMATIC PLAS CONT

## (undated) DEVICE — GUIDEWIRE URO L150CM DIA .035IN STIFF NIT HYDRPHL STR TIP

## (undated) DEVICE — DUAL LUMEN URETERAL CATHETER

## (undated) DEVICE — SOLUTION IRRIG 1000ML STRL H2O USP PLAS POUR BTL

## (undated) DEVICE — FIBER ENT HOLM LSR 200 MIC STRL LTX FRE

## (undated) DEVICE — ADAPTER URO SCP UROLOK LL